# Patient Record
Sex: FEMALE | Race: WHITE | NOT HISPANIC OR LATINO
[De-identification: names, ages, dates, MRNs, and addresses within clinical notes are randomized per-mention and may not be internally consistent; named-entity substitution may affect disease eponyms.]

---

## 2017-01-11 ENCOUNTER — APPOINTMENT (OUTPATIENT)
Dept: ORTHOPEDIC SURGERY | Facility: CLINIC | Age: 77
End: 2017-01-11

## 2017-02-08 ENCOUNTER — APPOINTMENT (OUTPATIENT)
Dept: ORTHOPEDIC SURGERY | Facility: CLINIC | Age: 77
End: 2017-02-08

## 2017-02-08 DIAGNOSIS — Z96.641 PRESENCE OF RIGHT ARTIFICIAL HIP JOINT: ICD-10-CM

## 2018-04-25 ENCOUNTER — APPOINTMENT (OUTPATIENT)
Dept: GERIATRICS | Facility: CLINIC | Age: 78
End: 2018-04-25
Payer: MEDICARE

## 2018-04-25 VITALS
WEIGHT: 147 LBS | SYSTOLIC BLOOD PRESSURE: 130 MMHG | OXYGEN SATURATION: 97 % | DIASTOLIC BLOOD PRESSURE: 60 MMHG | RESPIRATION RATE: 18 BRPM | HEIGHT: 62.4 IN | HEART RATE: 72 BPM | BODY MASS INDEX: 26.71 KG/M2

## 2018-04-25 DIAGNOSIS — T40.2X5A DRUG INDUCED CONSTIPATION: ICD-10-CM

## 2018-04-25 DIAGNOSIS — K59.03 DRUG INDUCED CONSTIPATION: ICD-10-CM

## 2018-04-25 DIAGNOSIS — R60.0 LOCALIZED EDEMA: ICD-10-CM

## 2018-04-25 DIAGNOSIS — G89.3 NEOPLASM RELATED PAIN (ACUTE) (CHRONIC): ICD-10-CM

## 2018-04-25 PROCEDURE — 99205 OFFICE O/P NEW HI 60 MIN: CPT

## 2018-04-25 RX ORDER — GABAPENTIN 300 MG/1
300 CAPSULE ORAL
Refills: 0 | Status: ACTIVE | COMMUNITY
Start: 2018-04-25

## 2018-04-25 RX ORDER — OXYCODONE 10 MG/1
10 TABLET ORAL 4 TIMES DAILY
Refills: 0 | Status: ACTIVE | COMMUNITY
Start: 2018-04-25

## 2018-04-25 RX ORDER — ESCITALOPRAM OXALATE 5 MG/1
5 TABLET ORAL
Refills: 0 | Status: ACTIVE | COMMUNITY
Start: 2018-04-25

## 2018-04-25 RX ORDER — DICLOFENAC SODIUM 10 MG/G
1 GEL TOPICAL
Qty: 1 | Refills: 2 | Status: ACTIVE | COMMUNITY
Start: 2018-04-25 | End: 1900-01-01

## 2018-04-25 RX ORDER — FENTANYL 12 UG/H
12 PATCH, EXTENDED RELEASE TRANSDERMAL
Refills: 0 | Status: ACTIVE | COMMUNITY
Start: 2018-04-25

## 2018-04-25 RX ORDER — ACYCLOVIR 400 MG/1
400 TABLET ORAL DAILY
Refills: 0 | Status: ACTIVE | COMMUNITY
Start: 2018-04-25

## 2018-04-25 RX ORDER — DEXAMETHASONE 2 MG/1
2 TABLET ORAL DAILY
Refills: 0 | Status: ACTIVE | COMMUNITY
Start: 2018-04-25

## 2018-04-25 RX ORDER — FENTANYL 25 UG/H
25 PATCH, EXTENDED RELEASE TRANSDERMAL
Refills: 0 | Status: ACTIVE | COMMUNITY
Start: 2018-04-25

## 2018-04-26 PROBLEM — R60.0 BILATERAL LEG EDEMA: Status: ACTIVE | Noted: 2018-04-26

## 2018-04-26 PROBLEM — K59.03 CONSTIPATION DUE TO OPIOID THERAPY: Status: ACTIVE | Noted: 2018-04-26

## 2018-08-06 ENCOUNTER — OUTPATIENT (OUTPATIENT)
Dept: OUTPATIENT SERVICES | Facility: HOSPITAL | Age: 78
LOS: 1 days | Discharge: HOME | End: 2018-08-06
Payer: MEDICARE

## 2018-08-09 ENCOUNTER — APPOINTMENT (OUTPATIENT)
Dept: RADIATION ONCOLOGY | Facility: CLINIC | Age: 78
End: 2018-08-09
Payer: MEDICARE

## 2018-08-09 VITALS
BODY MASS INDEX: 28.27 KG/M2 | SYSTOLIC BLOOD PRESSURE: 150 MMHG | WEIGHT: 142.09 LBS | TEMPERATURE: 98.06 F | RESPIRATION RATE: 15 BRPM | DIASTOLIC BLOOD PRESSURE: 81 MMHG | OXYGEN SATURATION: 92 % | HEART RATE: 66 BPM | HEIGHT: 59.45 IN

## 2018-08-09 DIAGNOSIS — Z92.3 PERSONAL HISTORY OF IRRADIATION: ICD-10-CM

## 2018-08-09 PROCEDURE — 77262 THER RADIOLOGY TX PLNG INTRM: CPT

## 2018-08-09 PROCEDURE — 99214 OFFICE O/P EST MOD 30 MIN: CPT | Mod: 25

## 2018-08-14 PROCEDURE — 77290 THER RAD SIMULAJ FIELD CPLX: CPT | Mod: 26

## 2018-08-15 ENCOUNTER — APPOINTMENT (OUTPATIENT)
Dept: UROGYNECOLOGY | Facility: CLINIC | Age: 78
End: 2018-08-15
Payer: MEDICARE

## 2018-08-15 VITALS
HEART RATE: 66 BPM | SYSTOLIC BLOOD PRESSURE: 144 MMHG | WEIGHT: 142 LBS | OXYGEN SATURATION: 92 % | DIASTOLIC BLOOD PRESSURE: 80 MMHG | BODY MASS INDEX: 28.25 KG/M2 | HEIGHT: 59.45 IN

## 2018-08-15 DIAGNOSIS — R39.89 OTHER SYMPTOMS AND SIGNS INVOLVING THE GENITOURINARY SYSTEM: ICD-10-CM

## 2018-08-15 DIAGNOSIS — R30.0 DYSURIA: ICD-10-CM

## 2018-08-15 DIAGNOSIS — R39.15 URGENCY OF URINATION: ICD-10-CM

## 2018-08-15 DIAGNOSIS — R35.0 FREQUENCY OF MICTURITION: ICD-10-CM

## 2018-08-15 LAB
BILIRUB UR QL STRIP: NORMAL
CLARITY UR: CLEAR
GLUCOSE UR-MCNC: NORMAL
HCG UR QL: 0.2 EU/DL
HGB UR QL STRIP.AUTO: NORMAL
KETONES UR-MCNC: NORMAL
LEUKOCYTE ESTERASE UR QL STRIP: NORMAL
NITRITE UR QL STRIP: NORMAL
PH UR STRIP: 5
PROT UR STRIP-MCNC: NORMAL
SP GR UR STRIP: 1.01

## 2018-08-15 PROCEDURE — 51701 INSERT BLADDER CATHETER: CPT

## 2018-08-15 PROCEDURE — 99204 OFFICE O/P NEW MOD 45 MIN: CPT | Mod: 25

## 2018-08-16 ENCOUNTER — MESSAGE (OUTPATIENT)
Age: 78
End: 2018-08-16

## 2018-08-16 ENCOUNTER — RESULT REVIEW (OUTPATIENT)
Age: 78
End: 2018-08-16

## 2018-08-16 PROCEDURE — 88321 CONSLTJ&REPRT SLD PREP ELSWR: CPT

## 2018-08-16 RX ORDER — METHENAMINE, SODIUM PHOSPHATE, MONOBASIC, MONOHYDRATE, PHENYL SALICYLATE, METHYLENE BLUE, AND HYOSCYAMINE SULFATE 118; 40.8; 36; 10; .12 MG/1; MG/1; MG/1; MG/1; MG/1
118 CAPSULE ORAL 3 TIMES DAILY
Qty: 90 | Refills: 3 | Status: DISCONTINUED | COMMUNITY
Start: 2018-08-15 | End: 2018-08-16

## 2018-08-17 ENCOUNTER — APPOINTMENT (OUTPATIENT)
Dept: RADIATION ONCOLOGY | Facility: CLINIC | Age: 78
End: 2018-08-17

## 2018-08-20 ENCOUNTER — RESULT REVIEW (OUTPATIENT)
Age: 78
End: 2018-08-20

## 2018-08-20 PROCEDURE — 88321 CONSLTJ&REPRT SLD PREP ELSWR: CPT

## 2018-08-20 PROCEDURE — 88341 IMHCHEM/IMCYTCHM EA ADD ANTB: CPT | Mod: 26,59

## 2018-08-20 PROCEDURE — 88342 IMHCHEM/IMCYTCHM 1ST ANTB: CPT | Mod: 26,59

## 2018-08-20 PROCEDURE — 88360 TUMOR IMMUNOHISTOCHEM/MANUAL: CPT | Mod: 26,59

## 2018-08-21 PROCEDURE — 77300 RADIATION THERAPY DOSE PLAN: CPT | Mod: 26

## 2018-08-21 PROCEDURE — 77334 RADIATION TREATMENT AID(S): CPT | Mod: 26

## 2018-08-21 PROCEDURE — 77295 3-D RADIOTHERAPY PLAN: CPT | Mod: 26

## 2018-08-23 ENCOUNTER — APPOINTMENT (OUTPATIENT)
Dept: RADIATION ONCOLOGY | Facility: CLINIC | Age: 78
End: 2018-08-23

## 2018-08-27 PROCEDURE — 77280 THER RAD SIMULAJ FIELD SMPL: CPT | Mod: 26

## 2018-09-04 PROCEDURE — 77427 RADIATION TX MANAGEMENT X5: CPT

## 2018-09-11 PROCEDURE — 77427 RADIATION TX MANAGEMENT X5: CPT

## 2018-09-18 PROCEDURE — 77334 RADIATION TREATMENT AID(S): CPT | Mod: 26

## 2018-09-18 PROCEDURE — 77300 RADIATION THERAPY DOSE PLAN: CPT | Mod: 26

## 2018-09-18 PROCEDURE — 77295 3-D RADIOTHERAPY PLAN: CPT | Mod: 26

## 2018-09-18 PROCEDURE — 77263 THER RADIOLOGY TX PLNG CPLX: CPT

## 2018-09-19 PROCEDURE — 77280 THER RAD SIMULAJ FIELD SMPL: CPT | Mod: 26

## 2018-09-26 PROCEDURE — 77427 RADIATION TX MANAGEMENT X5: CPT

## 2018-09-26 NOTE — VITALS
[Maximal Pain Intensity: 4/10] : 4/10 [Least Pain Intensity: 1/10] : 1/10 [Pain Description/Quality: ___] : Pain description/quality: [unfilled] [Pain Duration: ___] : Pain duration: [unfilled] [Pain Location: ___] : Pain Location: [unfilled] [Pain Interferes with ADLs] : Pain interferes with activities of daily living. [Opioid] : opioid [80: Normal activity with effort; some signs or symptoms of disease.] : 80: Normal activity with effort; some signs or symptoms of disease.  [ECOG Performance Status: 2 - Ambulatory and capable of all self care but unable to carry out any work activities] : Performance Status: 2 - Ambulatory and capable of all self care but unable to carry out any work activities. Up and about more than 50% of waking hours

## 2018-10-03 PROCEDURE — 77427 RADIATION TX MANAGEMENT X5: CPT

## 2018-10-15 ENCOUNTER — APPOINTMENT (OUTPATIENT)
Dept: UROGYNECOLOGY | Facility: CLINIC | Age: 78
End: 2018-10-15

## 2018-11-01 NOTE — HISTORY OF PRESENT ILLNESS
[FreeTextEntry1] : 78 y/o female with h/o multiple myeloma diagnosed in 2010, right hip replacement, Radiation to spine in 4/2011 presents with left hip pain for 6 months. Pt diagnosed in 10/2010 with multiple myeloma, at which time she was treated Revlimid, Velcade. Pt states that she was in remission for 4 years and recurred in 2016, which was found after she presented with a hip fracture. Her disease has progressed through treatments, is now on Velcade. 6 months ago she fractured her L hip and underwent ORIF. \par \par Pt c/o pain managed by Dr Sanderson and Dr Alexandre, Pain is localized to many areas - b/l hips, L>R. Worse with standing, walking. The pain is severe, she rates it as 10/10 at its worst. Currently she rates it at 7-8/10. She uses a rolling walker with ambulation. \par \par Ortho surgeon: Dr Velez and Dr Ernandez. Hem/Onc Dr Meade. \par \par PET scan in 7/2018: left iliac mass, 59 mm suspected malignant mass in the subcutaneous fat adjacent to left iliac bone, left gluteal musculature mass. \par \par Left hip mass biopsy was done in 7/2018.\par \par Patient education given about side effects of Radiation to bone, soft tissue tumor. Side effects include but not limited to skin reaction, bone pain, bone marrow suppression. \par \par 8/8 fractions of RT to left hip completed. Left hip pain stable. No significant skin reaction from RT. On oxycodone 10mg and Fentanyl patch 75mcg for left hip pain.

## 2018-11-15 ENCOUNTER — APPOINTMENT (OUTPATIENT)
Dept: RADIATION ONCOLOGY | Facility: CLINIC | Age: 78
End: 2018-11-15

## 2018-11-18 NOTE — HISTORY OF PRESENT ILLNESS
[FreeTextEntry1] : 78 y/o female with h/o multiple myeloma diagnosed in 2010, right hip replacement, Radiation to spine in 4/2011 presents with left hip pain for 6 months. Pt diagnosed in 10/2010 with multiple myeloma, at which time she was treated Revlimid, Velcade. Pt states that she was in remission for 4 years and recurred in 2016, which was found after she presented with a hip fracture. Her disease has progressed through treatments, is now on Velcade. 6 months ago she fractured her L hip and underwent ORIF. \par \par Pt c/o pain managed by Dr Sanderson and Dr Alexandre, Pain is localized to many areas - b/l hips, L>R. Worse with standing, walking. The pain is severe, she rates it as 10/10 at its worst. Currently she rates it at 7-8/10. She uses a rolling walker with ambulation. \par \par Ortho surgeon: Dr Velez and Dr Ernandez. Hem/Onc Dr Meade. \par \par PET scan in 7/2018: left iliac mass, 59 mm suspected malignant mass in the subcutaneous fat adjacent to left iliac bone, left gluteal musculature mass. \par \par Left hip mass biopsy was done in 7/2018.\par \par Patient education given about side effects of Radiation to bone, soft tissue tumor. Side effects include but not limited to skin reaction, bone pain, bone marrow suppression. \par \par 5/8 fractions of RT to left hip completed. Left hip pain stable. No significant skin reaction from RT. On oxycodone 10mg and Fentanyl patch 75mcg for left hip pain.

## 2018-11-29 ENCOUNTER — APPOINTMENT (OUTPATIENT)
Dept: PULMONOLOGY | Facility: CLINIC | Age: 78
End: 2018-11-29

## 2018-12-14 ENCOUNTER — APPOINTMENT (OUTPATIENT)
Dept: RADIATION ONCOLOGY | Facility: CLINIC | Age: 78
End: 2018-12-14
Payer: MEDICARE

## 2018-12-14 VITALS — HEIGHT: 59 IN | BODY MASS INDEX: 28.22 KG/M2 | RESPIRATION RATE: 16 BRPM | WEIGHT: 140 LBS

## 2018-12-14 PROCEDURE — 99024 POSTOP FOLLOW-UP VISIT: CPT

## 2018-12-14 NOTE — VITALS
[Maximal Pain Intensity: 6/10] : 6/10 [Least Pain Intensity: 2/10] : 2/10 [Pain Description/Quality: ___] : Pain description/quality: [unfilled] [Pain Duration: ___] : Pain duration: [unfilled] [Pain Location: ___] : Pain Location: [unfilled] [Pain Interferes with ADLs] : Pain interferes with activities of daily living. [Opioid] : opioid [80: Normal activity with effort; some signs or symptoms of disease.] : 80: Normal activity with effort; some signs or symptoms of disease.  [ECOG Performance Status: 2 - Ambulatory and capable of all self care but unable to carry out any work activities] : Performance Status: 2 - Ambulatory and capable of all self care but unable to carry out any work activities. Up and about more than 50% of waking hours

## 2018-12-17 NOTE — HISTORY OF PRESENT ILLNESS
[FreeTextEntry1] : 76 y/o female with h/o multiple myeloma diagnosed in 2010, right hip replacement, Radiation to spine in 4/2011 presents with left hip pain for 6 months. Pt diagnosed in 10/2010 with multiple myeloma, at which time she was treated Revlimid, Velcade. Pt states that she was in remission for 4 years and recurred in 2016, which was found after she presented with a hip fracture. Her disease has progressed through treatments, is now on Velcade. In 2/2018 she fractured her L hip and underwent ORIF @ [facility]\par \par She completed radiation treatment to left femur and hip 10/2018. c/o pain over left hip and sensitive skin. MRI of left leg done 1 week ago with Dr Meade [phone]

## 2018-12-17 NOTE — PHYSICAL EXAM
[Normal] : oriented to person, place and time, the affect was normal, the mood was normal and not anxious [de-identified] : left hip tenderness

## 2018-12-17 NOTE — REVIEW OF SYSTEMS
[Negative] : Allergic/Immunologic [FreeTextEntry9] : left leg pain, h/o radiation to left leg 10/2018

## 2019-07-23 ENCOUNTER — APPOINTMENT (OUTPATIENT)
Dept: INTERVENTIONAL RADIOLOGY/VASCULAR | Facility: CLINIC | Age: 79
End: 2019-07-23
Payer: MEDICARE

## 2019-07-23 VITALS
SYSTOLIC BLOOD PRESSURE: 131 MMHG | BODY MASS INDEX: 26.68 KG/M2 | HEIGHT: 62 IN | OXYGEN SATURATION: 97 % | HEART RATE: 65 BPM | WEIGHT: 145 LBS | DIASTOLIC BLOOD PRESSURE: 79 MMHG | RESPIRATION RATE: 16 BRPM

## 2019-07-23 DIAGNOSIS — C90.00 MULTIPLE MYELOMA NOT HAVING ACHIEVED REMISSION: ICD-10-CM

## 2019-07-23 DIAGNOSIS — M25.869 OTHER SPECIFIED JOINT DISORDERS, UNSPECIFIED KNEE: ICD-10-CM

## 2019-07-23 PROCEDURE — 99204 OFFICE O/P NEW MOD 45 MIN: CPT

## 2019-07-23 RX ORDER — METHENAMINE, SODIUM PHOSPHATE, MONOBASIC, MONOHYDRATE, PHENYL SALICYLATE, METHYLENE BLUE, AND HYOSCYAMINE SULFATE 118; 40.8; 36; 10; .12 MG/1; MG/1; MG/1; MG/1; MG/1
118 CAPSULE ORAL 3 TIMES DAILY
Qty: 90 | Refills: 3 | Status: DISCONTINUED | COMMUNITY
Start: 2018-08-17 | End: 2019-07-23

## 2019-07-29 NOTE — PHYSICAL EXAM
[Normal Hearing] : hearing was normal [No Respiratory Distress] : no respiratory distress [Alert] : alert [Not Tender] : non-tender [Normal Rate] : heart rate was normal  [No Tremors] : no tremors [Oriented x3] : oriented to person, place, and time [de-identified] : slow gait walks with cane. left lateral knee mass palpated firm and fixed. tender to palpate. Mild erythema noted as well around the mass.

## 2019-07-29 NOTE — REVIEW OF SYSTEMS
[Fever] : no fever [Chills] : no chills [Loss Of Hearing] : no hearing loss [Palpitations] : no palpitations [Chest Pain] : no chest pain [Shortness Of Breath] : no shortness of breath [Diarrhea] : no diarrhea [Easy Bruising] : no tendency for easy bruising [Easy Bleeding] : no tendency for easy bleeding

## 2019-07-29 NOTE — ASSESSMENT
[FreeTextEntry1] : 78 year old woman with multiple myeloma on treatment with palpable mass in the lateral aspect of the left knee, which is new since the PET/CT from Jan 2019.  There are no cross section imaging since the PET/CT.  Therefore prescription for CT left knee with IV contrast given to patient.  Patient states that there is a hardware in the left femur and that attempt at MRI in the past made her feel extreme heat in the left leg.  Will review the CT left knee and determine if there is anything to biopsy.

## 2019-07-29 NOTE — HISTORY OF PRESENT ILLNESS
[FreeTextEntry1] : 78 years old female with h/o multiple myeloma diagnosed in 2010 right hip replacement, Radiation to spine in 4/2011. She was treated Revlimid, Velcade in 2010 for myeloma. Pt states that she was in remission for 4 years and recurred in 2016, which was found after she presented with a right hip fracture. Her disease has progressed through treatments, is now on Velcade. In 2/2018 she fractured her L hip and underwent ORIF @ [facility]. She completed radiation treatment to left femur and hip 10/2018. She has been complaining of pain over left hip and sensitive skin. \par \par She has been followed by Dr. Meade oncologist. She had PET/Ct done in January 2019 which demonstrated, "soft tissue mass in the subcutaneous fat adjacent to left iliac bone has nearly completely resolved. Residual low density linear structures in this region more likely reflects soft tissue edema or scarring than residual tumor". \par \par She noticed increase in size of the left lateral knee mass and is painful upon palpation. \par \par She is referred by Dr. Meade for left lateral knee mass biopsy. \par Patient states she has a lot of pain in the left knee and leg. Pain is 7/10 and is sharp in nature. She takes Tylenol and oxycodone with some relief. \par Appetite has been good no unintentional weight loss.\par \par Denies any recent SOB, CP, fever, chills, n/v/d. \par

## 2019-08-29 ENCOUNTER — APPOINTMENT (OUTPATIENT)
Dept: THORACIC SURGERY | Facility: CLINIC | Age: 79
End: 2019-08-29

## 2019-12-10 ENCOUNTER — INPATIENT (INPATIENT)
Facility: HOSPITAL | Age: 79
LOS: 5 days | Discharge: HOME | End: 2019-12-16
Attending: HOSPITALIST | Admitting: HOSPITALIST
Payer: MEDICARE

## 2019-12-10 VITALS
TEMPERATURE: 98 F | OXYGEN SATURATION: 99 % | DIASTOLIC BLOOD PRESSURE: 64 MMHG | HEART RATE: 72 BPM | RESPIRATION RATE: 20 BRPM | SYSTOLIC BLOOD PRESSURE: 118 MMHG | HEIGHT: 59 IN | WEIGHT: 147.49 LBS

## 2019-12-10 DIAGNOSIS — I82.401 ACUTE EMBOLISM AND THROMBOSIS OF UNSPECIFIED DEEP VEINS OF RIGHT LOWER EXTREMITY: ICD-10-CM

## 2019-12-10 DIAGNOSIS — Z02.9 ENCOUNTER FOR ADMINISTRATIVE EXAMINATIONS, UNSPECIFIED: ICD-10-CM

## 2019-12-10 DIAGNOSIS — C90.02 MULTIPLE MYELOMA IN RELAPSE: ICD-10-CM

## 2019-12-10 DIAGNOSIS — G89.3 NEOPLASM RELATED PAIN (ACUTE) (CHRONIC): ICD-10-CM

## 2019-12-10 DIAGNOSIS — K59.03 DRUG INDUCED CONSTIPATION: ICD-10-CM

## 2019-12-10 DIAGNOSIS — R11.0 NAUSEA: ICD-10-CM

## 2019-12-10 DIAGNOSIS — C90.00 MULTIPLE MYELOMA NOT HAVING ACHIEVED REMISSION: ICD-10-CM

## 2019-12-10 LAB
ALBUMIN SERPL ELPH-MCNC: 3.7 G/DL — SIGNIFICANT CHANGE UP (ref 3.3–5)
ALP SERPL-CCNC: 68 U/L — SIGNIFICANT CHANGE UP (ref 40–120)
ALT FLD-CCNC: 15 U/L — SIGNIFICANT CHANGE UP (ref 4–33)
ANION GAP SERPL CALC-SCNC: 11 MMO/L — SIGNIFICANT CHANGE UP (ref 7–14)
ANISOCYTOSIS BLD QL: SLIGHT — SIGNIFICANT CHANGE UP
APPEARANCE UR: CLEAR — SIGNIFICANT CHANGE UP
APTT BLD: 32.5 SEC — SIGNIFICANT CHANGE UP (ref 27.5–36.3)
AST SERPL-CCNC: 17 U/L — SIGNIFICANT CHANGE UP (ref 4–32)
BASOPHILS # BLD AUTO: 0.05 K/UL — SIGNIFICANT CHANGE UP (ref 0–0.2)
BASOPHILS NFR BLD AUTO: 2 % — SIGNIFICANT CHANGE UP (ref 0–2)
BASOPHILS NFR SPEC: 1.8 % — SIGNIFICANT CHANGE UP (ref 0–2)
BILIRUB SERPL-MCNC: 0.2 MG/DL — SIGNIFICANT CHANGE UP (ref 0.2–1.2)
BILIRUB UR-MCNC: NEGATIVE — SIGNIFICANT CHANGE UP
BLASTS # FLD: 0 % — SIGNIFICANT CHANGE UP (ref 0–0)
BLOOD UR QL VISUAL: NEGATIVE — SIGNIFICANT CHANGE UP
BUN SERPL-MCNC: 11 MG/DL — SIGNIFICANT CHANGE UP (ref 7–23)
CALCIUM SERPL-MCNC: 9.6 MG/DL — SIGNIFICANT CHANGE UP (ref 8.4–10.5)
CHLORIDE SERPL-SCNC: 98 MMOL/L — SIGNIFICANT CHANGE UP (ref 98–107)
CO2 SERPL-SCNC: 29 MMOL/L — SIGNIFICANT CHANGE UP (ref 22–31)
COLOR SPEC: SIGNIFICANT CHANGE UP
CREAT SERPL-MCNC: 0.63 MG/DL — SIGNIFICANT CHANGE UP (ref 0.5–1.3)
EOSINOPHIL # BLD AUTO: 0.17 K/UL — SIGNIFICANT CHANGE UP (ref 0–0.5)
EOSINOPHIL NFR BLD AUTO: 6.7 % — HIGH (ref 0–6)
EOSINOPHIL NFR FLD: 8 % — HIGH (ref 0–6)
GIANT PLATELETS BLD QL SMEAR: PRESENT — SIGNIFICANT CHANGE UP
GLUCOSE SERPL-MCNC: 104 MG/DL — HIGH (ref 70–99)
GLUCOSE UR-MCNC: NEGATIVE — SIGNIFICANT CHANGE UP
HCT VFR BLD CALC: 32.4 % — LOW (ref 34.5–45)
HGB BLD-MCNC: 10 G/DL — LOW (ref 11.5–15.5)
HYPOCHROMIA BLD QL: SLIGHT — SIGNIFICANT CHANGE UP
IMM GRANULOCYTES NFR BLD AUTO: 0.4 % — SIGNIFICANT CHANGE UP (ref 0–1.5)
INR BLD: 1.16 — SIGNIFICANT CHANGE UP (ref 0.88–1.17)
KETONES UR-MCNC: NEGATIVE — SIGNIFICANT CHANGE UP
LEUKOCYTE ESTERASE UR-ACNC: NEGATIVE — SIGNIFICANT CHANGE UP
LYMPHOCYTES # BLD AUTO: 0.77 K/UL — LOW (ref 1–3.3)
LYMPHOCYTES # BLD AUTO: 30.4 % — SIGNIFICANT CHANGE UP (ref 13–44)
LYMPHOCYTES NFR SPEC AUTO: 15.2 % — SIGNIFICANT CHANGE UP (ref 13–44)
MACROCYTES BLD QL: SLIGHT — SIGNIFICANT CHANGE UP
MAGNESIUM SERPL-MCNC: 2 MG/DL — SIGNIFICANT CHANGE UP (ref 1.6–2.6)
MCHC RBC-ENTMCNC: 28.7 PG — SIGNIFICANT CHANGE UP (ref 27–34)
MCHC RBC-ENTMCNC: 30.9 % — LOW (ref 32–36)
MCV RBC AUTO: 93.1 FL — SIGNIFICANT CHANGE UP (ref 80–100)
METAMYELOCYTES # FLD: 0 % — SIGNIFICANT CHANGE UP (ref 0–1)
MONOCYTES # BLD AUTO: 0.53 K/UL — SIGNIFICANT CHANGE UP (ref 0–0.9)
MONOCYTES NFR BLD AUTO: 20.9 % — HIGH (ref 2–14)
MONOCYTES NFR BLD: 11.6 % — HIGH (ref 2–9)
MYELOCYTES NFR BLD: 0 % — SIGNIFICANT CHANGE UP (ref 0–0)
NEUTROPHIL AB SER-ACNC: 48.2 % — SIGNIFICANT CHANGE UP (ref 43–77)
NEUTROPHILS # BLD AUTO: 1 K/UL — LOW (ref 1.8–7.4)
NEUTROPHILS NFR BLD AUTO: 39.6 % — LOW (ref 43–77)
NEUTS BAND # BLD: 0 % — SIGNIFICANT CHANGE UP (ref 0–6)
NITRITE UR-MCNC: NEGATIVE — SIGNIFICANT CHANGE UP
NRBC # FLD: 0 K/UL — SIGNIFICANT CHANGE UP (ref 0–0)
OTHER - HEMATOLOGY %: 9.8 — SIGNIFICANT CHANGE UP
OVALOCYTES BLD QL SMEAR: SLIGHT — SIGNIFICANT CHANGE UP
PH UR: 7.5 — SIGNIFICANT CHANGE UP (ref 5–8)
PHOSPHATE SERPL-MCNC: 3.7 MG/DL — SIGNIFICANT CHANGE UP (ref 2.5–4.5)
PLATELET # BLD AUTO: 141 K/UL — LOW (ref 150–400)
PLATELET COUNT - ESTIMATE: SIGNIFICANT CHANGE UP
PMV BLD: 8.9 FL — SIGNIFICANT CHANGE UP (ref 7–13)
POLYCHROMASIA BLD QL SMEAR: SLIGHT — SIGNIFICANT CHANGE UP
POTASSIUM SERPL-MCNC: 4.1 MMOL/L — SIGNIFICANT CHANGE UP (ref 3.5–5.3)
POTASSIUM SERPL-SCNC: 4.1 MMOL/L — SIGNIFICANT CHANGE UP (ref 3.5–5.3)
PROMYELOCYTES # FLD: 0 % — SIGNIFICANT CHANGE UP (ref 0–0)
PROT SERPL-MCNC: 6.4 G/DL — SIGNIFICANT CHANGE UP (ref 6–8.3)
PROT UR-MCNC: NEGATIVE — SIGNIFICANT CHANGE UP
PROTHROM AB SERPL-ACNC: 13.3 SEC — HIGH (ref 9.8–13.1)
RBC # BLD: 3.48 M/UL — LOW (ref 3.8–5.2)
RBC # FLD: 15.5 % — HIGH (ref 10.3–14.5)
SODIUM SERPL-SCNC: 138 MMOL/L — SIGNIFICANT CHANGE UP (ref 135–145)
SP GR SPEC: 1.01 — SIGNIFICANT CHANGE UP (ref 1–1.04)
UROBILINOGEN FLD QL: NORMAL — SIGNIFICANT CHANGE UP
VARIANT LYMPHS # BLD: 5.4 % — SIGNIFICANT CHANGE UP
WBC # BLD: 2.53 K/UL — LOW (ref 3.8–10.5)
WBC # FLD AUTO: 2.53 K/UL — LOW (ref 3.8–10.5)

## 2019-12-10 PROCEDURE — 93970 EXTREMITY STUDY: CPT | Mod: 26

## 2019-12-10 PROCEDURE — 71045 X-RAY EXAM CHEST 1 VIEW: CPT | Mod: 26

## 2019-12-10 PROCEDURE — 99223 1ST HOSP IP/OBS HIGH 75: CPT

## 2019-12-10 PROCEDURE — 93010 ELECTROCARDIOGRAM REPORT: CPT

## 2019-12-10 RX ORDER — FENTANYL CITRATE 50 UG/ML
1 INJECTION INTRAVENOUS
Refills: 0 | Status: DISCONTINUED | OUTPATIENT
Start: 2019-12-10 | End: 2019-12-12

## 2019-12-10 RX ORDER — APIXABAN 2.5 MG/1
5 TABLET, FILM COATED ORAL
Refills: 0 | Status: DISCONTINUED | OUTPATIENT
Start: 2019-12-10 | End: 2019-12-16

## 2019-12-10 RX ORDER — OXYCODONE HYDROCHLORIDE 5 MG/1
10 TABLET ORAL EVERY 4 HOURS
Refills: 0 | Status: DISCONTINUED | OUTPATIENT
Start: 2019-12-10 | End: 2019-12-10

## 2019-12-10 RX ORDER — FLUCONAZOLE 150 MG/1
200 TABLET ORAL DAILY
Refills: 0 | Status: DISCONTINUED | OUTPATIENT
Start: 2019-12-11 | End: 2019-12-13

## 2019-12-10 RX ORDER — OXYCODONE HYDROCHLORIDE 5 MG/1
20 TABLET ORAL EVERY 4 HOURS
Refills: 0 | Status: DISCONTINUED | OUTPATIENT
Start: 2019-12-10 | End: 2019-12-12

## 2019-12-10 RX ORDER — ESCITALOPRAM OXALATE 10 MG/1
20 TABLET, FILM COATED ORAL DAILY
Refills: 0 | Status: DISCONTINUED | OUTPATIENT
Start: 2019-12-10 | End: 2019-12-16

## 2019-12-10 RX ORDER — CHLORHEXIDINE GLUCONATE 213 G/1000ML
15 SOLUTION TOPICAL
Refills: 0 | Status: DISCONTINUED | OUTPATIENT
Start: 2019-12-10 | End: 2019-12-16

## 2019-12-10 RX ORDER — SODIUM CHLORIDE 9 MG/ML
1000 INJECTION INTRAMUSCULAR; INTRAVENOUS; SUBCUTANEOUS
Refills: 0 | Status: DISCONTINUED | OUTPATIENT
Start: 2019-12-10 | End: 2019-12-16

## 2019-12-10 RX ORDER — ACYCLOVIR SODIUM 500 MG
400 VIAL (EA) INTRAVENOUS
Refills: 0 | Status: DISCONTINUED | OUTPATIENT
Start: 2019-12-10 | End: 2019-12-13

## 2019-12-10 RX ADMIN — OXYCODONE HYDROCHLORIDE 20 MILLIGRAM(S): 5 TABLET ORAL at 21:58

## 2019-12-10 RX ADMIN — SODIUM CHLORIDE 75 MILLILITER(S): 9 INJECTION INTRAMUSCULAR; INTRAVENOUS; SUBCUTANEOUS at 19:16

## 2019-12-10 RX ADMIN — APIXABAN 5 MILLIGRAM(S): 2.5 TABLET, FILM COATED ORAL at 19:15

## 2019-12-10 RX ADMIN — OXYCODONE HYDROCHLORIDE 20 MILLIGRAM(S): 5 TABLET ORAL at 21:27

## 2019-12-10 RX ADMIN — CHLORHEXIDINE GLUCONATE 15 MILLILITER(S): 213 SOLUTION TOPICAL at 19:16

## 2019-12-10 NOTE — CONSULT NOTE ADULT - SUBJECTIVE AND OBJECTIVE BOX
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 in 2016 patient had progression of disease along with T12/L1 compression fracture and hip fracture s/p ORIF and RT. Patient has since underwent further therapy with RVd, and daratumumab based therapy which was stopped due to intoleraance. In 2017 pt had pathological fracture of L femur and underwent ORIF and RT and started on IRd, CyborD and rechallenged with daratumumab which patient could not tolerate. Patient started on carfizolmib and pomolidomide most recently started on elutuzumab/pomolidomide/dex with progression of disease and now admitted for DCEP (dexamethasone along with CIVI of cisplatin, etoposide and cyclophosphomide). After a prolonged discussion, pt opted to start the first cycle in the hospital and direct admission was arranged. Currently pt denies any further fever, SOB or CP. Patient denies any dysuria, or constipation but notes loose stools.     PAST MEDICAL & SURGICAL HISTORY:  As above  depression  LLE DVT (diagnosed at [facility] in 11/2019)    NKDA  Fm Hx NC; denies tobacco use; deneis EtOh use; lives at home    Home Medications:  acyclovir 400 mg oral tablet: 1 tab(s) orally 2 times a day (10 Dec 2019 12:52)  Eliquis 5 mg oral tablet: 1 tab(s) orally 2 times a day (10 Dec 2019 12:52)  fentaNYL 100 mcg/hr transdermal film, extended release: 1 film(s) transdermal every 72 hours (10 Dec 2019 12:52)  fentaNYL 25 mcg/hr transdermal film, extended release: 1 film(s) transdermal every 72 hours (10 Dec 2019 12:52)  Lexapro 20 mg oral tablet: 1 tab(s) orally once a day (10 Dec 2019 12:52)  oxyCODONE 20 mg oral tablet: 1 tab(s) orally every 6 hours (10 Dec 2019 12:52)      MEDICATIONS  (STANDING):  acyclovir   Oral Tab/Cap 400 milliGRAM(s) Oral two times a day  apixaban 5 milliGRAM(s) Oral two times a day  chlorhexidine 0.12% Liquid 15 milliLiter(s) Oral Mucosa two times a day  escitalopram 20 milliGRAM(s) Oral daily  fentaNYL   Patch  25 MICROgram(s)/Hr 1 Patch Transdermal every 72 hours  fentaNYL   Patch 100 MICROgram(s)/Hr. 1 Patch Transdermal every 48 hours    MEDICATIONS  (PRN):  bisacodyl 5 milliGRAM(s) Oral every 12 hours PRN Constipation  oxyCODONE    IR 20 milliGRAM(s) Oral every 4 hours PRN Severe Pain (7 - 10)    REVIEW OF SYSTEMS  General: Noncontributory; well appearing; Skin/Breast: multiple subcutaneous plasmacytoma; Ophthalmologic: Non-Contributory; ENMT: NC; Respiratory and Thorax: NC; Cardiovascular: NC;  Gastrointestinal: NC; Genitourinary: NC;Musculoskeletal:	myeloma bone disease; Neurological: NC; Psychiatric: depression; Hematology/Lymphatics: MM, LLE DVT; Endocrine: NC; Allergic/Immunologic: NC    Vital Signs Last 24 Hrs  T(C): 36.9 (10 Dec 2019 11:12), Max: 36.9 (10 Dec 2019 11:12)  T(F): 98.5 (10 Dec 2019 11:12), Max: 98.5 (10 Dec 2019 11:12)  HR: 72 (10 Dec 2019 11:12) (72 - 72)  BP: 118/64 (10 Dec 2019 11:12) (118/64 - 118/64)  RR: 20 (10 Dec 2019 11:12) (20 - 20)  SpO2: 99% (10 Dec 2019 11:12) (99% - 99%)    NAC/ NC/AT; PERRLA; EOMI  supple; MMM  A and O x 3  CTA b/l; no W/R/C; [facility] site C/D/I  Nl S1 S2 RR; no M  Ab soft; on HSM; no guarding  no CC; + edema; + subcutaneous mass c/w plasmacytoma  neuro exam non-focal  warm and dry    LABS                        10.0   2.53  )-----------( 141      ( 10 Dec 2019 16:47 )             32.4     CBC Full  -  ( 10 Dec 2019 16:47 )  WBC Count : 2.53 K/uL  RBC Count : 3.48 M/uL  Hemoglobin : 10.0 g/dL  Hematocrit : 32.4 %  Platelet Count - Automated : 141 K/uL  Mean Cell Volume : 93.1 fL  Mean Cell Hemoglobin : 28.7 pg  Mean Cell Hemoglobin Concentration : 30.9 %  Auto Neutrophil # : 1.00 K/uL  Auto Lymphocyte # : 0.77 K/uL  Auto Monocyte # : 0.53 K/uL  Auto Eosinophil # : 0.17 K/uL  Auto Basophil # : 0.05 K/uL  Auto Neutrophil % : 39.6 %  Auto Lymphocyte % : 30.4 %  Auto Monocyte % : 20.9 %  Auto Eosinophil % : 6.7 %  Auto Basophil % : 2.0 % MEDICAL ONCOLOGY CONSULTATION    CC: Multiple Myeloma    HPI: 79 year old woman with IgG kappa plasma cell neoplasm diagnosed in 2010 after she 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 in 2016 patient had progression of disease along with T12/L1 compression fracture and hip fracture s/p ORIF and RT. Patient has since underwent further therapy with RVd, and daratumumab based therapy which was stopped due to intoleraance. In 2017 pt had pathological fracture of L femur and underwent ORIF and RT and started on IRd, CyborD and rechallenged with daratumumab which patient could not tolerate. Patient started on carfizolmib and pomolidomide most recently started on elutuzumab/pomolidomide/dex with progression of disease and now admitted for DCEP (dexamethasone along with CIVI of cisplatin, etoposide and cyclophosphomide). After a prolonged discussion, pt opted to start the first cycle in the hospital and direct admission was arranged. Currently pt denies any further fever, SOB or CP. Patient denies any dysuria, or constipation but notes loose stools.     PAST MEDICAL & SURGICAL HISTORY:  As above  depression  LLE DVT (diagnosed at [facility] in 11/2019)    NKDA  Fm Hx NC; denies tobacco use; deneis EtOh use; lives at home    Home Medications:  acyclovir 400 mg oral tablet: 1 tab(s) orally 2 times a day (10 Dec 2019 12:52)  Eliquis 5 mg oral tablet: 1 tab(s) orally 2 times a day (10 Dec 2019 12:52)  fentaNYL 100 mcg/hr transdermal film, extended release: 1 film(s) transdermal every 72 hours (10 Dec 2019 12:52)  fentaNYL 25 mcg/hr transdermal film, extended release: 1 film(s) transdermal every 72 hours (10 Dec 2019 12:52)  Lexapro 20 mg oral tablet: 1 tab(s) orally once a day (10 Dec 2019 12:52)  oxyCODONE 20 mg oral tablet: 1 tab(s) orally every 6 hours (10 Dec 2019 12:52)      MEDICATIONS  (STANDING):  acyclovir   Oral Tab/Cap 400 milliGRAM(s) Oral two times a day  apixaban 5 milliGRAM(s) Oral two times a day  chlorhexidine 0.12% Liquid 15 milliLiter(s) Oral Mucosa two times a day  escitalopram 20 milliGRAM(s) Oral daily  fentaNYL   Patch  25 MICROgram(s)/Hr 1 Patch Transdermal every 72 hours  fentaNYL   Patch 100 MICROgram(s)/Hr. 1 Patch Transdermal every 48 hours    MEDICATIONS  (PRN):  bisacodyl 5 milliGRAM(s) Oral every 12 hours PRN Constipation  oxyCODONE    IR 20 milliGRAM(s) Oral every 4 hours PRN Severe Pain (7 - 10)    REVIEW OF SYSTEMS  General: Noncontributory; well appearing; Skin/Breast: multiple subcutaneous plasmacytoma; Ophthalmologic: Non-Contributory; ENMT: NC; Respiratory and Thorax: NC; Cardiovascular: NC;  Gastrointestinal: NC; Genitourinary: NC;Musculoskeletal:	myeloma bone disease; Neurological: NC; Psychiatric: depression; Hematology/Lymphatics: MM, LLE DVT; Endocrine: NC; Allergic/Immunologic: NC    Vital Signs Last 24 Hrs  T(C): 36.9 (10 Dec 2019 11:12), Max: 36.9 (10 Dec 2019 11:12)  T(F): 98.5 (10 Dec 2019 11:12), Max: 98.5 (10 Dec 2019 11:12)  HR: 72 (10 Dec 2019 11:12) (72 - 72)  BP: 118/64 (10 Dec 2019 11:12) (118/64 - 118/64)  RR: 20 (10 Dec 2019 11:12) (20 - 20)  SpO2: 99% (10 Dec 2019 11:12) (99% - 99%)    NAC/ NC/AT; PERRLA; EOMI  supple; MMM  A and O x 3  CTA b/l; no W/R/C; [facility] site C/D/I  Nl S1 S2 RR; no M  Ab soft; on HSM; no guarding  no CC; + edema; + subcutaneous mass c/w plasmacytoma  neuro exam non-focal  warm and dry    LABS                        10.0   2.53  )-----------( 141      ( 10 Dec 2019 16:47 )             32.4     CBC Full  -  ( 10 Dec 2019 16:47 )  WBC Count : 2.53 K/uL  RBC Count : 3.48 M/uL  Hemoglobin : 10.0 g/dL  Hematocrit : 32.4 %  Platelet Count - Automated : 141 K/uL  Mean Cell Volume : 93.1 fL  Mean Cell Hemoglobin : 28.7 pg  Mean Cell Hemoglobin Concentration : 30.9 %  Auto Neutrophil # : 1.00 K/uL  Auto Lymphocyte # : 0.77 K/uL  Auto Monocyte # : 0.53 K/uL  Auto Eosinophil # : 0.17 K/uL  Auto Basophil # : 0.05 K/uL  Auto Neutrophil % : 39.6 %  Auto Lymphocyte % : 30.4 %  Auto Monocyte % : 20.9 %  Auto Eosinophil % : 6.7 %  Auto Basophil % : 2.0 %    12-10    138  |  98  |  11  ----------------------------<  104<H>  4.1   |  29  |  0.63    Ca    9.6      10 Dec 2019 16:47  Phos  3.7     12-10  Mg     2.0     12-10    TPro  6.4  /  Alb  3.7  /  TBili  0.2  /  DBili  x   /  AST  17  /  ALT  15  /  AlkPhos  68  12-10 MEDICAL ONCOLOGY CONSULTATION    CC: Multiple Myeloma    HPI: 79 year old woman with IgG kappa plasma cell neoplasm diagnosed in 2010 after she 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 in 2016 patient had progression of disease along with T12/L1 compression fracture and hip fracture s/p ORIF and RT. Patient has since underwent further therapy with RVd, and daratumumab based therapy which was stopped due to intoleraance. In 2017 pt had pathological fracture of L femur and underwent ORIF and RT and started on IRd, CyborD and rechallenged with daratumumab which patient could not tolerate. Patient started on carfizolmib and pomolidomide most recently started on elutuzumab/pomolidomide/dex with progression of disease and now admitted for DCEP (dexamethasone along with CIVI of cisplatin, etoposide and cyclophosphomide). After a prolonged discussion, pt opted to start the first cycle in the hospital and direct admission was arranged. Currently pt denies any further fever, SOB or CP. Patient denies any dysuria, or constipation but notes loose stools.     PAST MEDICAL & SURGICAL HISTORY:  As above  depression  LLE DVT (diagnosed at [facility] in 11/2019)    NKDA  Fm Hx NC; denies tobacco use; deneis EtOh use; lives at home    Home Medications:  acyclovir 400 mg oral tablet: 1 tab(s) orally 2 times a day (10 Dec 2019 12:52)  Eliquis 5 mg oral tablet: 1 tab(s) orally 2 times a day (10 Dec 2019 12:52)  fentaNYL 100 mcg/hr transdermal film, extended release: 1 film(s) transdermal every 72 hours (10 Dec 2019 12:52)  fentaNYL 25 mcg/hr transdermal film, extended release: 1 film(s) transdermal every 72 hours (10 Dec 2019 12:52)  Lexapro 20 mg oral tablet: 1 tab(s) orally once a day (10 Dec 2019 12:52)  oxyCODONE 20 mg oral tablet: 1 tab(s) orally every 6 hours (10 Dec 2019 12:52)      MEDICATIONS  (STANDING):  acyclovir   Oral Tab/Cap 400 milliGRAM(s) Oral two times a day  apixaban 5 milliGRAM(s) Oral two times a day  chlorhexidine 0.12% Liquid 15 milliLiter(s) Oral Mucosa two times a day  escitalopram 20 milliGRAM(s) Oral daily  fentaNYL   Patch  25 MICROgram(s)/Hr 1 Patch Transdermal every 72 hours  fentaNYL   Patch 100 MICROgram(s)/Hr. 1 Patch Transdermal every 48 hours    MEDICATIONS  (PRN):  bisacodyl 5 milliGRAM(s) Oral every 12 hours PRN Constipation  oxyCODONE    IR 20 milliGRAM(s) Oral every 4 hours PRN Severe Pain (7 - 10)    REVIEW OF SYSTEMS  General: Noncontributory; well appearing; Skin/Breast: multiple subcutaneous plasmacytoma; Ophthalmologic: Non-Contributory; ENMT: NC; Respiratory and Thorax: NC; Cardiovascular: NC;  Gastrointestinal: NC; Genitourinary: NC;Musculoskeletal:	myeloma bone disease; Neurological: NC; Psychiatric: depression; Hematology/Lymphatics: MM, LLE DVT; Endocrine: NC; Allergic/Immunologic: NC    Vital Signs Last 24 Hrs  T(C): 36.9 (10 Dec 2019 11:12), Max: 36.9 (10 Dec 2019 11:12)  T(F): 98.5 (10 Dec 2019 11:12), Max: 98.5 (10 Dec 2019 11:12)  HR: 72 (10 Dec 2019 11:12) (72 - 72)  BP: 118/64 (10 Dec 2019 11:12) (118/64 - 118/64)  RR: 20 (10 Dec 2019 11:12) (20 - 20)  SpO2: 99% (10 Dec 2019 11:12) (99% - 99%)    NAC/ NC/AT; PERRLA; EOMI  supple; MMM  A and O x 3  CTA b/l; no W/R/C; [facility] site C/D/I  Nl S1 S2 RR; no M  Ab soft; on HSM; no guarding  no CC; + edema; + subcutaneous mass c/w plasmacytoma  neuro exam non-focal  warm and dry    LABS                        10.0   2.53  )-----------( 141      ( 10 Dec 2019 16:47 )             32.4     CBC Full  -  ( 10 Dec 2019 16:47 )  WBC Count : 2.53 K/uL  RBC Count : 3.48 M/uL  Hemoglobin : 10.0 g/dL  Hematocrit : 32.4 %  Platelet Count - Automated : 141 K/uL  Mean Cell Volume : 93.1 fL  Mean Cell Hemoglobin : 28.7 pg  Mean Cell Hemoglobin Concentration : 30.9 %  Auto Neutrophil # : 1.00 K/uL  Auto Lymphocyte # : 0.77 K/uL  Auto Monocyte # : 0.53 K/uL  Auto Eosinophil # : 0.17 K/uL  Auto Basophil # : 0.05 K/uL  Auto Neutrophil % : 39.6 %  Auto Lymphocyte % : 30.4 %  Auto Monocyte % : 20.9 %  Auto Eosinophil % : 6.7 %  Auto Basophil % : 2.0 %    12-10    138  |  98  |  11  ----------------------------<  104<H>  4.1   |  29  |  0.63    Ca    9.6      10 Dec 2019 16:47  Phos  3.7     12-10  Mg     2.0     12-10    TPro  6.4  /  Alb  3.7  /  TBili  0.2  /  DBili  x   /  AST  17  /  ALT  15  /  AlkPhos  68  12-10      < from: US Duplex Venous Lower Ext Complete, Bilateral (12.10.19 @ 14:44) >  No evidence of deep venous thrombosis in either lower extremity.    Left groin mass measuring 6.1 x 2.7 x 3.3 cm abutting the adjacent common   femoral vein. Findings may reflect groin hematoma. Lymphadenopathy is   also a consideration.    < end of copied text >

## 2019-12-10 NOTE — H&P ADULT - HISTORY OF PRESENT ILLNESS
HPI:    79yFemale PMH    PAST MEDICAL & SURGICAL HISTORY:      Review of Systems:   CONSTITUTIONAL: No fever, weight loss, or fatigue  EYES: No eye pain, visual disturbances, or discharge  ENMT:  No difficulty hearing, tinnitus, vertigo; No sinus or throat pain  NECK: No pain or stiffness  BREASTS: No pain, masses, or nipple discharge  RESPIRATORY: No cough, wheezing, chills or hemoptysis; No shortness of breath  CARDIOVASCULAR: No chest pain, palpitations. +occasional chest tightness/pain, none now  GASTROINTESTINAL: No abdominal or epigastric pain. No nausea, vomiting, or hematemesis; No diarrhea or constipation. No melena or hematochezia.  GENITOURINARY: No dysuria, frequency, hematuria, or incontinence  NEUROLOGICAL: memory loss, loss of strength, numbness, or tremors. Occasional headaches  SKIN: No itching, burning, rashes, or lesions   LYMPH NODES: No enlarged glands  ENDOCRINE: No heat or cold intolerance; No hair loss  MUSCULOSKELETAL: Lower extremity pain, swelling  PSYCHIATRIC: No depression, anxiety, mood swings, or difficulty sleeping  HEME/LYMPH: No easy bruising, or bleeding gums  ALLERGY AND IMMUNOLOGIC: No hives or eczema    Allergies    No Known Allergies    Intolerances        Social History:   Denies smoking, drinking or drug use  LIves at home with     FAMILY HISTORY:  No family history of multiple myeloma    MEDICATIONS  (STANDING):  acyclovir   Oral Tab/Cap 400 milliGRAM(s) Oral two times a day  apixaban 5 milliGRAM(s) Oral two times a day  escitalopram 20 milliGRAM(s) Oral daily  fentaNYL   Patch  25 MICROgram(s)/Hr 1 Patch Transdermal every 72 hours  fentaNYL   Patch 100 MICROgram(s)/Hr. 1 Patch Transdermal every 48 hours    MEDICATIONS  (PRN):  oxyCODONE    IR 20 milliGRAM(s) Oral every 4 hours PRN Severe Pain (7 - 10)      T(C): 36.9 (12-10-19 @ 11:12), Max: 36.9 (12-10-19 @ 11:12)  HR: 72 (12-10-19 @ 11:12) (72 - 72)  BP: 118/64 (12-10-19 @ 11:12) (118/64 - 118/64)  RR: 20 (12-10-19 @ 11:12) (20 - 20)  SpO2: 99% (12-10-19 @ 11:12) (99% - 99%)  Height (cm): 149.9 (12-10-19 @ 11:12)  Weight (kg): 66.9 (12-10-19 @ 11:12)  BMI (kg/m2): 29.8 (12-10-19 @ 11:12)  BSA (m2): 1.62 (12-10-19 @ 11:12)  CAPILLARY BLOOD GLUCOSE        I&O's Summary    10 Dec 2019 07:01  -  10 Dec 2019 12:58  --------------------------------------------------------  IN: 0 mL / OUT: 250 mL / NET: -250 mL        PHYSICAL EXAM:  GENERAL: NAD, well-developed, pleasant  HEAD:  Atraumatic, Normocephalic  EYES: conjunctiva and sclera clear  NECK: Supple, No elevated JVD  CHEST/LUNG: Clear to auscultation bilaterally; No wheeze  HEART: Regular rate and rhythm; No murmurs, rubs, or gallops  ABDOMEN: Soft, Nontender, Nondistended; Bowel sounds present  EXTREMITIES:  2+ Peripheral Pulses, Lower extremity edema, bony prominence over left later ankle with what appears to be a small erythematous nodule on top  PSYCH: AAOx3  NEUROLOGY: CN II-XII grossly intact, moving all extremities      LABS:                Urinalysis Basic - ( 10 Dec 2019 12:21 )    Color: LIGHT YELLOW / Appearance: CLEAR / S.008 / pH: 7.5  Gluc: NEGATIVE / Ketone: NEGATIVE  / Bili: NEGATIVE / Urobili: NORMAL   Blood: NEGATIVE / Protein: NEGATIVE / Nitrite: NEGATIVE   Leuk Esterase: NEGATIVE / RBC: x / WBC x   Sq Epi: x / Non Sq Epi: x / Bacteria: x          RADIOLOGY & ADDITIONAL TESTS:    ECG Personally Reviewed -     Imaging Personally Reviewed:    Consultant(s) Notes Reviewed:      Care Discussed with Consultants/Other Providers: Case discussed with Dr. Meade 79 year old lady with multiple myeloma diagnosed in  with disease progression c/b multiple fractures and DVT despite multiple lines of therapy presents for 4 days of planned inpatient chemo. The patient states that she currently feels weaker and that she feels like her disease is progressing. She has had multiple fractures related to her multiple myeloma and a few weeks was diagnosed with a DVT. She does have frequent episodes of nausea and also constipation (likely a result of her home opioids). She denies any fevers, chills, shortness of breath.    PAST MEDICAL & SURGICAL HISTORY:  The patient denies any other medical problems, no reports of DM, HTN, HLD    Review of Systems:   CONSTITUTIONAL: No fever, weight loss, +fatigue  EYES: No eye pain, visual disturbances, or discharge  ENMT:  No difficulty hearing, tinnitus, vertigo; No sinus or throat pain  NECK: No pain or stiffness  BREASTS: No pain, masses, or nipple discharge  RESPIRATORY: No cough, wheezing, chills or hemoptysis; No shortness of breath  CARDIOVASCULAR: No chest pain, palpitations. +occasional chest tightness/pain, none now  GASTROINTESTINAL: No abdominal or epigastric pain. No nausea, vomiting, or hematemesis; No diarrhea. No melena or hematochezia. +Constipation  GENITOURINARY: No dysuria, frequency, hematuria, or incontinence  NEUROLOGICAL: memory loss, loss of strength, numbness, or tremors. Occasional headaches  SKIN: No itching, burning, rashes, or lesions   LYMPH NODES: No enlarged glands  ENDOCRINE: No heat or cold intolerance; No hair loss  MUSCULOSKELETAL: Lower extremity pain, swelling  PSYCHIATRIC: No depression, anxiety, mood swings, or difficulty sleeping  HEME/LYMPH: No easy bruising, or bleeding gums  ALLERGY AND IMMUNOLOGIC: No hives or eczema    Allergies    No Known Allergies    Intolerances        Social History:   Denies smoking, drinking or drug use  LIves at home with     FAMILY HISTORY:  No family history of multiple myeloma    MEDICATIONS  (STANDING):  acyclovir   Oral Tab/Cap 400 milliGRAM(s) Oral two times a day  apixaban 5 milliGRAM(s) Oral two times a day  escitalopram 20 milliGRAM(s) Oral daily  fentaNYL   Patch  25 MICROgram(s)/Hr 1 Patch Transdermal every 72 hours  fentaNYL   Patch 100 MICROgram(s)/Hr. 1 Patch Transdermal every 48 hours    MEDICATIONS  (PRN):  oxyCODONE    IR 20 milliGRAM(s) Oral every 4 hours PRN Severe Pain (7 - 10)      T(C): 36.9 (12-10-19 @ 11:12), Max: 36.9 (12-10-19 @ 11:12)  HR: 72 (12-10-19 @ 11:12) (72 - 72)  BP: 118/64 (12-10-19 @ 11:12) (118/64 - 118/64)  RR: 20 (12-10-19 @ 11:12) (20 - 20)  SpO2: 99% (12-10-19 @ 11:12) (99% - 99%)  Height (cm): 149.9 (12-10-19 @ 11:12)  Weight (kg): 66.9 (12-10-19 @ 11:12)  BMI (kg/m2): 29.8 (12-10-19 @ 11:12)  BSA (m2): 1.62 (12-10-19 @ 11:12)  CAPILLARY BLOOD GLUCOSE        I&O's Summary    10 Dec 2019 07:01  -  10 Dec 2019 12:58  --------------------------------------------------------  IN: 0 mL / OUT: 250 mL / NET: -250 mL        PHYSICAL EXAM:  GENERAL: NAD, well-developed, pleasant  HEAD:  Atraumatic, Normocephalic  EYES: conjunctiva and sclera clear  NECK: Supple, No elevated JVD  CHEST/LUNG: Clear to auscultation bilaterally; No wheeze  HEART: Regular rate and rhythm; No murmurs, rubs, or gallops  ABDOMEN: Soft, Nontender, Nondistended; Bowel sounds present  EXTREMITIES:  2+ Peripheral Pulses, Lower extremity edema, bony prominence over left later ankle with what appears to be a small erythematous nodule on top  PSYCH: AAOx3  NEUROLOGY: CN II-XII grossly intact, moving all extremities      LABS:                Urinalysis Basic - ( 10 Dec 2019 12:21 )    Color: LIGHT YELLOW / Appearance: CLEAR / S.008 / pH: 7.5  Gluc: NEGATIVE / Ketone: NEGATIVE  / Bili: NEGATIVE / Urobili: NORMAL   Blood: NEGATIVE / Protein: NEGATIVE / Nitrite: NEGATIVE   Leuk Esterase: NEGATIVE / RBC: x / WBC x   Sq Epi: x / Non Sq Epi: x / Bacteria: x          RADIOLOGY & ADDITIONAL TESTS:    ECG Personally Reviewed -     Imaging Personally Reviewed:    Consultant(s) Notes Reviewed:      Care Discussed with Consultants/Other Providers: Case discussed with Dr. Meade

## 2019-12-10 NOTE — H&P ADULT - ASSESSMENT
79 year old lady with multiple myeloma diagnosed in 2010 with disease progression c/b multiple fractures and DVT despite multiple lines of therapy presents for 4 days of planned inpatient chemo.

## 2019-12-10 NOTE — CONSULT NOTE ADULT - ASSESSMENT
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 now to start dexamethasone (20 mg po once daily), cyclophasphomide (250 mg/meter sq x 4 days CIVI), etoposide (25 mg/meter sq x 4 days [facility]I) and cisplatin (7.5 mg meter sq x 4 days CIVI)  -I discussed goals of care in detail with patient; risks and benefits of the chemotherapy regimen discussed in detail and patient understands and informed consent obtained and placed in chart  -continue with acyclovir 400 mg once daily  -add levofloxacin 500 mg once daily and diflucan 200 mg once daily for prophylaxis  -will arrange for neulasta to be done in the oncology office on Monday 12/16/19  -check daily chemistry, Mg, CBC and LFTs  -start normal saline prehydration over night  -obtain myeloma panel including QIGG and kappa/lambda ratio  -continue with pain meds    PROBLEM #2 Neutropenia - likely from MM disease  -neutropenic precaution  -no need for GMCSF at this time but will plan for neulasta on 12/16/19 in the oncology office    PROBLEM #3 Anemia - likely from MM disease  -repeat iron study, retic, B12/folate  -no need for PRBC unless Hg < 7 g/dL or if pt is symptiomatic    PROBLEM #4 DVT of LLE  -continue with eliquis at current dose    MOLST form from the outpatient clinica available; OOB prn; tentative DC to home after completion of DCEP; d/w hospitalist Dr. Schaefer and chemotherapy nurse 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 now to start dexamethasone (20 mg po once daily), cyclophasphomide (250 mg/meter sq x 4 days CIVI), etoposide (25 mg/meter sq x 4 days CIVI) and cisplatin (7.5 mg meter sq x 4 days CIVI)  -I discussed goals of care in detail with patient; risks and benefits of the chemotherapy regimen discussed in detail and patient understands and informed consent obtained and placed in chart  -continue with acyclovir 400 mg once daily  -add levofloxacin 500 mg once daily and diflucan 200 mg once daily for prophylaxis  -will arrange for neulasta to be done in the oncology office on Monday 12/16/19  -check daily chemistry, Mg, CBC and LFTs  -start normal saline prehydration over night  -obtain myeloma panel including QIGG and kappa/lambda ratio  -continue with pain meds    PROBLEM #2 Neutropenia - likely from MM disease  -neutropenic precaution  -no need for GMCSF at this time but will plan for neulasta on 12/16/19 in the oncology office    PROBLEM #3 Anemia - likely from MM disease  -repeat iron study, retic, B12/folate  -no need for PRBC unless Hg < 7 g/dL or if pt is symptiomatic    PROBLEM #4 DVT of LLE - repeat doppler is negative  -continue with eliquis at current dose    MOLST form from the outpatient clinica available; OOB prn; tentative DC to home after completion of DCEP; d/w hospitalist Dr. Schaefer and chemotherapy nurse

## 2019-12-10 NOTE — H&P ADULT - PROBLEM SELECTOR PLAN 1
Diagnosed in 2010  s/p multiple lines of therapy including chemo and radiation  c/b multiple fractures and cancer related pain  Case discussed with Dr. Meade    Plan for 4 days of Cisplatin, Etoposide, Cyclophosphomide, and Dex  Will monitor TLS labs daily

## 2019-12-10 NOTE — H&P ADULT - PROBLEM SELECTOR PLAN 6
Transitions of Care Status:  1.  Name of PCP: Dr. Meade  2.  PCP Contacted on Admission: [x] Y    [ ] N    3.  PCP contacted at Discharge: [ ] Y    [ ] N    [ ] N/A  4.  Post-Discharge Appointment Date and Location:  5.  Summary of Handoff given to PCP:

## 2019-12-11 LAB
ALBUMIN SERPL ELPH-MCNC: 3.1 G/DL — LOW (ref 3.3–5)
ALP SERPL-CCNC: 51 U/L — SIGNIFICANT CHANGE UP (ref 40–120)
ALT FLD-CCNC: 11 U/L — SIGNIFICANT CHANGE UP (ref 4–33)
ANION GAP SERPL CALC-SCNC: 11 MMO/L — SIGNIFICANT CHANGE UP (ref 7–14)
AST SERPL-CCNC: 13 U/L — SIGNIFICANT CHANGE UP (ref 4–32)
BASOPHILS # BLD AUTO: 0.04 K/UL — SIGNIFICANT CHANGE UP (ref 0–0.2)
BASOPHILS NFR BLD AUTO: 2 % — SIGNIFICANT CHANGE UP (ref 0–2)
BILIRUB SERPL-MCNC: 0.3 MG/DL — SIGNIFICANT CHANGE UP (ref 0.2–1.2)
BUN SERPL-MCNC: 9 MG/DL — SIGNIFICANT CHANGE UP (ref 7–23)
CALCIUM SERPL-MCNC: 9 MG/DL — SIGNIFICANT CHANGE UP (ref 8.4–10.5)
CHLORIDE SERPL-SCNC: 105 MMOL/L — SIGNIFICANT CHANGE UP (ref 98–107)
CO2 SERPL-SCNC: 26 MMOL/L — SIGNIFICANT CHANGE UP (ref 22–31)
CREAT SERPL-MCNC: 0.65 MG/DL — SIGNIFICANT CHANGE UP (ref 0.5–1.3)
EOSINOPHIL # BLD AUTO: 0.22 K/UL — SIGNIFICANT CHANGE UP (ref 0–0.5)
EOSINOPHIL NFR BLD AUTO: 10.9 % — HIGH (ref 0–6)
FERRITIN SERPL-MCNC: 265.1 NG/ML — HIGH (ref 15–150)
FOLATE SERPL-MCNC: > 20 NG/ML — HIGH (ref 4.7–20)
GLUCOSE SERPL-MCNC: 86 MG/DL — SIGNIFICANT CHANGE UP (ref 70–99)
HCT VFR BLD CALC: 33.5 % — LOW (ref 34.5–45)
HGB BLD-MCNC: 10.3 G/DL — LOW (ref 11.5–15.5)
IMM GRANULOCYTES NFR BLD AUTO: 0 % — SIGNIFICANT CHANGE UP (ref 0–1.5)
IRON SATN MFR SERPL: 178 UG/DL — SIGNIFICANT CHANGE UP (ref 140–530)
IRON SATN MFR SERPL: 47 UG/DL — SIGNIFICANT CHANGE UP (ref 30–160)
KAPPA FREE LIGHT CHAINS, SERUM: 32.36 MG/DL — HIGH (ref 0.33–1.94)
LAMBDA FREE LIGHT CHAINS, SERUM: 0.27 MG/DL — LOW (ref 0.57–2.63)
LDH SERPL L TO P-CCNC: 164 U/L — SIGNIFICANT CHANGE UP (ref 135–225)
LYMPHOCYTES # BLD AUTO: 0.68 K/UL — LOW (ref 1–3.3)
LYMPHOCYTES # BLD AUTO: 33.7 % — SIGNIFICANT CHANGE UP (ref 13–44)
MAGNESIUM SERPL-MCNC: 2 MG/DL — SIGNIFICANT CHANGE UP (ref 1.6–2.6)
MANUAL SMEAR VERIFICATION: SIGNIFICANT CHANGE UP
MCHC RBC-ENTMCNC: 28.3 PG — SIGNIFICANT CHANGE UP (ref 27–34)
MCHC RBC-ENTMCNC: 30.7 % — LOW (ref 32–36)
MCV RBC AUTO: 92 FL — SIGNIFICANT CHANGE UP (ref 80–100)
MONOCYTES # BLD AUTO: 0.44 K/UL — SIGNIFICANT CHANGE UP (ref 0–0.9)
MONOCYTES NFR BLD AUTO: 21.8 % — HIGH (ref 2–14)
NEUTROPHILS # BLD AUTO: 0.64 K/UL — LOW (ref 1.8–7.4)
NEUTROPHILS NFR BLD AUTO: 31.6 % — LOW (ref 43–77)
NRBC # FLD: 0 K/UL — SIGNIFICANT CHANGE UP (ref 0–0)
PHOSPHATE SERPL-MCNC: 3.5 MG/DL — SIGNIFICANT CHANGE UP (ref 2.5–4.5)
PLATELET # BLD AUTO: 132 K/UL — LOW (ref 150–400)
PMV BLD: 9 FL — SIGNIFICANT CHANGE UP (ref 7–13)
POTASSIUM SERPL-MCNC: 4.7 MMOL/L — SIGNIFICANT CHANGE UP (ref 3.5–5.3)
POTASSIUM SERPL-SCNC: 4.7 MMOL/L — SIGNIFICANT CHANGE UP (ref 3.5–5.3)
PROT SERPL-MCNC: 5.8 G/DL — LOW (ref 6–8.3)
RBC # BLD: 3.64 M/UL — LOW (ref 3.8–5.2)
RBC # FLD: 15.9 % — HIGH (ref 10.3–14.5)
RETICS #: 43 K/UL — SIGNIFICANT CHANGE UP (ref 25–125)
RETICS/RBC NFR: 1.2 % — SIGNIFICANT CHANGE UP (ref 0.5–2.5)
SODIUM SERPL-SCNC: 142 MMOL/L — SIGNIFICANT CHANGE UP (ref 135–145)
UIBC SERPL-MCNC: 130.7 UG/DL — SIGNIFICANT CHANGE UP (ref 110–370)
URATE SERPL-MCNC: 4 MG/DL — SIGNIFICANT CHANGE UP (ref 2.5–7)
VIT B12 SERPL-MCNC: 786 PG/ML — SIGNIFICANT CHANGE UP (ref 200–900)
WBC # BLD: 2.02 K/UL — LOW (ref 3.8–10.5)
WBC # FLD AUTO: 2.02 K/UL — LOW (ref 3.8–10.5)

## 2019-12-11 PROCEDURE — 99233 SBSQ HOSP IP/OBS HIGH 50: CPT

## 2019-12-11 RX ORDER — DEXAMETHASONE 0.5 MG/5ML
20 ELIXIR ORAL DAILY
Refills: 0 | Status: DISCONTINUED | OUTPATIENT
Start: 2019-12-11 | End: 2019-12-12

## 2019-12-11 RX ORDER — CYCLOPHOSPHAMIDE 100 MG
400 VIAL (EA) INTRAVENOUS DAILY
Refills: 0 | Status: DISCONTINUED | OUTPATIENT
Start: 2019-12-11 | End: 2019-12-14

## 2019-12-11 RX ORDER — ACETAMINOPHEN 500 MG
650 TABLET ORAL EVERY 6 HOURS
Refills: 0 | Status: DISCONTINUED | OUTPATIENT
Start: 2019-12-11 | End: 2019-12-16

## 2019-12-11 RX ORDER — PROCHLORPERAZINE MALEATE 5 MG
10 TABLET ORAL EVERY 6 HOURS
Refills: 0 | Status: DISCONTINUED | OUTPATIENT
Start: 2019-12-11 | End: 2019-12-16

## 2019-12-11 RX ORDER — ONDANSETRON 8 MG/1
8 TABLET, FILM COATED ORAL DAILY
Refills: 0 | Status: DISCONTINUED | OUTPATIENT
Start: 2019-12-11 | End: 2019-12-12

## 2019-12-11 RX ORDER — DIPHENHYDRAMINE HCL 50 MG
25 CAPSULE ORAL EVERY 6 HOURS
Refills: 0 | Status: DISCONTINUED | OUTPATIENT
Start: 2019-12-11 | End: 2019-12-16

## 2019-12-11 RX ORDER — CHLORHEXIDINE GLUCONATE 213 G/1000ML
1 SOLUTION TOPICAL
Refills: 0 | Status: DISCONTINUED | OUTPATIENT
Start: 2019-12-11 | End: 2019-12-16

## 2019-12-11 RX ADMIN — OXYCODONE HYDROCHLORIDE 20 MILLIGRAM(S): 5 TABLET ORAL at 10:27

## 2019-12-11 RX ADMIN — OXYCODONE HYDROCHLORIDE 20 MILLIGRAM(S): 5 TABLET ORAL at 10:57

## 2019-12-11 RX ADMIN — FENTANYL CITRATE 1 PATCH: 50 INJECTION INTRAVENOUS at 18:23

## 2019-12-11 RX ADMIN — FLUCONAZOLE 200 MILLIGRAM(S): 150 TABLET ORAL at 12:43

## 2019-12-11 RX ADMIN — APIXABAN 5 MILLIGRAM(S): 2.5 TABLET, FILM COATED ORAL at 09:21

## 2019-12-11 RX ADMIN — CHLORHEXIDINE GLUCONATE 15 MILLILITER(S): 213 SOLUTION TOPICAL at 18:24

## 2019-12-11 RX ADMIN — Medication 400 MILLIGRAM(S): at 09:21

## 2019-12-11 RX ADMIN — SODIUM CHLORIDE 75 MILLILITER(S): 9 INJECTION INTRAMUSCULAR; INTRAVENOUS; SUBCUTANEOUS at 08:18

## 2019-12-11 RX ADMIN — FENTANYL CITRATE 1 PATCH: 50 INJECTION INTRAVENOUS at 07:21

## 2019-12-11 RX ADMIN — CHLORHEXIDINE GLUCONATE 15 MILLILITER(S): 213 SOLUTION TOPICAL at 07:19

## 2019-12-11 RX ADMIN — Medication 10 MILLIGRAM(S): at 11:05

## 2019-12-11 RX ADMIN — OXYCODONE HYDROCHLORIDE 20 MILLIGRAM(S): 5 TABLET ORAL at 19:15

## 2019-12-11 RX ADMIN — Medication 10 MILLIGRAM(S): at 22:26

## 2019-12-11 RX ADMIN — ONDANSETRON 8 MILLIGRAM(S): 8 TABLET, FILM COATED ORAL at 11:04

## 2019-12-11 RX ADMIN — ESCITALOPRAM OXALATE 20 MILLIGRAM(S): 10 TABLET, FILM COATED ORAL at 12:42

## 2019-12-11 RX ADMIN — FENTANYL CITRATE 1 PATCH: 50 INJECTION INTRAVENOUS at 07:19

## 2019-12-11 RX ADMIN — Medication 41.67 MILLIGRAM(S): at 11:57

## 2019-12-11 RX ADMIN — Medication 20 MILLIGRAM(S): at 11:05

## 2019-12-11 NOTE — PROGRESS NOTE ADULT - ASSESSMENT
79 year old lady with multiple myeloma diagnosed in 2010 with disease progression c/b multiple fractures and DVT despite multiple lines of therapy presents for 4 days of planned inpatient chemo.  Chest 1 View- PORTABLE-Routine (12.10.19 @ 16:54) >  IMPRESSION:  Bibasilar linear atelectasis otherwise clear lungs. No pleural effusion   or pneumothorax.  The cardiomediastinal silhouette is normal in size and contour. Tip of   the right Mediport catheter within SVC.    < from: US Duplex Venous Lower Ext Complete, Bilateral (12.10.19 @ 14:44) >  IMPRESSION:   No evidence of deep venous thrombosis in either lower extremity.  Left groin mass measuring 6.1 x 2.7 x 3.3 cm abutting the adjacent common   femoral vein. Findings may reflect groin hematoma. Lymphadenopathy is   also a consideration.      no dvt  MM w/o remission  L groin mass 6cm 79 year old lady with multiple myeloma diagnosed in 2010 with disease progression c/b multiple fractures and DVT despite multiple lines of therapy presents for 4 days of planned inpatient chemo.  pancytopenia with neutropenia anc 640   L groin mass- ? Hematoma- consider pelvic ct to asses    Chest 1 View- PORTABLE-Routine (12.10.19 @ 16:54) >  IMPRESSION:  Bibasilar linear atelectasis otherwise clear lungs. No pleural effusion   or pneumothorax.  The cardiomediastinal silhouette is normal in size and contour. Tip of   the right Mediport catheter within SVC.    < from: US Duplex Venous Lower Ext Complete, Bilateral (12.10.19 @ 14:44) >  IMPRESSION:   No evidence of deep venous thrombosis in either lower extremity.  Left groin mass measuring 6.1 x 2.7 x 3.3 cm abutting the adjacent common   femoral vein. Findings may reflect groin hematoma. Lymphadenopathy is   also a consideration.      no dvt  MM w/o remission  L groin mass 6cm

## 2019-12-11 NOTE — PROGRESS NOTE ADULT - SUBJECTIVE AND OBJECTIVE BOX
MED ONC ATT Consult PN    S: Events noted    Vital Signs Last 24 Hrs  T(C): 36.8 (10 Dec 2019 21:02), Max: 36.9 (10 Dec 2019 11:12)  T(F): 98.2 (10 Dec 2019 21:02), Max: 98.5 (10 Dec 2019 11:12)  HR: 68 (10 Dec 2019 21:02) (68 - 72)  BP: 124/68 (10 Dec 2019 21:02) (118/64 - 124/68)  RR: 19 (10 Dec 2019 21:02) (19 - 20)  SpO2: 96% (10 Dec 2019 21:02) (96% - 99%) MED ONC ATT Consult PN    S: Events noted    Vital Signs Last 24 Hrs  T(C): 36.8 (10 Dec 2019 21:02), Max: 36.9 (10 Dec 2019 11:12)  T(F): 98.2 (10 Dec 2019 21:02), Max: 98.5 (10 Dec 2019 11:12)  HR: 68 (10 Dec 2019 21:02) (68 - 72)  BP: 124/68 (10 Dec 2019 21:02) (118/64 - 124/68)  RR: 19 (10 Dec 2019 21:02) (19 - 20)  SpO2: 96% (10 Dec 2019 21:02) (96% - 99%)    NAC/ NC/AT; PERRLA; EOMI  supple; MMM  A and O x 3  CTA b/l; no W/R/C; mediport site C/D/I  Nl S1 S2 RR; no M  Ab soft; on HSM; no guarding  no CC; + edema; + subcutaneous mass c/w plasmacytoma  neuro exam non-focal  warm and dry    LABS                          10.3   2.02  )-----------( 132      ( 11 Dec 2019 06:15 )             33.5       CBC Full  -  ( 11 Dec 2019 06:15 )  WBC Count : 2.02 K/uL  RBC Count : 3.64 M/uL  Hemoglobin : 10.3 g/dL  Hematocrit : 33.5 %  Platelet Count - Automated : 132 K/uL  Mean Cell Volume : 92.0 fL  Mean Cell Hemoglobin : 28.3 pg  Mean Cell Hemoglobin Concentration : 30.7 %  Auto Neutrophil # : 0.64 K/uL  Auto Lymphocyte # : 0.68 K/uL  Auto Monocyte # : 0.44 K/uL  Auto Eosinophil # : 0.22 K/uL  Auto Basophil # : 0.04 K/uL  Auto Neutrophil % : 31.6 %  Auto Lymphocyte % : 33.7 %  Auto Monocyte % : 21.8 %  Auto Eosinophil % : 10.9 %  Auto Basophil % : 2.0 %                 10.0   2.53  )-----------( 141      ( 10 Dec 2019 16:47 )             32.4     CBC Full  -  ( 10 Dec 2019 16:47 )  WBC Count : 2.53 K/uL  RBC Count : 3.48 M/uL  Hemoglobin : 10.0 g/dL  Hematocrit : 32.4 %  Platelet Count - Automated : 141 K/uL  Mean Cell Volume : 93.1 fL  Mean Cell Hemoglobin : 28.7 pg  Mean Cell Hemoglobin Concentration : 30.9 %  Auto Neutrophil # : 1.00 K/uL  Auto Lymphocyte # : 0.77 K/uL  Auto Monocyte # : 0.53 K/uL  Auto Eosinophil # : 0.17 K/uL  Auto Basophil # : 0.05 K/uL  Auto Neutrophil % : 39.6 %  Auto Lymphocyte % : 30.4 %  Auto Monocyte % : 20.9 %  Auto Eosinophil % : 6.7 %  Auto Basophil % : 2.0 %    12-10    138  |  98  |  11  ----------------------------<  104<H>  4.1   |  29  |  0.63    Ca    9.6      10 Dec 2019 16:47  Phos  3.7     12-10  Mg     2.0     12-10    TPro  6.4  /  Alb  3.7  /  TBili  0.2  /  DBili  x   /  AST  17  /  ALT  15  /  AlkPhos  68  12-10      < from: US Duplex Venous Lower Ext Complete, Bilateral (12.10.19 @ 14:44) >  No evidence of deep venous thrombosis in either lower extremity.    Left groin mass measuring 6.1 x 2.7 x 3.3 cm abutting the adjacent common   femoral vein. Findings may reflect groin hematoma. Lymphadenopathy is   also a consideration.    < end of copied text >NAC/ NC/AT; PERRLA; EOMI  supple; MMM  A and O x 3  [facility] b[facility]l; [facility]  Nl S1 S2 RR; no M  Ab soft; on HSM; no guarding  no CC; + edema; + subcutaneous mass c/w plasmacytoma  neuro exam non-focal  warm and dry    LABS                        10.0   2.53  )-----------( 141      ( 10 Dec 2019 16:47 )             32.4     CBC Full  -  ( 10 Dec 2019 16:47 )  WBC Count : 2.53 K/uL  RBC Count : 3.48 M/uL  Hemoglobin : 10.0 g/dL  Hematocrit : 32.4 %  Platelet Count - Automated : 141 K/uL  Mean Cell Volume : 93.1 fL  Mean Cell Hemoglobin : 28.7 pg  Mean Cell Hemoglobin Concentration : 30.9 %  Auto Neutrophil # : 1.00 K/uL  Auto Lymphocyte # : 0.77 K/uL  Auto Monocyte # : 0.53 K/uL  Auto Eosinophil # : 0.17 K/uL  Auto Basophil # : 0.05 K/uL  Auto Neutrophil % : 39.6 %  Auto Lymphocyte % : 30.4 %  Auto Monocyte % : 20.9 %  Auto Eosinophil % : 6.7 %  Auto Basophil % : 2.0 %    12-11    pending    12-10    138  |  98  |  11  ----------------------------<  104<H>  4.1   |  29  |  0.63    Ca    9.6      10 Dec 2019 16:47  Phos  3.7     12-10  Mg     2.0     12-10    TPro  6.4  /  Alb  3.7  /  TBili  0.2  /  DBili  x   /  AST  17  /  ALT  15  /  AlkPhos  68  12-10      < from: US Duplex Venous Lower Ext Complete, Bilateral (12.10.19 @ 14:44) >  No evidence of deep venous thrombosis in either lower extremity.    Left groin mass measuring 6.1 x 2.7 x 3.3 cm abutting the adjacent common   femoral vein. Findings may reflect groin hematoma. Lymphadenopathy is   also a consideration.    < end of copied text > MED ONC ATT Consult PN    S: Events noted; pt has no complaint. Pt denies SOB or CP. Pain adequately controlled.    Vital Signs Last 24 Hrs  T(C): 36.8 (10 Dec 2019 21:02), Max: 36.9 (10 Dec 2019 11:12)  T(F): 98.2 (10 Dec 2019 21:02), Max: 98.5 (10 Dec 2019 11:12)  HR: 68 (10 Dec 2019 21:02) (68 - 72)  BP: 124/68 (10 Dec 2019 21:02) (118/64 - 124/68)  RR: 19 (10 Dec 2019 21:02) (19 - 20)  SpO2: 96% (10 Dec 2019 21:02) (96% - 99%)    NAC/ NC/AT; PERRLA; EOMI  supple; MMM  A and O x 3  CT[facility] b[facility]l; [facility]  Nl S1 S2 RR; no M  Ab soft; on HSM; no guarding  no CC; + edema; + subcutaneous masses c/w plasmacytoma  neuro exam non-focal  warm and dry    LABS                          10.3   2.02  )-----------( 132      ( 11 Dec 2019 06:15 )             33.5       CBC Full  -  ( 11 Dec 2019 06:15 )  WBC Count : 2.02 K/uL  RBC Count : 3.64 M/uL  Hemoglobin : 10.3 g/dL  Hematocrit : 33.5 %  Platelet Count - Automated : 132 K/uL  Mean Cell Volume : 92.0 fL  Mean Cell Hemoglobin : 28.3 pg  Mean Cell Hemoglobin Concentration : 30.7 %  Auto Neutrophil # : 0.64 K/uL  Auto Lymphocyte # : 0.68 K/uL  Auto Monocyte # : 0.44 K/uL  Auto Eosinophil # : 0.22 K/uL  Auto Basophil # : 0.04 K/uL  Auto Neutrophil % : 31.6 %  Auto Lymphocyte % : 33.7 %  Auto Monocyte % : 21.8 %  Auto Eosinophil % : 10.9 %  Auto Basophil % : 2.0 %                 10.0   2.53  )-----------( 141      ( 10 Dec 2019 16:47 )             32.4     CBC Full  -  ( 10 Dec 2019 16:47 )  WBC Count : 2.53 K/uL  RBC Count : 3.48 M/uL  Hemoglobin : 10.0 g/dL  Hematocrit : 32.4 %  Platelet Count - Automated : 141 K/uL  Mean Cell Volume : 93.1 fL  Mean Cell Hemoglobin : 28.7 pg  Mean Cell Hemoglobin Concentration : 30.9 %  Auto Neutrophil # : 1.00 K/uL  Auto Lymphocyte # : 0.77 K/uL  Auto Monocyte # : 0.53 K/uL  Auto Eosinophil # : 0.17 K/uL  Auto Basophil # : 0.05 K/uL  Auto Neutrophil % : 39.6 %  Auto Lymphocyte % : 30.4 %  Auto Monocyte % : 20.9 %  Auto Eosinophil % : 6.7 %  Auto Basophil % : 2.0 %    12-10    138  |  98  |  11  ----------------------------<  104<H>  4.1   |  29  |  0.63    Ca    9.6      10 Dec 2019 16:47  Phos  3.7     12-10  Mg     2.0     12-10    TPro  6.4  /  Alb  3.7  /  TBili  0.2  /  DBili  x   /  AST  17  /  ALT  15  /  AlkPhos  68  12-10      < from: US Duplex Venous Lower Ext Complete, Bilateral (12.10.19 @ 14:44) >  No evidence of deep venous thrombosis in either lower extremity.    Left groin mass measuring 6.1 x 2.7 x 3.3 cm abutting the adjacent common   femoral vein. Findings may reflect groin hematoma. Lymphadenopathy is   also a consideration.    < end of copied text >NAC/ NC/AT; PERRLA; EOMI  supple; MMM  A and O x 3  CTA b/l; [facility]  Nl S1 S2 RR; no M  Ab soft; on HSM; no guarding  no CC; + edema; + subcutaneous mass c/w plasmacytoma  neuro exam non-focal  warm and dry    LABS                        10.0   2.53  )-----------( 141      ( 10 Dec 2019 16:47 )             32.4     CBC Full  -  ( 10 Dec 2019 16:47 )  WBC Count : 2.53 K/uL  RBC Count : 3.48 M/uL  Hemoglobin : 10.0 g/dL  Hematocrit : 32.4 %  Platelet Count - Automated : 141 K/uL  Mean Cell Volume : 93.1 fL  Mean Cell Hemoglobin : 28.7 pg  Mean Cell Hemoglobin Concentration : 30.9 %  Auto Neutrophil # : 1.00 K/uL  Auto Lymphocyte # : 0.77 K/uL  Auto Monocyte # : 0.53 K/uL  Auto Eosinophil # : 0.17 K/uL  Auto Basophil # : 0.05 K/uL  Auto Neutrophil % : 39.6 %  Auto Lymphocyte % : 30.4 %  Auto Monocyte % : 20.9 %  Auto Eosinophil % : 6.7 %  Auto Basophil % : 2.0 %    12-11    142  |  105  |  9   ----------------------------<  86  4.7   |  26  |  0.65    Ca    9.0      11 Dec 2019 06:15  Phos  3.5     12-11  Mg     2.0     12-11    TPro  5.8<L>  /  Alb  3.1<L>  /  TBili  0.3  /  DBili  x   /  AST  13  /  ALT  11  /  AlkPhos  51  12-11      12-10    138  |  98  |  11  ----------------------------<  104<H>  4.1   |  29  |  0.63    Ca    9.6      10 Dec 2019 16:47  Phos  3.7     12-10  Mg     2.0     12-10    TPro  6.4  /  Alb  3.7  /  TBili  0.2  /  DBili  x   /  AST  17  /  ALT  15  /  AlkPhos  68  12-10      < from: US Duplex Venous Lower Ext Complete, Bilateral (12.10.19 @ 14:44) >  No evidence of deep venous thrombosis in either lower extremity.    Left groin mass measuring 6.1 x 2.7 x 3.3 cm abutting the adjacent common   femoral vein. Findings may reflect groin hematoma. Lymphadenopathy is   also a consideration.    < end of copied text > MED ONC ATT Consult PN    S: Events noted; pt has no complaint. Pt denies SOB or CP. Pain adequately controlled.    Vital Signs Last 24 Hrs  T(C): 36.8 (10 Dec 2019 21:02), Max: 36.9 (10 Dec 2019 11:12)  T(F): 98.2 (10 Dec 2019 21:02), Max: 98.5 (10 Dec 2019 11:12)  HR: 68 (10 Dec 2019 21:02) (68 - 72)  BP: 124/68 (10 Dec 2019 21:02) (118/64 - 124/68)  RR: 19 (10 Dec 2019 21:02) (19 - 20)  SpO2: 96% (10 Dec 2019 21:02) (96% - 99%)    NAC/ NC/AT; PERRLA; EOMI  supple; MMM  A and O x 3  CT[facility] b[facility]l; [facility]  Nl S1 S2 RR; no M  Ab soft; on HSM; no guarding  no CC; + edema; + subcutaneous masses c/w plasmacytoma  neuro exam non-focal  warm and dry    LABS                          10.3   2.02  )-----------( 132      ( 11 Dec 2019 06:15 )             33.5       CBC Full  -  ( 11 Dec 2019 06:15 )  WBC Count : 2.02 K/uL  RBC Count : 3.64 M/uL  Hemoglobin : 10.3 g/dL  Hematocrit : 33.5 %  Platelet Count - Automated : 132 K/uL  Mean Cell Volume : 92.0 fL  Mean Cell Hemoglobin : 28.3 pg  Mean Cell Hemoglobin Concentration : 30.7 %  Auto Neutrophil # : 0.64 K/uL  Auto Lymphocyte # : 0.68 K/uL  Auto Monocyte # : 0.44 K/uL  Auto Eosinophil # : 0.22 K/uL  Auto Basophil # : 0.04 K/uL  Auto Neutrophil % : 31.6 %  Auto Lymphocyte % : 33.7 %  Auto Monocyte % : 21.8 %  Auto Eosinophil % : 10.9 %  Auto Basophil % : 2.0 %                 10.0   2.53  )-----------( 141      ( 10 Dec 2019 16:47 )             32.4     CBC Full  -  ( 10 Dec 2019 16:47 )  WBC Count : 2.53 K/uL  RBC Count : 3.48 M/uL  Hemoglobin : 10.0 g/dL  Hematocrit : 32.4 %  Platelet Count - Automated : 141 K/uL  Mean Cell Volume : 93.1 fL  Mean Cell Hemoglobin : 28.7 pg  Mean Cell Hemoglobin Concentration : 30.9 %  Auto Neutrophil # : 1.00 K/uL  Auto Lymphocyte # : 0.77 K/uL  Auto Monocyte # : 0.53 K/uL  Auto Eosinophil # : 0.17 K/uL  Auto Basophil # : 0.05 K/uL  Auto Neutrophil % : 39.6 %  Auto Lymphocyte % : 30.4 %  Auto Monocyte % : 20.9 %  Auto Eosinophil % : 6.7 %  Auto Basophil % : 2.0 %    12-10    138  |  98  |  11  ----------------------------<  104<H>  4.1   |  29  |  0.63    Ca    9.6      10 Dec 2019 16:47  Phos  3.7     12-10  Mg     2.0     12-10    TPro  6.4  /  Alb  3.7  /  TBili  0.2  /  DBili  x   /  AST  17  /  ALT  15  /  AlkPhos  68  12-10      < from: US Duplex Venous Lower Ext Complete, Bilateral (12.10.19 @ 14:44) >  No evidence of deep venous thrombosis in either lower extremity.    Left groin mass measuring 6.1 x 2.7 x 3.3 cm abutting the adjacent common   femoral vein. Findings may reflect groin hematoma. Lymphadenopathy is   also a consideration.    < end of copied text >NAC/ NC/AT; PERRLA; EOMI  supple; MMM  A and O x 3  CTA b/l; [facility]  Nl S1 S2 RR; no M  Ab soft; on HSM; no guarding  no CC; + edema; + subcutaneous mass c/w plasmacytoma  neuro exam non-focal  warm and dry    LABS                        10.0   2.53  )-----------( 141      ( 10 Dec 2019 16:47 )             32.4     CBC Full  -  ( 10 Dec 2019 16:47 )  WBC Count : 2.53 K/uL  RBC Count : 3.48 M/uL  Hemoglobin : 10.0 g/dL  Hematocrit : 32.4 %  Platelet Count - Automated : 141 K/uL  Mean Cell Volume : 93.1 fL  Mean Cell Hemoglobin : 28.7 pg  Mean Cell Hemoglobin Concentration : 30.9 %  Auto Neutrophil # : 1.00 K/uL  Auto Lymphocyte # : 0.77 K/uL  Auto Monocyte # : 0.53 K/uL  Auto Eosinophil # : 0.17 K/uL  Auto Basophil # : 0.05 K/uL  Auto Neutrophil % : 39.6 %  Auto Lymphocyte % : 30.4 %  Auto Monocyte % : 20.9 %  Auto Eosinophil % : 6.7 %  Auto Basophil % : 2.0 %    12-11    142  |  105  |  9   ----------------------------<  86  4.7   |  26  |  0.65    Ca    9.0      11 Dec 2019 06:15  Phos  3.5     12-11  Mg     2.0     12-11    TPro  5.8<L>  /  Alb  3.1<L>  /  TBili  0.3  /  DBili  x   /  AST  13  /  ALT  11  /  AlkPhos  51  12-11      12-10    138  |  98  |  11  ----------------------------<  104<H>  4.1   |  29  |  0.63    Ca    9.6      10 Dec 2019 16:47  Phos  3.7     12-10  Mg     2.0     12-10    TPro  6.4  /  Alb  3.7  /  TBili  0.2  /  DBili  x   /  AST  17  /  ALT  15  /  AlkPhos  68  12-10      < from: US Duplex Venous Lower Ext Complete, Bilateral (12.10.19 @ 14:44) >  No evidence of deep venous thrombosis in either lower extremity.    Left groin mass measuring 6.1 x 2.7 x 3.3 cm abutting the adjacent common   femoral vein. Findings may reflect groin hematoma. Lymphadenopathy is   also a consideration.    < end of copied text >    < from: Xray Chest 1 View- PORTABLE-Routine (12.10.19 @ 16:54) >  FINDINGS/  IMPRESSION:    Bibasilar linear atelectasis otherwise clear lungs. No pleural effusion   or pneumothorax.    The cardiomediastinal silhouette is normal in size and contour. Tip of   the right Mediport catheter within SVC.    < end of copied text >

## 2019-12-11 NOTE — PROGRESS NOTE ADULT - ASSESSMENT
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 now to start dexamethasone (20 mg po once daily), cyclophasphomide (250 mg/meter sq x 4 days CIVI), etoposide (25 mg/meter sq x 4 days CIVI) and cisplatin (7.5 mg meter sq x 4 days CIVI) today 12/11/19 till 12/14/19  -I discussed goals of care in detail with patient; risks and benefits of the chemotherapy regimen discussed in detail and patient understands and informed consent obtained and placed in chart  -continue with acyclovir 400 mg once daily  -add levofloxacin 500 mg once daily and diflucan 200 mg once daily for prophylaxis  -will arrange for neulasta to be done in the oncology office on Monday 12/16/19  -check daily chemistry, Mg, CBC and LFTs  -start normal saline prehydration over night  -obtain myeloma panel including QIGG and kappa/lambda ratio  -continue with pain meds    PROBLEM #2 Neutropenia - likely from MM disease; leukopenia mildly worse today  -neutropenic precaution  -no need for GMCSF at this time but will plan for neulasta on 12/16/19 in the oncology office  -OI prophylaxis started with levofloxacin and diflucan    PROBLEM #3 Anemia - likely from MM disease  -awaiting iron study, retic, B12/folate  -no need for PRBC unless Hg < 7 g/dL or if pt is symptomatic    PROBLEM #4 DVT of LLE - repeat doppler is negative  -continue with eliquis at current dose    MOLST form from the outpatient clinica available; OOB prn; tentative DC to home after completion of DCEP; chemo orders reviewed with chemotherapy nurse

## 2019-12-11 NOTE — CHART NOTE - NSCHARTNOTEFT_GEN_A_CORE
Advanced care note  patient seen with NP  79 year old woman with IgG kappa plasma cell neoplasm s/p multiple lines of chemotherapy with aggressive progression admitted to start DCEP   patient is Ox3  son Jose is HCP  Patient notes she is DNR- .

## 2019-12-11 NOTE — PROGRESS NOTE ADULT - PROBLEM SELECTOR PLAN 1
Diagnosed in 2010  s/p multiple lines of therapy including chemo and radiation  c/b multiple fractures and cancer related pain  Chemo planned by   Dr. Meade  Plan for 4 days of Cisplatin, Etoposide, Cyclophosphomide, and Dex  Will monitor TLS labs daily Diagnosed in 2010  s/p multiple lines of therapy including chemo and radiation  c/b multiple fractures and cancer related pain  Chemo planned by   Dr. Meade  Plan for 4 days of Cisplatin, Etoposide, Cyclophosphomide, and Dex  Will monitor TLS labs daily.  Will added Ua level- ? role for colchicine?

## 2019-12-11 NOTE — PROGRESS NOTE ADULT - ATTENDING COMMENTS
MM for chemo  L groin mass 6 cm- hematoma ????- Observe MM for chemo  L groin mass 6 cm- hematoma ????- Observe  monitor TLS labs daily    GOC discussion.- see note

## 2019-12-11 NOTE — PROGRESS NOTE ADULT - SUBJECTIVE AND OBJECTIVE BOX
Patient is a 79y old  Female who presents with a chief complaint of Inpatient chemo (11 Dec 2019 10:06)      SUBJECTIVE / OVERNIGHT EVENTS:  comfortable- to get chemo with Dr Ham renteria    MEDICATIONS  (STANDING):  acyclovir   Oral Tab/Cap 400 milliGRAM(s) Oral two times a day  apixaban 5 milliGRAM(s) Oral two times a day  chlorhexidine 0.12% Liquid 15 milliLiter(s) Oral Mucosa two times a day  cyclophosphamide IVPB w/additives (eMAR) 400 milliGRAM(s) IV Intermittent daily  dexAMETHasone     Tablet 20 milliGRAM(s) Oral daily  escitalopram 20 milliGRAM(s) Oral daily  fentaNYL   Patch  25 MICROgram(s)/Hr 1 Patch Transdermal every 72 hours  fentaNYL   Patch 100 MICROgram(s)/Hr. 1 Patch Transdermal every 48 hours  fluconAZOLE   Tablet 200 milliGRAM(s) Oral daily  levoFLOXacin  Tablet 500 milliGRAM(s) Oral every 24 hours  ondansetron Injectable 8 milliGRAM(s) IV Push daily  sodium chloride 0.9%. 1000 milliLiter(s) (75 mL/Hr) IV Continuous <Continuous>    MEDICATIONS  (PRN):  acetaminophen   Tablet .. 650 milliGRAM(s) Oral every 6 hours PRN Temp greater or equal to 38C (100.4F)  bisacodyl 5 milliGRAM(s) Oral every 12 hours PRN Constipation  diphenhydrAMINE 25 milliGRAM(s) Oral every 6 hours PRN PRURITIS  oxyCODONE    IR 20 milliGRAM(s) Oral every 4 hours PRN Severe Pain (7 - 10)  prochlorperazine   Tablet 10 milliGRAM(s) Oral every 6 hours PRN NAUSEA      I&O's Summary    10 Dec 2019 07:01  -  11 Dec 2019 07:00  --------------------------------------------------------  IN: 0 mL / OUT: 250 mL / NET: -250 mL    Vital Signs Last 24 Hrs  T(C): 36.6 (11 Dec 2019 06:30), Max: 36.8 (10 Dec 2019 21:02)  T(F): 97.9 (11 Dec 2019 06:30), Max: 98.2 (10 Dec 2019 21:02)  HR: 65 (11 Dec 2019 06:30) (65 - 68)  BP: 116/62 (11 Dec 2019 06:30) (116/62 - 124/68)  BP(mean): --  RR: 18 (11 Dec 2019 06:30) (18 - 19)  SpO2: 97% (11 Dec 2019 06:30) (96% - 97%)    PHYSICAL EXAM:  GENERAL: NAD, well-developed  HEAD:  Atraumatic, Normocephalic  EYES: EOMI, PERRLA, conjunctiva and sclera clear  NECK: Supple, No JVD  CHEST/LUNG: Clear to auscultation bilaterally; No wheeze  r chest port  HEART: Regular rate and rhythm; No murmurs, rubs, or gallops  ABDOMEN: Soft, Nontender, Nondistended; Bowel sounds present  EXTREMITIES:  2+ Peripheral Pulses, No clubbing, cyanosis,   L knee mass sec to "MM"  B/l  leg edema L>R 2 to 3 plus  PSYCH: AAOx3  NEUROLOGY: non-focal  SKIN: No rashes or lesions    LABS:                        10.3   2.02  )-----------( 132      ( 11 Dec 2019 06:15 )             33.5     12-11    142  |  105  |  9   ----------------------------<  86  4.7   |  26  |  0.65    Ca    9.0      11 Dec 2019 06:15  Phos  3.5     12-11  Mg     2.0     12-11    TPro  5.8<L>  /  Alb  3.1<L>  /  TBili  0.3  /  DBili  x   /  AST  13  /  ALT  11  /  AlkPhos  51  12-11    PT/INR - ( 10 Dec 2019 16:47 )   PT: 13.3 SEC;   INR: 1.16          PTT - ( 10 Dec 2019 16:47 )  PTT:32.5 SEC      Urinalysis Basic - ( 10 Dec 2019 12:21 )    Color: LIGHT YELLOW / Appearance: CLEAR / S.008 / pH: 7.5  Gluc: NEGATIVE / Ketone: NEGATIVE  / Bili: NEGATIVE / Urobili: NORMAL   Blood: NEGATIVE / Protein: NEGATIVE / Nitrite: NEGATIVE   Leuk Esterase: NEGATIVE / RBC: x / WBC x   Sq Epi: x / Non Sq Epi: x / Bacteria: x        RADIOLOGY & ADDITIONAL TESTS:  < from: Xray Chest 1 View- PORTABLE-Routine (12.10.19 @ 16:54) >  IMPRESSION:  Bibasilar linear atelectasis otherwise clear lungs. No pleural effusion   or pneumothorax.  The cardiomediastinal silhouette is normal in size and contour. Tip of   the right Mediport catheter within SVC.    < from: US Duplex Venous Lower Ext Complete, Bilateral (12.10.19 @ 14:44) >  IMPRESSION:   No evidence of deep venous thrombosis in either lower extremity.  Left groin mass measuring 6.1 x 2.7 x 3.3 cm abutting the adjacent common   femoral vein. Findings may reflect groin hematoma. Lymphadenopathy is   also a consideration.            Care Discussed with Consultants/Other Providers: Patient is a 79y old  Female who presents with a chief complaint of Inpatient chemo (11 Dec 2019 10:06)      SUBJECTIVE / OVERNIGHT EVENTS:  comfortable- to get chemo with Dr Meade today    MEDICATIONS  (STANDING):  acyclovir   Oral Tab/Cap 400 milliGRAM(s) Oral two times a day  apixaban 5 milliGRAM(s) Oral two times a day  chlorhexidine 0.12% Liquid 15 milliLiter(s) Oral Mucosa two times a day  cyclophosphamide IVPB w/additives (eMAR) 400 milliGRAM(s) IV Intermittent daily  dexAMETHasone     Tablet 20 milliGRAM(s) Oral daily  escitalopram 20 milliGRAM(s) Oral daily  fentaNYL   Patch  25 MICROgram(s)/Hr 1 Patch Transdermal every 72 hours  fentaNYL   Patch 100 MICROgram(s)/Hr. 1 Patch Transdermal every 48 hours  fluconAZOLE   Tablet 200 milliGRAM(s) Oral daily  levoFLOXacin  Tablet 500 milliGRAM(s) Oral every 24 hours  ondansetron Injectable 8 milliGRAM(s) IV Push daily  sodium chloride 0.9%. 1000 milliLiter(s) (75 mL/Hr) IV Continuous <Continuous>    MEDICATIONS  (PRN):  acetaminophen   Tablet .. 650 milliGRAM(s) Oral every 6 hours PRN Temp greater or equal to 38C (100.4F)  bisacodyl 5 milliGRAM(s) Oral every 12 hours PRN Constipation  diphenhydrAMINE 25 milliGRAM(s) Oral every 6 hours PRN PRURITIS  oxyCODONE    IR 20 milliGRAM(s) Oral every 4 hours PRN Severe Pain (7 - 10)  prochlorperazine   Tablet 10 milliGRAM(s) Oral every 6 hours PRN NAUSEA      I&O's Summary    10 Dec 2019 07:01  -  11 Dec 2019 07:00  --------------------------------------------------------  IN: 0 mL / OUT: 250 mL / NET: -250 mL    Vital Signs Last 24 Hrs  T(C): 36.6 (11 Dec 2019 06:30), Max: 36.8 (10 Dec 2019 21:02)  T(F): 97.9 (11 Dec 2019 06:30), Max: 98.2 (10 Dec 2019 21:02)  HR: 65 (11 Dec 2019 06:30) (65 - 68)  BP: 116/62 (11 Dec 2019 06:30) (116/62 - 124/68)  BP(mean): --  RR: 18 (11 Dec 2019 06:30) (18 - 19)  SpO2: 97% (11 Dec 2019 06:30) (96% - 97%)    PHYSICAL EXAM:  GENERAL: NAD, well-developed  HEAD:  Atraumatic, Normocephalic  EYES: EOMI, PERRLA, conjunctiva and sclera clear  NECK: Supple, No JVD  CHEST/LUNG: Clear to auscultation bilaterally; No wheeze  r chest port  HEART: Regular rate and rhythm; No murmurs, rubs, or gallops  ABDOMEN: Soft, Nontender, Nondistended; Bowel sounds present  EXTREMITIES:  2+ Peripheral Pulses, No clubbing, cyanosis,   L knee mass sec to "MM"  B/l  leg edema L>R 2 to 3 plus  PSYCH: AAOx3  NEUROLOGY: non-focal  SKIN: No rashes or lesions    LABS:                        10.3   2.02  )-----------( 132      ( 11 Dec 2019 06:15 )             33.5     12-11    142  |  105  |  9   ----------------------------<  86  4.7   |  26  |  0.65    Ca    9.0      11 Dec 2019 06:15  Phos  3.5     12-11  Mg     2.0     12-11    TPro  5.8<L>  /  Alb  3.1<L>  /  TBili  0.3  /  DBili  x   /  AST  13  /  ALT  11  /  AlkPhos  51  12-11    PT/INR - ( 10 Dec 2019 16:47 )   PT: 13.3 SEC;   INR: 1.16          PTT - ( 10 Dec 2019 16:47 )  PTT:32.5 SEC      Urinalysis Basic - ( 10 Dec 2019 12:21 )    Color: LIGHT YELLOW / Appearance: CLEAR / S.008 / pH: 7.5  Gluc: NEGATIVE / Ketone: NEGATIVE  / Bili: NEGATIVE / Urobili: NORMAL   Blood: NEGATIVE / Protein: NEGATIVE / Nitrite: NEGATIVE   Leuk Esterase: NEGATIVE / RBC: x / WBC x   Sq Epi: x / Non Sq Epi: x / Bacteria: x        RADIOLOGY & ADDITIONAL TESTS:  < from: Xray Chest 1 View- PORTABLE-Routine (12.10.19 @ 16:54) >  IMPRESSION:  Bibasilar linear atelectasis otherwise clear lungs. No pleural effusion   or pneumothorax.  The cardiomediastinal silhouette is normal in size and contour. Tip of   the right Mediport catheter within SVC.    < from: US Duplex Venous Lower Ext Complete, Bilateral (12.10.19 @ 14:44) >  IMPRESSION:   No evidence of deep venous thrombosis in either lower extremity.  Left groin mass measuring 6.1 x 2.7 x 3.3 cm abutting the adjacent common   femoral vein. Findings may reflect groin hematoma. Lymphadenopathy is   also a consideration.            Care Discussed with Consultants/Other Providers:

## 2019-12-12 LAB
ALBUMIN SERPL ELPH-MCNC: 3.2 G/DL — LOW (ref 3.3–5)
ALP SERPL-CCNC: 48 U/L — SIGNIFICANT CHANGE UP (ref 40–120)
ALT FLD-CCNC: 12 U/L — SIGNIFICANT CHANGE UP (ref 4–33)
ANION GAP SERPL CALC-SCNC: 11 MMO/L — SIGNIFICANT CHANGE UP (ref 7–14)
AST SERPL-CCNC: 13 U/L — SIGNIFICANT CHANGE UP (ref 4–32)
BASOPHILS # BLD AUTO: 0.01 K/UL — SIGNIFICANT CHANGE UP (ref 0–0.2)
BASOPHILS NFR BLD AUTO: 0.4 % — SIGNIFICANT CHANGE UP (ref 0–2)
BILIRUB SERPL-MCNC: 0.2 MG/DL — SIGNIFICANT CHANGE UP (ref 0.2–1.2)
BUN SERPL-MCNC: 11 MG/DL — SIGNIFICANT CHANGE UP (ref 7–23)
CALCIUM SERPL-MCNC: 9 MG/DL — SIGNIFICANT CHANGE UP (ref 8.4–10.5)
CHLORIDE SERPL-SCNC: 105 MMOL/L — SIGNIFICANT CHANGE UP (ref 98–107)
CO2 SERPL-SCNC: 22 MMOL/L — SIGNIFICANT CHANGE UP (ref 22–31)
CREAT SERPL-MCNC: 0.56 MG/DL — SIGNIFICANT CHANGE UP (ref 0.5–1.3)
EOSINOPHIL # BLD AUTO: 0 K/UL — SIGNIFICANT CHANGE UP (ref 0–0.5)
EOSINOPHIL NFR BLD AUTO: 0 % — SIGNIFICANT CHANGE UP (ref 0–6)
GLUCOSE SERPL-MCNC: 102 MG/DL — HIGH (ref 70–99)
HCT VFR BLD CALC: 32.5 % — LOW (ref 34.5–45)
HGB BLD-MCNC: 9.9 G/DL — LOW (ref 11.5–15.5)
IGA FLD-MCNC: 569 MG/DL — HIGH (ref 70–400)
IGG FLD-MCNC: 376 MG/DL — LOW (ref 700–1600)
IGM SERPL-MCNC: 14 MG/DL — LOW (ref 40–230)
IMM GRANULOCYTES NFR BLD AUTO: 0.9 % — SIGNIFICANT CHANGE UP (ref 0–1.5)
KAPPA/LAMBDA FREE LIGHT CHAIN RATIO, SERUM: SIGNIFICANT CHANGE UP
LDH SERPL L TO P-CCNC: 170 U/L — SIGNIFICANT CHANGE UP (ref 135–225)
LYMPHOCYTES # BLD AUTO: 0.64 K/UL — LOW (ref 1–3.3)
LYMPHOCYTES # BLD AUTO: 28.1 % — SIGNIFICANT CHANGE UP (ref 13–44)
MAGNESIUM SERPL-MCNC: 2 MG/DL — SIGNIFICANT CHANGE UP (ref 1.6–2.6)
MANUAL SMEAR VERIFICATION: SIGNIFICANT CHANGE UP
MCHC RBC-ENTMCNC: 28.8 PG — SIGNIFICANT CHANGE UP (ref 27–34)
MCHC RBC-ENTMCNC: 30.5 % — LOW (ref 32–36)
MCV RBC AUTO: 94.5 FL — SIGNIFICANT CHANGE UP (ref 80–100)
MONOCYTES # BLD AUTO: 0.51 K/UL — SIGNIFICANT CHANGE UP (ref 0–0.9)
MONOCYTES NFR BLD AUTO: 22.4 % — HIGH (ref 2–14)
NEUTROPHILS # BLD AUTO: 1.1 K/UL — LOW (ref 1.8–7.4)
NEUTROPHILS NFR BLD AUTO: 48.2 % — SIGNIFICANT CHANGE UP (ref 43–77)
NRBC # FLD: 0 K/UL — SIGNIFICANT CHANGE UP (ref 0–0)
PHOSPHATE SERPL-MCNC: 2.8 MG/DL — SIGNIFICANT CHANGE UP (ref 2.5–4.5)
PLATELET # BLD AUTO: 151 K/UL — SIGNIFICANT CHANGE UP (ref 150–400)
PMV BLD: 9.6 FL — SIGNIFICANT CHANGE UP (ref 7–13)
POTASSIUM SERPL-MCNC: 4.3 MMOL/L — SIGNIFICANT CHANGE UP (ref 3.5–5.3)
POTASSIUM SERPL-SCNC: 4.3 MMOL/L — SIGNIFICANT CHANGE UP (ref 3.5–5.3)
PROT SERPL-MCNC: 5.9 G/DL — LOW (ref 6–8.3)
RBC # BLD: 3.44 M/UL — LOW (ref 3.8–5.2)
RBC # FLD: 15.7 % — HIGH (ref 10.3–14.5)
RETICS #: 51 K/UL — SIGNIFICANT CHANGE UP (ref 25–125)
RETICS/RBC NFR: 1.5 % — SIGNIFICANT CHANGE UP (ref 0.5–2.5)
SODIUM SERPL-SCNC: 138 MMOL/L — SIGNIFICANT CHANGE UP (ref 135–145)
URATE SERPL-MCNC: 3.8 MG/DL — SIGNIFICANT CHANGE UP (ref 2.5–7)
WBC # BLD: 2.28 K/UL — LOW (ref 3.8–10.5)
WBC # FLD AUTO: 2.28 K/UL — LOW (ref 3.8–10.5)

## 2019-12-12 PROCEDURE — 99233 SBSQ HOSP IP/OBS HIGH 50: CPT

## 2019-12-12 PROCEDURE — 93010 ELECTROCARDIOGRAM REPORT: CPT

## 2019-12-12 RX ORDER — ONDANSETRON 8 MG/1
8 TABLET, FILM COATED ORAL ONCE
Refills: 0 | Status: COMPLETED | OUTPATIENT
Start: 2019-12-12 | End: 2019-12-12

## 2019-12-12 RX ORDER — FENTANYL CITRATE 50 UG/ML
1 INJECTION INTRAVENOUS
Refills: 0 | Status: DISCONTINUED | OUTPATIENT
Start: 2019-12-12 | End: 2019-12-16

## 2019-12-12 RX ORDER — OXYCODONE HYDROCHLORIDE 5 MG/1
20 TABLET ORAL EVERY 4 HOURS
Refills: 0 | Status: DISCONTINUED | OUTPATIENT
Start: 2019-12-12 | End: 2019-12-16

## 2019-12-12 RX ORDER — DEXAMETHASONE 0.5 MG/5ML
20 ELIXIR ORAL DAILY
Refills: 0 | Status: COMPLETED | OUTPATIENT
Start: 2019-12-13 | End: 2019-12-14

## 2019-12-12 RX ORDER — ONDANSETRON 8 MG/1
16 TABLET, FILM COATED ORAL DAILY
Refills: 0 | Status: DISCONTINUED | OUTPATIENT
Start: 2019-12-13 | End: 2019-12-13

## 2019-12-12 RX ORDER — POLYETHYLENE GLYCOL 3350 17 G/17G
17 POWDER, FOR SOLUTION ORAL DAILY
Refills: 0 | Status: DISCONTINUED | OUTPATIENT
Start: 2019-12-12 | End: 2019-12-16

## 2019-12-12 RX ORDER — METOCLOPRAMIDE HCL 10 MG
10 TABLET ORAL ONCE
Refills: 0 | Status: COMPLETED | OUTPATIENT
Start: 2019-12-12 | End: 2019-12-12

## 2019-12-12 RX ORDER — ONDANSETRON 8 MG/1
4 TABLET, FILM COATED ORAL EVERY 12 HOURS
Refills: 0 | Status: DISCONTINUED | OUTPATIENT
Start: 2019-12-12 | End: 2019-12-16

## 2019-12-12 RX ADMIN — Medication 400 MILLIGRAM(S): at 09:42

## 2019-12-12 RX ADMIN — SODIUM CHLORIDE 75 MILLILITER(S): 9 INJECTION INTRAMUSCULAR; INTRAVENOUS; SUBCUTANEOUS at 09:41

## 2019-12-12 RX ADMIN — FENTANYL CITRATE 1 PATCH: 50 INJECTION INTRAVENOUS at 07:22

## 2019-12-12 RX ADMIN — CHLORHEXIDINE GLUCONATE 1 APPLICATION(S): 213 SOLUTION TOPICAL at 11:56

## 2019-12-12 RX ADMIN — APIXABAN 5 MILLIGRAM(S): 2.5 TABLET, FILM COATED ORAL at 09:42

## 2019-12-12 RX ADMIN — Medication 41.67 MILLIGRAM(S): at 14:10

## 2019-12-12 RX ADMIN — ONDANSETRON 8 MILLIGRAM(S): 8 TABLET, FILM COATED ORAL at 12:01

## 2019-12-12 RX ADMIN — FENTANYL CITRATE 1 PATCH: 50 INJECTION INTRAVENOUS at 18:47

## 2019-12-12 RX ADMIN — Medication 0.5 MILLIGRAM(S): at 16:45

## 2019-12-12 RX ADMIN — ESCITALOPRAM OXALATE 20 MILLIGRAM(S): 10 TABLET, FILM COATED ORAL at 09:42

## 2019-12-12 RX ADMIN — FENTANYL CITRATE 1 PATCH: 50 INJECTION INTRAVENOUS at 18:44

## 2019-12-12 RX ADMIN — OXYCODONE HYDROCHLORIDE 20 MILLIGRAM(S): 5 TABLET ORAL at 10:15

## 2019-12-12 RX ADMIN — OXYCODONE HYDROCHLORIDE 20 MILLIGRAM(S): 5 TABLET ORAL at 09:40

## 2019-12-12 RX ADMIN — Medication 20 MILLIGRAM(S): at 11:56

## 2019-12-12 RX ADMIN — OXYCODONE HYDROCHLORIDE 20 MILLIGRAM(S): 5 TABLET ORAL at 09:38

## 2019-12-12 RX ADMIN — Medication 10 MILLIGRAM(S): at 13:40

## 2019-12-12 RX ADMIN — FLUCONAZOLE 200 MILLIGRAM(S): 150 TABLET ORAL at 11:57

## 2019-12-12 RX ADMIN — CHLORHEXIDINE GLUCONATE 15 MILLILITER(S): 213 SOLUTION TOPICAL at 06:22

## 2019-12-12 NOTE — PROGRESS NOTE ADULT - PROBLEM SELECTOR PLAN 4
Zofran PRN Patient is getting chemo- c/o n/v- no blood - mostly dry heaves- will do iv zofran q8 and prn

## 2019-12-12 NOTE — PROGRESS NOTE ADULT - PROBLEM SELECTOR PLAN 1
Diagnosed in 2010  s/p multiple lines of therapy including chemo and radiation  c/b multiple fractures and cancer related pain  Chemo planned by   Dr. Meade  Getting day #2 of  4 days of Cisplatin, Etoposide, Cyclophosphomide, and Dex  Will monitor TLS labs daily.  Will added Ua level- ? role for colchicine?

## 2019-12-12 NOTE — PROGRESS NOTE ADULT - SUBJECTIVE AND OBJECTIVE BOX
Patient is a 79y old  Female who presents with a chief complaint of Inpatient chemo (12 Dec 2019 10:48)      SUBJECTIVE / OVERNIGHT EVENTS:  Patient is getting chemo- c/o n/v- no blood - mostly dry heaves- will do iv zofran q8 and prn    MEDICATIONS  (STANDING):  acyclovir   Oral Tab/Cap 400 milliGRAM(s) Oral two times a day  apixaban 5 milliGRAM(s) Oral two times a day  chlorhexidine 0.12% Liquid 15 milliLiter(s) Oral Mucosa two times a day  chlorhexidine 4% Liquid 1 Application(s) Topical <User Schedule>  cyclophosphamide IVPB w/additives (eMAR) 400 milliGRAM(s) IV Intermittent daily  dexAMETHasone     Tablet 20 milliGRAM(s) Oral daily  escitalopram 20 milliGRAM(s) Oral daily  fentaNYL   Patch  25 MICROgram(s)/Hr 1 Patch Transdermal every 72 hours  fentaNYL   Patch 100 MICROgram(s)/Hr. 1 Patch Transdermal <User Schedule>  fluconAZOLE   Tablet 200 milliGRAM(s) Oral daily  levoFLOXacin  Tablet 500 milliGRAM(s) Oral every 24 hours  multivitamin 1 Tablet(s) Oral daily  ondansetron Injectable 8 milliGRAM(s) IV Push daily  polyethylene glycol 3350 17 Gram(s) Oral daily  sodium chloride 0.9%. 1000 milliLiter(s) (75 mL/Hr) IV Continuous <Continuous>    MEDICATIONS  (PRN):  acetaminophen   Tablet .. 650 milliGRAM(s) Oral every 6 hours PRN Temp greater or equal to 38C (100.4F)  bisacodyl 5 milliGRAM(s) Oral every 12 hours PRN Constipation  diphenhydrAMINE 25 milliGRAM(s) Oral every 6 hours PRN PRURITIS  oxyCODONE    IR 20 milliGRAM(s) Oral every 4 hours PRN Severe Pain (7 - 10)  prochlorperazine   Tablet 10 milliGRAM(s) Oral every 6 hours PRN NAUSEA    Vital Signs Last 24 Hrs  T(C): 36.7 (12 Dec 2019 06:26), Max: 36.7 (12 Dec 2019 06:26)  T(F): 98 (12 Dec 2019 06:26), Max: 98 (12 Dec 2019 06:26)  HR: 65 (12 Dec 2019 12:25) (57 - 65)  BP: 155/85 (12 Dec 2019 12:25) (125/71 - 155/85)  BP(mean): --  RR: 18 (12 Dec 2019 12:25) (17 - 18)  SpO2: 94% (12 Dec 2019 12:25) (94% - 100%)      PHYSICAL EXAM:  GENERAL: NAD, well-developed  HEAD:  Atraumatic, Normocephalic  EYES: EOMI, PERRLA, conjunctiva and sclera clear  NECK: Supple, No JVD  CHEST/LUNG: Clear to auscultation bilaterally; No wheeze  r chest port  HEART: Regular rate and rhythm; No murmurs, rubs, or gallops  ABDOMEN: Soft, Nontender, Nondistended; Bowel sounds present  EXTREMITIES:  2+ Peripheral Pulses, No clubbing, cyanosis,   L knee mass  b/l leg edema  r>l  PSYCH: AAOx3  NEUROLOGY: non-focal  SKIN: No rashes or lesions    LABS:                        9.9    2.28  )-----------( 151      ( 12 Dec 2019 06:30 )             32.5     12-12    138  |  105  |  11  ----------------------------<  102<H>  4.3   |  22  |  0.56    Ca    9.0      12 Dec 2019 06:30  Phos  2.8     12-12  Mg     2.0     12-12    TPro  5.9<L>  /  Alb  3.2<L>  /  TBili  0.2  /  DBili  x   /  AST  13  /  ALT  12  /  AlkPhos  48  12-12    PT/INR - ( 10 Dec 2019 16:47 )   PT: 13.3 SEC;   INR: 1.16          PTT - ( 10 Dec 2019 16:47 )  PTT:32.5 SEC          RADIOLOGY & ADDITIONAL TESTS:    Imaging Personally Reviewed:    Consultant(s) Notes Reviewed:      Care Discussed with Consultants/Other Providers:

## 2019-12-12 NOTE — PROGRESS NOTE ADULT - ASSESSMENT
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 now to start dexamethasone (20 mg po once daily), cyclophasphomide (250 mg/meter sq x 4 days CIVI), etoposide (25 mg/meter sq x 4 days CIVI) and cisplatin (7.5 mg meter sq x 4 days CIVI) today 12/11/19 till 12/14/19  -I discussed goals of care in detail with patient; risks and benefits of the chemotherapy regimen discussed in detail and patient understands and informed consent obtained and placed in chart  -continue with acyclovir 400 mg once daily and levofloxacin 500 mg once daily and diflucan 200 mg once daily for prophylaxis  -will arrange for neulasta to be done in the oncology office on Monday 12/16/19  -check daily chemistry, Mg, CBC and LFTs  -reviewed myeloma panel including QIGG and kappa/lambda ratio  -continue with pain meds    PROBLEM #2 Emesis - G2 and chemotherapy induced; will change dex to IV and increase zofran  -will also add ativan IV 0.5 mg prn    Problem #3 Neutropenia - likely from MM disease; leukopenia mildly worse today  -neutropenic precaution  -no need for GMCSF at this time but will plan for neulasta on 12/16/19 in the oncology office  -OI prophylaxis started with levofloxacin and diflucan    PROBLEM #4 Anemia - likely from MM disease  -reviewed iron study, retic, B12/folate  -no need for PRBC unless Hg < 7 g/dL or if pt is symptomatic    PROBLEM #5 DVT of LLE - repeat doppler is negative  -continue with eliquis at current dose    MOLST form from the outpatient clinica available; OOB prn; tentative DC to home after completion of DCEP; chemo orders reviewed with chemotherapy nurse

## 2019-12-12 NOTE — PROGRESS NOTE ADULT - ASSESSMENT
79 year old lady with multiple myeloma diagnosed in 2010 with disease progression c/b multiple fractures and DVT despite multiple lines of therapy presents for 4 days of planned inpatient chemo.  pancytopenia with neutropenia anc 640   L groin mass- ? Hematoma- consider pelvic ct to asses  Pancyptopenia improving    Chest 1 View- PORTABLE-Routine (12.10.19 @ 16:54) >  IMPRESSION:  Bibasilar linear atelectasis otherwise clear lungs. No pleural effusion   or pneumothorax.  The cardiomediastinal silhouette is normal in size and contour. Tip of   the right Mediport catheter within SVC.    < from: US Duplex Venous Lower Ext Complete, Bilateral (12.10.19 @ 14:44) >  IMPRESSION:   No evidence of deep venous thrombosis in either lower extremity.  Left groin mass measuring 6.1 x 2.7 x 3.3 cm abutting the adjacent common   femoral vein. Findings may reflect groin hematoma. Lymphadenopathy is   also a consideration.      no dvt  MM w/o remission  L groin mass 6cm 79 year old lady with multiple myeloma diagnosed in 2010 with disease progression c/b multiple fractures and DVT despite multiple lines of therapy presents for 4 days of planned inpatient chemo.  pancytopenia with neutropenia anc 640   L groin mass- ? Hematoma- consider pelvic ct to asses  Pancyptopenia improving   day 2/ 4 or 5 of chemo    Chest 1 View- PORTABLE-Routine (12.10.19 @ 16:54) >  IMPRESSION:  Bibasilar linear atelectasis otherwise clear lungs. No pleural effusion   or pneumothorax.  The cardiomediastinal silhouette is normal in size and contour. Tip of   the right Mediport catheter within SVC.    < from: US Duplex Venous Lower Ext Complete, Bilateral (12.10.19 @ 14:44) >  IMPRESSION:   No evidence of deep venous thrombosis in either lower extremity.  Left groin mass measuring 6.1 x 2.7 x 3.3 cm abutting the adjacent common   femoral vein. Findings may reflect groin hematoma. Lymphadenopathy is   also a consideration.      no dvt  MM w/o remission  L groin mass 6cm

## 2019-12-12 NOTE — PROGRESS NOTE ADULT - SUBJECTIVE AND OBJECTIVE BOX
MED ONC ATT Consult PN    S: Events noted; pt reports G2 emesis since this AM and G1 constipation. Pt denies SOB or CP. Pain adequately controlled.    Vital Signs Last 24 Hrs  T(C): 36.8 (10 Dec 2019 21:02), Max: 36.9 (10 Dec 2019 11:12)  T(F): 98.2 (10 Dec 2019 21:02), Max: 98.5 (10 Dec 2019 11:12)  HR: 68 (10 Dec 2019 21:02) (68 - 72)  BP: 124/68 (10 Dec 2019 21:02) (118/64 - 124/68)  RR: 19 (10 Dec 2019 21:02) (19 - 20)  SpO2: 96% (10 Dec 2019 21:02) (96% - 99%)    NAC/ NC/AT; PERRLA; EOMI  supple; MMM  A and O x 3  CTA b/l; no W/R/C; mediport site C/D/I  Nl S1 S2 RR; no M  Ab soft; on HSM; no guarding  no CC; + edema; + subcutaneous masses c/w plasmacytoma  neuro exam non-focal  warm and dry    LABS                        9.9    2.28  )-----------( 151      ( 12 Dec 2019 06:30 )             32.5   CBC Full  -  ( 12 Dec 2019 06:30 )  WBC Count : 2.28 K/uL  RBC Count : 3.44 M/uL  Hemoglobin : 9.9 g/dL  Hematocrit : 32.5 %  Platelet Count - Automated : 151 K/uL  Mean Cell Volume : 94.5 fL  Mean Cell Hemoglobin : 28.8 pg  Mean Cell Hemoglobin Concentration : 30.5 %  Auto Neutrophil # : 1.10 K/uL  Auto Lymphocyte # : 0.64 K/uL  Auto Monocyte # : 0.51 K/uL  Auto Eosinophil # : 0.00 K/uL  Auto Basophil # : 0.01 K/uL  Auto Neutrophil % : 48.2 %  Auto Lymphocyte % : 28.1 %  Auto Monocyte % : 22.4 %  Auto Eosinophil % : 0.0 %  Auto Basophil % : 0.4 %                          10.3   2.02  )-----------( 132      ( 11 Dec 2019 06:15 )             33.5                             10.0   2.53  )-----------( 141      ( 10 Dec 2019 16:47 )             32.4       12-12    138  |  105  |  11  ----------------------------<  102<H>  4.3   |  22  |  0.56    Ca    9.0      12 Dec 2019 06:30  Phos  2.8     12-12  Mg     2.0     12-12    TPro  5.9<L>  /  Alb  3.2<L>  /  TBili  0.2  /  DBili  x   /  AST  13  /  ALT  12  /  AlkPhos  48  12-12      12-11    142  |  105  |  9   ----------------------------<  86  4.7   |  26  |  0.65    Ca    9.0      11 Dec 2019 06:15  Phos  3.5     12-11  Mg     2.0     12-11    TPro  5.8<L>  /  Alb  3.1<L>  /  TBili  0.3  /  DBili  x   /  AST  13  /  ALT  11  /  AlkPhos  51  12-11      12-10    138  |  98  |  11  ----------------------------<  104<H>  4.1   |  29  |  0.63    Ca    9.6      10 Dec 2019 16:47  Phos  3.7     12-10  Mg     2.0     12-10    TPro  6.4  /  Alb  3.7  /  TBili  0.2  /  DBili  x   /  AST  17  /  ALT  15  /  AlkPhos  68  12-10      < from: US Duplex Venous Lower Ext Complete, Bilateral (12.10.19 @ 14:44) >  No evidence of deep venous thrombosis in either lower extremity.    Left groin mass measuring 6.1 x 2.7 x 3.3 cm abutting the adjacent common   femoral vein. Findings may reflect groin hematoma. Lymphadenopathy is   also a consideration.    < end of copied text >    < from: Xray Chest 1 View- PORTABLE-Routine (12.10.19 @ 16:54) >  FINDINGS/  IMPRESSION:    Bibasilar linear atelectasis otherwise clear lungs. No pleural effusion   or pneumothorax.    The cardiomediastinal silhouette is normal in size and contour. Tip of   the right Mediport catheter within SVC.    < end of copied text >

## 2019-12-12 NOTE — PROGRESS NOTE ADULT - ATTENDING COMMENTS
Chemo day # 2/4  plan for [facility] on Monday in office  d/c planning Sunday Chemo day # 2/4  Patient is getting chemo- c/o n/v- no blood - mostly dry heaves- will do iv zofran q8 and prn  plan for [facility] on Monday in office  d/c planning Sunday

## 2019-12-13 ENCOUNTER — TRANSCRIPTION ENCOUNTER (OUTPATIENT)
Age: 79
End: 2019-12-13

## 2019-12-13 LAB
ALBUMIN SERPL ELPH-MCNC: 3.7 G/DL — SIGNIFICANT CHANGE UP (ref 3.3–5)
ALP SERPL-CCNC: 51 U/L — SIGNIFICANT CHANGE UP (ref 40–120)
ALT FLD-CCNC: 16 U/L — SIGNIFICANT CHANGE UP (ref 4–33)
ANION GAP SERPL CALC-SCNC: 13 MMO/L — SIGNIFICANT CHANGE UP (ref 7–14)
AST SERPL-CCNC: 18 U/L — SIGNIFICANT CHANGE UP (ref 4–32)
BASOPHILS # BLD AUTO: 0.01 K/UL — SIGNIFICANT CHANGE UP (ref 0–0.2)
BASOPHILS NFR BLD AUTO: 0.4 % — SIGNIFICANT CHANGE UP (ref 0–2)
BILIRUB SERPL-MCNC: 0.3 MG/DL — SIGNIFICANT CHANGE UP (ref 0.2–1.2)
BUN SERPL-MCNC: 12 MG/DL — SIGNIFICANT CHANGE UP (ref 7–23)
CALCIUM SERPL-MCNC: 9.3 MG/DL — SIGNIFICANT CHANGE UP (ref 8.4–10.5)
CHLORIDE SERPL-SCNC: 105 MMOL/L — SIGNIFICANT CHANGE UP (ref 98–107)
CO2 SERPL-SCNC: 23 MMOL/L — SIGNIFICANT CHANGE UP (ref 22–31)
CREAT SERPL-MCNC: 0.57 MG/DL — SIGNIFICANT CHANGE UP (ref 0.5–1.3)
EOSINOPHIL # BLD AUTO: 0.01 K/UL — SIGNIFICANT CHANGE UP (ref 0–0.5)
EOSINOPHIL NFR BLD AUTO: 0.4 % — SIGNIFICANT CHANGE UP (ref 0–6)
GLUCOSE SERPL-MCNC: 107 MG/DL — HIGH (ref 70–99)
HCT VFR BLD CALC: 33.7 % — LOW (ref 34.5–45)
HGB BLD-MCNC: 10.4 G/DL — LOW (ref 11.5–15.5)
IMM GRANULOCYTES NFR BLD AUTO: 0 % — SIGNIFICANT CHANGE UP (ref 0–1.5)
LYMPHOCYTES # BLD AUTO: 0.65 K/UL — LOW (ref 1–3.3)
LYMPHOCYTES # BLD AUTO: 23.4 % — SIGNIFICANT CHANGE UP (ref 13–44)
MAGNESIUM SERPL-MCNC: 1.9 MG/DL — SIGNIFICANT CHANGE UP (ref 1.6–2.6)
MCHC RBC-ENTMCNC: 29.1 PG — SIGNIFICANT CHANGE UP (ref 27–34)
MCHC RBC-ENTMCNC: 30.9 % — LOW (ref 32–36)
MCV RBC AUTO: 94.4 FL — SIGNIFICANT CHANGE UP (ref 80–100)
MONOCYTES # BLD AUTO: 0.63 K/UL — SIGNIFICANT CHANGE UP (ref 0–0.9)
MONOCYTES NFR BLD AUTO: 22.7 % — HIGH (ref 2–14)
NEUTROPHILS # BLD AUTO: 1.48 K/UL — LOW (ref 1.8–7.4)
NEUTROPHILS NFR BLD AUTO: 53.1 % — SIGNIFICANT CHANGE UP (ref 43–77)
NRBC # FLD: 0 K/UL — SIGNIFICANT CHANGE UP (ref 0–0)
PHOSPHATE SERPL-MCNC: 2.5 MG/DL — SIGNIFICANT CHANGE UP (ref 2.5–4.5)
PLATELET # BLD AUTO: 162 K/UL — SIGNIFICANT CHANGE UP (ref 150–400)
PMV BLD: 9.7 FL — SIGNIFICANT CHANGE UP (ref 7–13)
POTASSIUM SERPL-MCNC: 3.9 MMOL/L — SIGNIFICANT CHANGE UP (ref 3.5–5.3)
POTASSIUM SERPL-SCNC: 3.9 MMOL/L — SIGNIFICANT CHANGE UP (ref 3.5–5.3)
PROT SERPL-MCNC: 6.5 G/DL — SIGNIFICANT CHANGE UP (ref 6–8.3)
RBC # BLD: 3.57 M/UL — LOW (ref 3.8–5.2)
RBC # FLD: 15.9 % — HIGH (ref 10.3–14.5)
SODIUM SERPL-SCNC: 141 MMOL/L — SIGNIFICANT CHANGE UP (ref 135–145)
WBC # BLD: 2.78 K/UL — LOW (ref 3.8–10.5)
WBC # FLD AUTO: 2.78 K/UL — LOW (ref 3.8–10.5)

## 2019-12-13 PROCEDURE — 99233 SBSQ HOSP IP/OBS HIGH 50: CPT

## 2019-12-13 RX ORDER — ONDANSETRON 8 MG/1
16 TABLET, FILM COATED ORAL DAILY
Refills: 0 | Status: COMPLETED | OUTPATIENT
Start: 2019-12-14 | End: 2019-12-14

## 2019-12-13 RX ADMIN — FENTANYL CITRATE 1 PATCH: 50 INJECTION INTRAVENOUS at 06:47

## 2019-12-13 RX ADMIN — FENTANYL CITRATE 1 PATCH: 50 INJECTION INTRAVENOUS at 06:46

## 2019-12-13 RX ADMIN — APIXABAN 5 MILLIGRAM(S): 2.5 TABLET, FILM COATED ORAL at 08:39

## 2019-12-13 RX ADMIN — APIXABAN 5 MILLIGRAM(S): 2.5 TABLET, FILM COATED ORAL at 18:28

## 2019-12-13 RX ADMIN — Medication 41.67 MILLIGRAM(S): at 15:48

## 2019-12-13 RX ADMIN — OXYCODONE HYDROCHLORIDE 20 MILLIGRAM(S): 5 TABLET ORAL at 21:49

## 2019-12-13 RX ADMIN — ONDANSETRON 116 MILLIGRAM(S): 8 TABLET, FILM COATED ORAL at 14:31

## 2019-12-13 RX ADMIN — FENTANYL CITRATE 1 PATCH: 50 INJECTION INTRAVENOUS at 18:26

## 2019-12-13 RX ADMIN — OXYCODONE HYDROCHLORIDE 20 MILLIGRAM(S): 5 TABLET ORAL at 20:49

## 2019-12-13 RX ADMIN — ONDANSETRON 4 MILLIGRAM(S): 8 TABLET, FILM COATED ORAL at 20:55

## 2019-12-13 RX ADMIN — Medication 0.5 MILLIGRAM(S): at 08:38

## 2019-12-13 RX ADMIN — Medication 110 MILLIGRAM(S): at 14:31

## 2019-12-13 RX ADMIN — CHLORHEXIDINE GLUCONATE 1 APPLICATION(S): 213 SOLUTION TOPICAL at 09:59

## 2019-12-13 RX ADMIN — ONDANSETRON 4 MILLIGRAM(S): 8 TABLET, FILM COATED ORAL at 04:37

## 2019-12-13 RX ADMIN — ESCITALOPRAM OXALATE 20 MILLIGRAM(S): 10 TABLET, FILM COATED ORAL at 12:48

## 2019-12-13 NOTE — DISCHARGE NOTE PROVIDER - PROVIDER TOKENS
FREE:[LAST:[[facility]],PHONE:[phone]],FAX:[(   )    -]] FREE:[LAST:[Oncology Dr Meade at],FIRST:[on 12/16/2019 at 2 pm],PHONE:[phone]],FAX:[(   )    -],FOLLOWUP:[1-3 days]]

## 2019-12-13 NOTE — PROGRESS NOTE ADULT - SUBJECTIVE AND OBJECTIVE BOX
MED ONC ATT Consult PN    S: Events noted;     Vital Signs Last 24 Hrs  T(C): 36.7 (13 Dec 2019 06:43), Max: 36.7 (13 Dec 2019 06:43)  T(F): 98.1 (13 Dec 2019 06:43), Max: 98.1 (13 Dec 2019 06:43)  HR: 66 (13 Dec 2019 06:43) (61 - 66)  BP: 157/86 (13 Dec 2019 06:43) (146/74 - 157/86)  RR: 16 (13 Dec 2019 06:43) (16 - 18)  SpO2: 98% (13 Dec 2019 06:43) (94% - 98%)    NAC/ NC/AT; PERRLA; EOMI  supple; MMM  A and O x 3  CTA b/l; no W/R/C; [facility] site C/D/I  Nl S1 S2 RR; no M  Ab soft; on HSM; no guarding  no CC; + edema; + subcutaneous masses c/w plasmacytoma  neuro exam non-focal  warm and dry    LABS               CBC   PENDING     ( 13 Dec 2019 06:30 )                               9.9    2.28  )-----------( 151      ( 12 Dec 2019 06:30 )             32.5                           10.3   2.02  )-----------( 132      ( 11 Dec 2019 06:15 )             33.5                             10.0   2.53  )-----------( 141      ( 10 Dec 2019 16:47 )             32.4     12-13    Chemistry PENDING    12-12    138  |  105  |  11  ----------------------------<  102<H>  4.3   |  22  |  0.56    Ca    9.0      12 Dec 2019 06:30  Phos  2.8     12-12  Mg     2.0     12-12    TPro  5.9<L>  /  Alb  3.2<L>  /  TBili  0.2  /  DBili  x   /  AST  13  /  ALT  12  /  AlkPhos  48  12-12 12-11    142  |  105  |  9   ----------------------------<  86  4.7   |  26  |  0.65    Ca    9.0      11 Dec 2019 06:15  Phos  3.5     12-11  Mg     2.0     12-11    TPro  5.8<L>  /  Alb  3.1<L>  /  TBili  0.3  /  DBili  x   /  AST  13  /  ALT  11  /  AlkPhos  51  12-11      12-10    138  |  98  |  11  ----------------------------<  104<H>  4.1   |  29  |  0.63    Ca    9.6      10 Dec 2019 16:47  Phos  3.7     12-10  Mg     2.0     12-10    TPro  6.4  /  Alb  3.7  /  TBili  0.2  /  DBili  x   /  AST  17  /  ALT  15  /  AlkPhos  68  12-10      < from: US Duplex Venous Lower Ext Complete, Bilateral (12.10.19 @ 14:44) >  No evidence of deep venous thrombosis in either lower extremity.    Left groin mass measuring 6.1 x 2.7 x 3.3 cm abutting the adjacent common   femoral vein. Findings may reflect groin hematoma. Lymphadenopathy is   also a consideration.    < end of copied text >    < from: Xray Chest 1 View- PORTABLE-Routine (12.10.19 @ 16:54) >  FINDINGS/  IMPRESSION:    Bibasilar linear atelectasis otherwise clear lungs. No pleural effusion   or pneumothorax.    The cardiomediastinal silhouette is normal in size and contour. Tip of   the right Mediport catheter within SVC.    < end of copied text > MED ONC ATT Consult PN    S: Events noted; pt reports improved emesis but still has G1 dry heaves; pt denies SOB or CP or fever or chills; reports G1 constipation    Vital Signs Last 24 Hrs  T(C): 36.7 (13 Dec 2019 06:43), Max: 36.7 (13 Dec 2019 06:43)  T(F): 98.1 (13 Dec 2019 06:43), Max: 98.1 (13 Dec 2019 06:43)  HR: 66 (13 Dec 2019 06:43) (61 - 66)  BP: 157/86 (13 Dec 2019 06:43) (146/74 - 157/86)  RR: 16 (13 Dec 2019 06:43) (16 - 18)  SpO2: 98% (13 Dec 2019 06:43) (94% - 98%)    NAC/ NC/AT; PERRLA; EOMI  supple; MMM  A and O x 3  CTA b/l; no W/R/C; [facility] site C/D/I  Nl S1 S2 RR; no M  Ab soft; on HSM; no guarding  no CC; + edema; + subcutaneous masses c/w plasmacytoma  neuro exam non-focal  warm and dry    LABS               CBC   PENDING     ( 13 Dec 2019 06:30 )                               9.9    2.28  )-----------( 151      ( 12 Dec 2019 06:30 )             32.5                           10.3   2.02  )-----------( 132      ( 11 Dec 2019 06:15 )             33.5                             10.0   2.53  )-----------( 141      ( 10 Dec 2019 16:47 )             32.4     12-13    Chemistry PENDING    12-12    138  |  105  |  11  ----------------------------<  102<H>  4.3   |  22  |  0.56    Ca    9.0      12 Dec 2019 06:30  Phos  2.8     12-12  Mg     2.0     12-12    TPro  5.9<L>  /  Alb  3.2<L>  /  TBili  0.2  /  DBili  x   /  AST  13  /  ALT  12  /  AlkPhos  48  12-12 12-11    142  |  105  |  9   ----------------------------<  86  4.7   |  26  |  0.65    Ca    9.0      11 Dec 2019 06:15  Phos  3.5     12-11  Mg     2.0     12-11    TPro  5.8<L>  /  Alb  3.1<L>  /  TBili  0.3  /  DBili  x   /  AST  13  /  ALT  11  /  AlkPhos  51  12-11      12-10    138  |  98  |  11  ----------------------------<  104<H>  4.1   |  29  |  0.63    Ca    9.6      10 Dec 2019 16:47  Phos  3.7     12-10  Mg     2.0     12-10    TPro  6.4  /  Alb  3.7  /  TBili  0.2  /  DBili  x   /  AST  17  /  ALT  15  /  AlkPhos  68  12-10      < from: US Duplex Venous Lower Ext Complete, Bilateral (12.10.19 @ 14:44) >  No evidence of deep venous thrombosis in either lower extremity.    Left groin mass measuring 6.1 x 2.7 x 3.3 cm abutting the adjacent common   femoral vein. Findings may reflect groin hematoma. Lymphadenopathy is   also a consideration.    < end of copied text >    < from: Xray Chest 1 View- PORTABLE-Routine (12.10.19 @ 16:54) >  FINDINGS/  IMPRESSION:    Bibasilar linear atelectasis otherwise clear lungs. No pleural effusion   or pneumothorax.    The cardiomediastinal silhouette is normal in size and contour. Tip of   the right Mediport catheter within SVC.    < end of copied text > MED ONC ATT Consult PN    S: Events noted; pt reports improved emesis but still has G1 dry heaves; pt denies SOB or CP or fever or chills; reports G1 constipation    Vital Signs Last 24 Hrs  T(C): 36.7 (13 Dec 2019 06:43), Max: 36.7 (13 Dec 2019 06:43)  T(F): 98.1 (13 Dec 2019 06:43), Max: 98.1 (13 Dec 2019 06:43)  HR: 66 (13 Dec 2019 06:43) (61 - 66)  BP: 157/86 (13 Dec 2019 06:43) (146/74 - 157/86)  RR: 16 (13 Dec 2019 06:43) (16 - 18)  SpO2: 98% (13 Dec 2019 06:43) (94% - 98%)    NAC/ NC/AT; PERRLA; EOMI  supple; MMM  A and O x 3  CTA b/l; no W/R/C; [facility] site C/D/I  Nl S1 S2 RR; no M  Ab soft; on HSM; no guarding  no CC; + edema; + subcutaneous masses c/w plasmacytoma  neuro exam non-focal  warm and dry    LABS                                     10.4   2.78  )-----------( 162      ( 13 Dec 2019 10:16 )             33.7   CBC Full  -  ( 13 Dec 2019 10:16 )  WBC Count : 2.78 K/uL  RBC Count : 3.57 M/uL  Hemoglobin : 10.4 g/dL  Hematocrit : 33.7 %  Platelet Count - Automated : 162 K/uL  Mean Cell Volume : 94.4 fL  Mean Cell Hemoglobin : 29.1 pg  Mean Cell Hemoglobin Concentration : 30.9 %  Auto Neutrophil # : 1.48 K/uL  Auto Lymphocyte # : 0.65 K/uL  Auto Monocyte # : 0.63 K/uL  Auto Eosinophil # : 0.01 K/uL  Auto Basophil # : 0.01 K/uL  Auto Neutrophil % : 53.1 %  Auto Lymphocyte % : 23.4 %  Auto Monocyte % : 22.7 %  Auto Eosinophil % : 0.4 %  Auto Basophil % : 0.4 %                                 9.9    2.28  )-----------( 151      ( 12 Dec 2019 06:30 )             32.5                           10.3   2.02  )-----------( 132      ( 11 Dec 2019 06:15 )             33.5                             10.0   2.53  )-----------( 141      ( 10 Dec 2019 16:47 )             32.4     12-13    141  |  105  |  12  ----------------------------<  107<H>  3.9   |  23  |  0.57    Ca    9.3      13 Dec 2019 10:16  Phos  2.5     12-13  Mg     1.9     12-13    TPro  6.5  /  Alb  3.7  /  TBili  0.3  /  DBili  x   /  AST  18  /  ALT  16  /  AlkPhos  51  12-13 12-12    138  |  105  |  11  ----------------------------<  102<H>  4.3   |  22  |  0.56    Ca    9.0      12 Dec 2019 06:30  Phos  2.8     12-12  Mg     2.0     12-12    TPro  5.9<L>  /  Alb  3.2<L>  /  TBili  0.2  /  DBili  x   /  AST  13  /  ALT  12  /  AlkPhos  48  12-12      12-11    142  |  105  |  9   ----------------------------<  86  4.7   |  26  |  0.65    Ca    9.0      11 Dec 2019 06:15  Phos  3.5     12-11  Mg     2.0     12-11    TPro  5.8<L>  /  Alb  3.1<L>  /  TBili  0.3  /  DBili  x   /  AST  13  /  ALT  11  /  AlkPhos  51  12-11      12-10    138  |  98  |  11  ----------------------------<  104<H>  4.1   |  29  |  0.63    Ca    9.6      10 Dec 2019 16:47  Phos  3.7     12-10  Mg     2.0     12-10    TPro  6.4  /  Alb  3.7  /  TBili  0.2  /  DBili  x   /  AST  17  /  ALT  15  /  AlkPhos  68  12-10      < from: US Duplex Venous Lower Ext Complete, Bilateral (12.10.19 @ 14:44) >  No evidence of deep venous thrombosis in either lower extremity.    Left groin mass measuring 6.1 x 2.7 x 3.3 cm abutting the adjacent common   femoral vein. Findings may reflect groin hematoma. Lymphadenopathy is   also a consideration.    < end of copied text >    < from: Xray Chest 1 View- PORTABLE-Routine (12.10.19 @ 16:54) >  FINDINGS/  IMPRESSION:    Bibasilar linear atelectasis otherwise clear lungs. No pleural effusion   or pneumothorax.    The cardiomediastinal silhouette is normal in size and contour. Tip of   the right Mediport catheter within SVC.    < end of copied text >

## 2019-12-13 NOTE — PROGRESS NOTE ADULT - SUBJECTIVE AND OBJECTIVE BOX
Patient is a 79y old  Female who presents with a chief complaint of Inpatient chemo (13 Dec 2019 10:53)      SUBJECTIVE / OVERNIGHT EVENTS:  Still c/o n/v- no blood- notes she always get this with  chemo    MEDICATIONS  (STANDING):  apixaban 5 milliGRAM(s) Oral two times a day  chlorhexidine 0.12% Liquid 15 milliLiter(s) Oral Mucosa two times a day  chlorhexidine 4% Liquid 1 Application(s) Topical <User Schedule>  cyclophosphamide IVPB w/additives (eMAR) 400 milliGRAM(s) IV Intermittent daily  dexAMETHasone  IVPB 20 milliGRAM(s) IV Intermittent daily  escitalopram 20 milliGRAM(s) Oral daily  fentaNYL   Patch  25 MICROgram(s)/Hr 1 Patch Transdermal every 72 hours  fentaNYL   Patch 100 MICROgram(s)/Hr. 1 Patch Transdermal <User Schedule>  multivitamin 1 Tablet(s) Oral daily  ondansetron  IVPB 16 milliGRAM(s) IV Intermittent daily  polyethylene glycol 3350 17 Gram(s) Oral daily  sodium chloride 0.9%. 1000 milliLiter(s) (75 mL/Hr) IV Continuous <Continuous>    MEDICATIONS  (PRN):  acetaminophen   Tablet .. 650 milliGRAM(s) Oral every 6 hours PRN Temp greater or equal to 38C (100.4F)  bisacodyl 5 milliGRAM(s) Oral every 12 hours PRN Constipation  diphenhydrAMINE 25 milliGRAM(s) Oral every 6 hours PRN PRURITIS  LORazepam   Injectable 0.5 milliGRAM(s) IV Push every 6 hours PRN Nausea and/or Vomiting  ondansetron Injectable 4 milliGRAM(s) IV Push every 12 hours PRN Nausea  oxyCODONE    IR 20 milliGRAM(s) Oral every 4 hours PRN Severe Pain (7 - 10)  prochlorperazine   Tablet 10 milliGRAM(s) Oral every 6 hours PRN NAUSEA      Vital Signs Last 24 Hrs  T(C): 36.7 (13 Dec 2019 06:43), Max: 36.7 (13 Dec 2019 06:43)  T(F): 98.1 (13 Dec 2019 06:43), Max: 98.1 (13 Dec 2019 06:43)  HR: 66 (13 Dec 2019 06:43) (61 - 66)  BP: 157/86 (13 Dec 2019 06:43) (146/74 - 157/86)  BP(mean): --  RR: 16 (13 Dec 2019 06:43) (16 - 17)  SpO2: 98% (13 Dec 2019 06:43) (98% - 98%)      PHYSICAL EXAM:  GENERAL: NAD, well-developed  HEAD:  Atraumatic, Normocephalic  EYES: EOMI, PERRLA, conjunctiva and sclera clear  NECK: Supple, No JVD  CHEST/LUNG: Clear to auscultation bilaterally; No wheeze  R chest port  HEART: Regular rate and rhythm; No murmurs, rubs, or gallops  ABDOMEN: Soft, Nontender, Nondistended; Bowel sounds present  EXTREMITIES:  2+ Peripheral Pulses, No clubbing, cyanosis,   b/l leg edema  L knee mass  PSYCH: AAOx3  NEUROLOGY: non-focal  SKIN: No rashes or lesions    LABS:                        10.4   2.78  )-----------( 162      ( 13 Dec 2019 10:16 )             33.7     12-13    141  |  105  |  12  ----------------------------<  107<H>  3.9   |  23  |  0.57    Ca    9.3      13 Dec 2019 10:16  Phos  2.5     12-13  Mg     1.9     12-13    TPro  6.5  /  Alb  3.7  /  TBili  0.3  /  DBili  x   /  AST  18  /  ALT  16  /  AlkPhos  51  12-13              Care Discussed with Consultants/Other Providers:  Heme- chemo until Sunday- f/u PCP on [facility]

## 2019-12-13 NOTE — CHART NOTE - NSCHARTNOTEFT_GEN_A_CORE
[facility] NIGHT MEDICINE COVERAGE.    Notified by RN patient in middle of her Cyclophosphamide transfusion when family at bedside noticed patient sitting up and started to shake . Chemo transfusion paused, and VS evaluated and found to be normal (see below). Pt seen and examined. Pt reports not feeling well and needing to use bed pan. She admits to HA and lightheadedness. Pt NAD, VSS, well-developed, well-nourished. Pulmonary exam- WNL, CTAB, no wheezes, crackles, rhonchi. Cardiovascular exam- WNL, RRr, +s1 and +s2, no murmurs, gallops, rubs. Abdominal exam- WNL, Soft, NT, ND normoactive BS throughout. As per chart review, patient has been receiving this agent for last 3-4days while inpatient with no reaction or pt complaints.     Vital Signs Last 24 Hrs  T(C): 36.6 (13 Dec 2019 21:23), Max: 36.9 (13 Dec 2019 14:31)  T(F): 97.9 (13 Dec 2019 21:23), Max: 98.4 (13 Dec 2019 14:31)  HR: 73 (13 Dec 2019 21:23) (66 - 73)  BP: 155/79 (13 Dec 2019 21:23) (142/75 - 157/86)  RR: 18 (13 Dec 2019 21:23) (16 - 18)  SpO2: 95% (13 Dec 2019 21:23) (95% - 99%)      As per heme/onc--Dr. Meade is primary oncologist, however Dr. Alfonso Serrano is covering ([phone]). Medical service called and Dr. Serrano answered within 10minutes of page. Discussed patient case over phone, as per Dr. Serrano Cyclophosphamide is known to be associated with lightheadedness/dizziness and is OK to continue chemo treatment. Family notified. Will obtain Orthostatic VS.    - Orthostatic VS  - continue Cyclophosphamide treatment  - Will continue to monitor patient overnight.    RENNY Pisano  Medicine [place]  [phone]

## 2019-12-13 NOTE — PROGRESS NOTE ADULT - ASSESSMENT
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 now to start dexamethasone (20 mg po once daily), cyclophasphomide (250 mg/meter sq x 4 days CIVI), etoposide (25 mg/meter sq x 4 days CIVI) and cisplatin (7.5 mg meter sq x 4 days CIVI) today 12/11/19 till 12/14/19  -I discussed goals of care in detail with patient; risks and benefits of the chemotherapy regimen discussed in detail and patient understands and informed consent obtained and placed in chart  -continue with acyclovir 400 mg once daily and levofloxacin 500 mg once daily and diflucan 200 mg once daily for prophylaxis  -will arrange for neulasta to be done in the oncology office on Monday 12/16/19  -check daily chemistry, Mg, CBC and LFTs  -reviewed myeloma panel including QIGG and kappa/lambda ratio  -continue with pain meds    PROBLEM #2 Emesis - G2 and chemotherapy induced; will change dex to IV and increase zofran  -will also add ativan IV 0.5 mg prn    Problem #3 Neutropenia - likely from MM disease; leukopenia mildly worse today  -neutropenic precaution  -no need for GMCSF at this time but will plan for neulasta on 12/16/19 in the oncology office  -OI prophylaxis started with levofloxacin and diflucan    PROBLEM #4 Anemia - likely from MM disease  -reviewed iron study, retic, B12/folate  -no need for PRBC unless Hg < 7 g/dL or if pt is symptomatic    PROBLEM #5 DVT of LLE - repeat doppler is negative  -continue with eliquis at current dose    MOLST form from the outpatient clinica available; OOB prn; tentative DC to home after completion of DCEP; chemo orders reviewed with chemotherapy nurse 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 now to start dexamethasone (20 mg po once daily), cyclophasphomide (250 mg/meter sq x 4 days CIVI), etoposide (25 mg/meter sq x 4 days CIVI) and cisplatin (7.5 mg meter sq x 4 days CIVI) today 12/11/19 till 12/14/19  -I discussed goals of care in detail with patient; risks and benefits of the chemotherapy regimen discussed in detail and patient understands and informed consent obtained and placed in chart  -will hold oral acyclovir 400 mg once daily, levofloxacin 500 mg once daily and diflucan 200 mg once daily for prophylaxis starting today but will resume post discharge to help alleviate emesis  -will arrange for neulasta to be done in the oncology office on Monday 12/16/19  -check daily chemistry, Mg, CBC and LFTs  -reviewed myeloma panel including QIGG and kappa/lambda ratio  -continue with pain meds    PROBLEM #2 Emesis - chemotherapy induced (and possibly from abx/diflucan prophylaxis) and improved at G1; start dex to IV and continue with increased zofran daily and prn doses  -continue with ativan IV 0.5 mg prn nausea    Problem #3 Neutropenia - likely from MM disease; leukopenia mildly worse today  -neutropenic precaution  -no need for GMCSF at this time but will plan for neulasta on 12/16/19 in the oncology office  -OI prophylaxis started with levofloxacin and diflucan    PROBLEM #4 Anemia - likely from MM disease  -reviewed iron study, retic, B12/folate  -no need for PRBC unless Hg < 7 g/dL or if pt is symptomatic    PROBLEM #5 DVT of LLE - repeat doppler is negative  -continue with eliquis at current dose    MOLST form from the outpatient clinica available; OOB prn; tentative DC to home after completion of DCEP on 12/15; Dr. Alfonso Serrano will be covering for me and see patient daily over the weekend ([phone]); chemo orders modifed on 12/12 and reviewed with chemotherapy nurse 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 now to start dexamethasone (20 mg po once daily), cyclophasphomide (250 mg/meter sq x 4 days CIVI), etoposide (25 mg/meter sq x 4 days CIVI) and cisplatin (7.5 mg meter sq x 4 days CIVI) today 12/11/19 till 12/14/19  -I discussed goals of care in detail with patient; risks and benefits of the chemotherapy regimen discussed in detail and patient understands and informed consent obtained and placed in chart  -will hold oral acyclovir 400 mg once daily, levofloxacin 500 mg once daily and diflucan 200 mg once daily for prophylaxis starting today but will resume post discharge to help alleviate emesis  -will arrange for neulasta to be done in the oncology office on Monday 12/16/19  -check daily chemistry, Mg, CBC and LFTs  -reviewed myeloma panel including QIGG and kappa/lambda ratio  -continue with pain meds    PROBLEM #2 Emesis - chemotherapy induced (and possibly from abx/diflucan prophylaxis) and improved at G1; start dex to IV and continue with increased zofran daily and prn doses  -continue with ativan IV 0.5 mg prn nausea    Problem #3 Neutropenia - likely from MM disease; leukopenia mildly worse today  -neutropenic precaution  -no need for GMCSF at this time but will plan for neulasta on 12/16/19 in the oncology office  -OI prophylaxis started with levofloxacin and diflucan    PROBLEM #4 Anemia - likely from MM disease  -reviewed iron study, retic, B12/folate  -no need for PRBC unless Hg < 7 g/dL or if pt is symptomatic    PROBLEM #5 DVT of LLE - repeat doppler is negative  -continue with eliquis at current dose    MOLST form from the outpatient clinica available; OOB prn; tentative DC to home after completion of DCEP on 12/15; pt has appointment with outpatient oncology office on Monday 12/16. Dr. Alfonso Serrano will be covering for me and see patient daily over the weekend ([phone]); chemo orders modified on 12/12 and reviewed with chemotherapy nurse and pharmacist

## 2019-12-13 NOTE — DISCHARGE NOTE PROVIDER - CARE PROVIDER_API CALL
[facility],   Phone: [phone]  Fax: (   )    -  Follow Up Time: Oncology Dr Meade at, on 12/16/2019 at 2 pm  Phone: [phone]  Fax: (   )    -  Follow Up Time: 1-3 days

## 2019-12-13 NOTE — DISCHARGE NOTE PROVIDER - NSDCCPCAREPLAN_GEN_ALL_CORE_FT
PRINCIPAL DISCHARGE DIAGNOSIS  Diagnosis: Multiple myeloma in relapse  Assessment and Plan of Treatment: Multiple myeloma in relapse- treated with chemotherapy  cyclophosphamide - out patient f/u with Dr Meade today 12/16/2019 for neurlaselvin at [phone]      SECONDARY DISCHARGE DIAGNOSES  Diagnosis: Multiple myeloma in relapse  Assessment and Plan of Treatment: Multiple myeloma in relapse- treated with chemotherapy  cyclophosphamide - out patient f/u with Dr Meade today 12/16/2019 for neurlaselvin at [phone]

## 2019-12-13 NOTE — PROGRESS NOTE ADULT - ASSESSMENT
79 year old lady with multiple myeloma diagnosed in 2010 with disease progression c/b multiple fractures and DVT despite multiple lines of therapy presents for 4 days of planned inpatient chemo.  pancytopenia with neutropenia anc 640   L groin mass- ? Hematoma- consider pelvic ct to asses  Pancyptopenia improving   day 2/ 4 or 5 of chemo    Chest 1 View- PORTABLE-Routine (12.10.19 @ 16:54) >  IMPRESSION:  Bibasilar linear atelectasis otherwise clear lungs. No pleural effusion   or pneumothorax.  The cardiomediastinal silhouette is normal in size and contour. Tip of   the right Mediport catheter within SVC.    < from: US Duplex Venous Lower Ext Complete, Bilateral (12.10.19 @ 14:44) >  IMPRESSION:   No evidence of deep venous thrombosis in either lower extremity.  Left groin mass measuring 6.1 x 2.7 x 3.3 cm abutting the adjacent common   femoral vein. Findings may reflect groin hematoma. Lymphadenopathy is   also a consideration.      no dvt  MM w/o remission  L groin mass 6cm

## 2019-12-13 NOTE — DISCHARGE NOTE PROVIDER - NSDCMRMEDTOKEN_GEN_ALL_CORE_FT
acyclovir 400 mg oral tablet: 1 tab(s) orally 2 times a day  Eliquis 5 mg oral tablet: 1 tab(s) orally 2 times a day  fentaNYL 100 mcg/hr transdermal film, extended release: 1 film(s) transdermal every 72 hours  fentaNYL 25 mcg/hr transdermal film, extended release: 1 film(s) transdermal every 72 hours  Lexapro 20 mg oral tablet: 1 tab(s) orally once a day  oxyCODONE 20 mg oral tablet: 1 tab(s) orally every 6 hours acyclovir 400 mg oral tablet: 1 tab(s) orally 2 times a day  bisacodyl 5 mg oral delayed release tablet: 1 tab(s) orally every 12 hours, As needed, Constipation  Eliquis 5 mg oral tablet: 1 tab(s) orally 2 times a day  fentaNYL 100 mcg/hr transdermal film, extended release: 1 film(s) transdermal every 72 hours  fentaNYL 25 mcg/hr transdermal film, extended release: 1 film(s) transdermal every 72 hours  Lexapro 20 mg oral tablet: 1 tab(s) orally once a day  Multiple Vitamins oral tablet: 1 tab(s) orally once a day  oxyCODONE 20 mg oral tablet: 1 tab(s) orally every 4 hours, As Needed  polyethylene glycol 3350 oral powder for reconstitution: 17 gram(s) orally once a day

## 2019-12-13 NOTE — DISCHARGE NOTE PROVIDER - HOSPITAL COURSE
79 year old lady with multiple myeloma diagnosed in 2010 with disease progression c/b multiple fractures and DVT despite multiple lines of therapy presents for 4 days of planned inpatient chemo. The patient states that she currently feels weaker and that she feels like her disease is progressing. She has had multiple fractures related to her multiple myeloma and a few weeks was diagnosed with a DVT. She does have frequent episodes of nausea and also constipation (likely a result of her home opioids). She denies any fevers, chills, shortness of breath.        Hospital Course;    patient was treated with IV hydration.    she was treated with chemo of ....    She has s/e on nausea / vomiting sec to chemotherapy treated with v zofran and ativan     she was able to be d/c home on Sunday to get Neulasta on Monday with Oncology 79 year old lady with multiple myeloma diagnosed in 2010 with disease progression c/b multiple fractures and DVT despite multiple lines of therapy presents for 4 days of planned inpatient chemo. The patient states that she currently feels weaker and that she feels like her disease is progressing. She has had multiple fractures related to her multiple myeloma and a few weeks was diagnosed with a DVT. She does have frequent episodes of nausea and also constipation (likely a result of her home opioids). She denies any fevers, chills, shortness of breath.        Hospital Course;    patient was treated with IV hydration.    she was treated with chemo of cyclophosphamide had s/e of n/v treated with zofran and ativan iv prn- will continue to get out patient with Dr Meade    She has s/e on nausea / vomiting sec to chemotherapy treated with v zofran and ativan     she was able to be d/c home on Monday to get Neulasta on with Oncology Dr Meade  at [phone]

## 2019-12-14 LAB
ALBUMIN SERPL ELPH-MCNC: 3.8 G/DL — SIGNIFICANT CHANGE UP (ref 3.3–5)
ALP SERPL-CCNC: 51 U/L — SIGNIFICANT CHANGE UP (ref 40–120)
ALT FLD-CCNC: 14 U/L — SIGNIFICANT CHANGE UP (ref 4–33)
ANION GAP SERPL CALC-SCNC: 13 MMO/L — SIGNIFICANT CHANGE UP (ref 7–14)
AST SERPL-CCNC: 14 U/L — SIGNIFICANT CHANGE UP (ref 4–32)
BASOPHILS # BLD AUTO: 0.01 K/UL — SIGNIFICANT CHANGE UP (ref 0–0.2)
BASOPHILS NFR BLD AUTO: 0.4 % — SIGNIFICANT CHANGE UP (ref 0–2)
BASOPHILS NFR SPEC: 0 % — SIGNIFICANT CHANGE UP (ref 0–2)
BILIRUB SERPL-MCNC: 0.5 MG/DL — SIGNIFICANT CHANGE UP (ref 0.2–1.2)
BLASTS # FLD: 0 % — SIGNIFICANT CHANGE UP (ref 0–0)
BUN SERPL-MCNC: 10 MG/DL — SIGNIFICANT CHANGE UP (ref 7–23)
CALCIUM SERPL-MCNC: 9.3 MG/DL — SIGNIFICANT CHANGE UP (ref 8.4–10.5)
CHLORIDE SERPL-SCNC: 101 MMOL/L — SIGNIFICANT CHANGE UP (ref 98–107)
CO2 SERPL-SCNC: 24 MMOL/L — SIGNIFICANT CHANGE UP (ref 22–31)
CREAT SERPL-MCNC: 0.57 MG/DL — SIGNIFICANT CHANGE UP (ref 0.5–1.3)
EOSINOPHIL # BLD AUTO: 0 K/UL — SIGNIFICANT CHANGE UP (ref 0–0.5)
EOSINOPHIL NFR BLD AUTO: 0 % — SIGNIFICANT CHANGE UP (ref 0–6)
EOSINOPHIL NFR FLD: 0 % — SIGNIFICANT CHANGE UP (ref 0–6)
GIANT PLATELETS BLD QL SMEAR: PRESENT — SIGNIFICANT CHANGE UP
GLUCOSE SERPL-MCNC: 94 MG/DL — SIGNIFICANT CHANGE UP (ref 70–99)
HCT VFR BLD CALC: 34.2 % — LOW (ref 34.5–45)
HGB BLD-MCNC: 10.8 G/DL — LOW (ref 11.5–15.5)
IMM GRANULOCYTES NFR BLD AUTO: 0.4 % — SIGNIFICANT CHANGE UP (ref 0–1.5)
LYMPHOCYTES # BLD AUTO: 0.72 K/UL — LOW (ref 1–3.3)
LYMPHOCYTES # BLD AUTO: 25.6 % — SIGNIFICANT CHANGE UP (ref 13–44)
LYMPHOCYTES NFR SPEC AUTO: 16.7 % — SIGNIFICANT CHANGE UP (ref 13–44)
MAGNESIUM SERPL-MCNC: 1.9 MG/DL — SIGNIFICANT CHANGE UP (ref 1.6–2.6)
MCHC RBC-ENTMCNC: 29 PG — SIGNIFICANT CHANGE UP (ref 27–34)
MCHC RBC-ENTMCNC: 31.6 % — LOW (ref 32–36)
MCV RBC AUTO: 91.7 FL — SIGNIFICANT CHANGE UP (ref 80–100)
METAMYELOCYTES # FLD: 0 % — SIGNIFICANT CHANGE UP (ref 0–1)
MONOCYTES # BLD AUTO: 0.6 K/UL — SIGNIFICANT CHANGE UP (ref 0–0.9)
MONOCYTES NFR BLD AUTO: 21.4 % — HIGH (ref 2–14)
MONOCYTES NFR BLD: 14 % — HIGH (ref 2–9)
MYELOCYTES NFR BLD: 0 % — SIGNIFICANT CHANGE UP (ref 0–0)
NEUTROPHIL AB SER-ACNC: 63.2 % — SIGNIFICANT CHANGE UP (ref 43–77)
NEUTROPHILS # BLD AUTO: 1.47 K/UL — LOW (ref 1.8–7.4)
NEUTROPHILS NFR BLD AUTO: 52.2 % — SIGNIFICANT CHANGE UP (ref 43–77)
NEUTS BAND # BLD: 0 % — SIGNIFICANT CHANGE UP (ref 0–6)
NRBC # FLD: 0 K/UL — SIGNIFICANT CHANGE UP (ref 0–0)
OTHER - HEMATOLOGY %: 0 — SIGNIFICANT CHANGE UP
OVALOCYTES BLD QL SMEAR: SLIGHT — SIGNIFICANT CHANGE UP
PHOSPHATE SERPL-MCNC: 2.5 MG/DL — SIGNIFICANT CHANGE UP (ref 2.5–4.5)
PLATELET # BLD AUTO: 199 K/UL — SIGNIFICANT CHANGE UP (ref 150–400)
PLATELET COUNT - ESTIMATE: NORMAL — SIGNIFICANT CHANGE UP
PMV BLD: 9.4 FL — SIGNIFICANT CHANGE UP (ref 7–13)
POIKILOCYTOSIS BLD QL AUTO: SLIGHT — SIGNIFICANT CHANGE UP
POTASSIUM SERPL-MCNC: 3.7 MMOL/L — SIGNIFICANT CHANGE UP (ref 3.5–5.3)
POTASSIUM SERPL-SCNC: 3.7 MMOL/L — SIGNIFICANT CHANGE UP (ref 3.5–5.3)
PROMYELOCYTES # FLD: 0 % — SIGNIFICANT CHANGE UP (ref 0–0)
PROT SERPL-MCNC: 6.7 G/DL — SIGNIFICANT CHANGE UP (ref 6–8.3)
RBC # BLD: 3.73 M/UL — LOW (ref 3.8–5.2)
RBC # FLD: 15.9 % — HIGH (ref 10.3–14.5)
SODIUM SERPL-SCNC: 138 MMOL/L — SIGNIFICANT CHANGE UP (ref 135–145)
VARIANT LYMPHS # BLD: 6.1 % — SIGNIFICANT CHANGE UP
WBC # BLD: 2.81 K/UL — LOW (ref 3.8–10.5)
WBC # FLD AUTO: 2.81 K/UL — LOW (ref 3.8–10.5)

## 2019-12-14 PROCEDURE — 99233 SBSQ HOSP IP/OBS HIGH 50: CPT

## 2019-12-14 RX ORDER — SIMETHICONE 80 MG/1
80 TABLET, CHEWABLE ORAL ONCE
Refills: 0 | Status: COMPLETED | OUTPATIENT
Start: 2019-12-14 | End: 2019-12-14

## 2019-12-14 RX ADMIN — FENTANYL CITRATE 1 PATCH: 50 INJECTION INTRAVENOUS at 06:32

## 2019-12-14 RX ADMIN — Medication 10 MILLIGRAM(S): at 01:07

## 2019-12-14 RX ADMIN — FENTANYL CITRATE 1 PATCH: 50 INJECTION INTRAVENOUS at 07:35

## 2019-12-14 RX ADMIN — CHLORHEXIDINE GLUCONATE 15 MILLILITER(S): 213 SOLUTION TOPICAL at 19:35

## 2019-12-14 RX ADMIN — FENTANYL CITRATE 1 PATCH: 50 INJECTION INTRAVENOUS at 07:37

## 2019-12-14 RX ADMIN — Medication 10 MILLIGRAM(S): at 19:35

## 2019-12-14 RX ADMIN — SIMETHICONE 80 MILLIGRAM(S): 80 TABLET, CHEWABLE ORAL at 15:55

## 2019-12-14 RX ADMIN — CHLORHEXIDINE GLUCONATE 1 APPLICATION(S): 213 SOLUTION TOPICAL at 19:35

## 2019-12-14 RX ADMIN — Medication 0.5 MILLIGRAM(S): at 03:00

## 2019-12-14 RX ADMIN — OXYCODONE HYDROCHLORIDE 20 MILLIGRAM(S): 5 TABLET ORAL at 15:28

## 2019-12-14 RX ADMIN — ONDANSETRON 4 MILLIGRAM(S): 8 TABLET, FILM COATED ORAL at 09:30

## 2019-12-14 RX ADMIN — FENTANYL CITRATE 1 PATCH: 50 INJECTION INTRAVENOUS at 06:33

## 2019-12-14 RX ADMIN — FENTANYL CITRATE 1 PATCH: 50 INJECTION INTRAVENOUS at 19:50

## 2019-12-14 RX ADMIN — OXYCODONE HYDROCHLORIDE 20 MILLIGRAM(S): 5 TABLET ORAL at 10:18

## 2019-12-14 RX ADMIN — Medication 0.5 MILLIGRAM(S): at 12:44

## 2019-12-14 RX ADMIN — CHLORHEXIDINE GLUCONATE 15 MILLILITER(S): 213 SOLUTION TOPICAL at 06:27

## 2019-12-14 RX ADMIN — OXYCODONE HYDROCHLORIDE 20 MILLIGRAM(S): 5 TABLET ORAL at 09:30

## 2019-12-14 RX ADMIN — APIXABAN 5 MILLIGRAM(S): 2.5 TABLET, FILM COATED ORAL at 06:25

## 2019-12-14 RX ADMIN — OXYCODONE HYDROCHLORIDE 20 MILLIGRAM(S): 5 TABLET ORAL at 16:11

## 2019-12-14 RX ADMIN — ESCITALOPRAM OXALATE 20 MILLIGRAM(S): 10 TABLET, FILM COATED ORAL at 14:28

## 2019-12-14 NOTE — PROVIDER CONTACT NOTE (OTHER) - ACTION/TREATMENT ORDERED:
ARISTEO Pisano notified, orthostatic VS ordered to be done in am.
PT MONITORED AND STILL WITH N/V AFTER 1 HR REGLAN 10MG IV GIVEN WITH RELIEF ,WILL CONTINUE TO MONITOR CLOSELY

## 2019-12-14 NOTE — PROGRESS NOTE ADULT - ASSESSMENT
A: MM on Cytoxan at this time, but patiently adamantly refuses to continue chemotherapy at this time and F/U    P: Stop chemotherapy. We'll speak to Dr Meade about further treatment as outpatient

## 2019-12-14 NOTE — PROGRESS NOTE ADULT - PROBLEM SELECTOR PLAN 1
Diagnosed in 2010  s/p multiple lines of therapy including chemo and radiation  c/b multiple fractures and cancer related pain  Chemo planned by   Dr. Meade  Getting day #3 of  4 days of Cisplatin, Etoposide, Cyclophosphomide, and Dex, will discuss with Heme/Onc as patient does not want additional treatment  Will monitor TLS labs daily.  Will added Ua level- ? role for colchicine?

## 2019-12-14 NOTE — PROGRESS NOTE ADULT - SUBJECTIVE AND OBJECTIVE BOX
S: Patient states feeling very nauseous with abdominal discomfort  Wants to stop chemotherapy  O: BS present but dereased  Heart: RR   Abdomen : Benign  Extremities" No edema

## 2019-12-14 NOTE — PROGRESS NOTE ADULT - SUBJECTIVE AND OBJECTIVE BOX
[facility] Division of Hospital Medicine  Nayeli Wiley DO  Pager: [phone]  Other Times:  [phone]    Patient is a 79y old  Female who presents with a chief complaint of Inpatient chemo (13 Dec 2019 17:20)    SUBJECTIVE / OVERNIGHT EVENTS:  Was noted to have rigors last night, afebrile, vitals stable, orthostatics neg, when examined this morning, complained of vomiting last night, nausea, unable to take po, does not want to continue treatment, would like to speak to her Oncologist.    ADDITIONAL REVIEW OF SYSTEMS:    MEDICATIONS  (STANDING):  apixaban 5 milliGRAM(s) Oral two times a day  chlorhexidine 0.12% Liquid 15 milliLiter(s) Oral Mucosa two times a day  chlorhexidine 4% Liquid 1 Application(s) Topical <User Schedule>  cyclophosphamide IVPB w/additives (eMAR) 400 milliGRAM(s) IV Intermittent daily  dexAMETHasone  IVPB 20 milliGRAM(s) IV Intermittent daily  escitalopram 20 milliGRAM(s) Oral daily  fentaNYL   Patch  25 MICROgram(s)/Hr 1 Patch Transdermal every 72 hours  fentaNYL   Patch 100 MICROgram(s)/Hr. 1 Patch Transdermal <User Schedule>  multivitamin 1 Tablet(s) Oral daily  ondansetron  IVPB 16 milliGRAM(s) IV Intermittent daily  polyethylene glycol 3350 17 Gram(s) Oral daily  sodium chloride 0.9%. 1000 milliLiter(s) (75 mL/Hr) IV Continuous <Continuous>    MEDICATIONS  (PRN):  acetaminophen   Tablet .. 650 milliGRAM(s) Oral every 6 hours PRN Temp greater or equal to 38C (100.4F)  bisacodyl 5 milliGRAM(s) Oral every 12 hours PRN Constipation  diphenhydrAMINE 25 milliGRAM(s) Oral every 6 hours PRN PRURITIS  LORazepam   Injectable 0.5 milliGRAM(s) IV Push every 6 hours PRN Nausea and/or Vomiting  ondansetron Injectable 4 milliGRAM(s) IV Push every 12 hours PRN Nausea  oxyCODONE    IR 20 milliGRAM(s) Oral every 4 hours PRN Severe Pain (7 - 10)  prochlorperazine   Tablet 10 milliGRAM(s) Oral every 6 hours PRN NAUSEA      CAPILLARY BLOOD GLUCOSE        I&O's Summary      PHYSICAL EXAM:  Vital Signs Last 24 Hrs  T(C): 36.6 (14 Dec 2019 06:20), Max: 36.9 (13 Dec 2019 14:31)  T(F): 97.9 (14 Dec 2019 06:20), Max: 98.4 (13 Dec 2019 14:31)  HR: 72 (14 Dec 2019 02:59) (70 - 73)  BP: 158/83 (14 Dec 2019 02:59) (142/75 - 158/83)  BP(mean): --  RR: 18 (14 Dec 2019 06:20) (16 - 18)  SpO2: 96% (14 Dec 2019 06:20) (95% - 99%)  CONSTITUTIONAL: NAD, well-developed, well-groomed  EYES: PERRLA; conjunctiva and sclera clear  ENMT: Moist oral mucosa, no pharyngeal injection or exudates; normal dentition  NECK: Supple, no palpable masses; no thyromegaly  RESPIRATORY: Normal respiratory effort; lungs are clear to auscultation bilaterally  CARDIOVASCULAR: Regular rate and rhythm, normal S1 and S2, no murmur/rub/gallop; No lower extremity edema; Peripheral pulses are 2+ bilaterally  ABDOMEN: Nontender to palpation, normoactive bowel sounds, no rebound/guarding; No hepatosplenomegaly  MUSCLOSKELETAL:  Normal gait; no clubbing or cyanosis of digits; no joint swelling or tenderness to palpation  PSYCH: A+O to person, place, and time; affect appropriate  NEUROLOGY: CN 2-12 are intact and symmetric; no gross sensory deficits;   SKIN: No rashes; no palpable lesions    LABS:                        10.8   2.81  )-----------( 199      ( 14 Dec 2019 06:45 )             34.2     12-14    138  |  101  |  10  ----------------------------<  94  3.7   |  24  |  0.57    Ca    9.3      14 Dec 2019 06:45  Phos  2.5     12-14  Mg     1.9     12-14    TPro  6.7  /  Alb  3.8  /  TBili  0.5  /  DBili  x   /  AST  14  /  ALT  14  /  AlkPhos  51  12-14                RADIOLOGY & ADDITIONAL TESTS:  Results Reviewed:   Imaging Personally Reviewed:  Electrocardiogram Personally Reviewed:    COORDINATION OF CARE:  Care Discussed with Consultants/Other Providers [Y/N]:  Prior or Outpatient Records Reviewed [Y/N]:

## 2019-12-15 LAB
ALBUMIN SERPL ELPH-MCNC: 3.1 G/DL — LOW (ref 3.3–5)
ALP SERPL-CCNC: 45 U/L — SIGNIFICANT CHANGE UP (ref 40–120)
ALT FLD-CCNC: 11 U/L — SIGNIFICANT CHANGE UP (ref 4–33)
ANION GAP SERPL CALC-SCNC: 12 MMO/L — SIGNIFICANT CHANGE UP (ref 7–14)
AST SERPL-CCNC: 13 U/L — SIGNIFICANT CHANGE UP (ref 4–32)
BASOPHILS # BLD AUTO: 0.02 K/UL — SIGNIFICANT CHANGE UP (ref 0–0.2)
BASOPHILS NFR BLD AUTO: 1.2 % — SIGNIFICANT CHANGE UP (ref 0–2)
BILIRUB SERPL-MCNC: 0.5 MG/DL — SIGNIFICANT CHANGE UP (ref 0.2–1.2)
BUN SERPL-MCNC: 9 MG/DL — SIGNIFICANT CHANGE UP (ref 7–23)
CALCIUM SERPL-MCNC: 8.4 MG/DL — SIGNIFICANT CHANGE UP (ref 8.4–10.5)
CHLORIDE SERPL-SCNC: 101 MMOL/L — SIGNIFICANT CHANGE UP (ref 98–107)
CO2 SERPL-SCNC: 23 MMOL/L — SIGNIFICANT CHANGE UP (ref 22–31)
CREAT SERPL-MCNC: 0.54 MG/DL — SIGNIFICANT CHANGE UP (ref 0.5–1.3)
EOSINOPHIL # BLD AUTO: 0.05 K/UL — SIGNIFICANT CHANGE UP (ref 0–0.5)
EOSINOPHIL NFR BLD AUTO: 3 % — SIGNIFICANT CHANGE UP (ref 0–6)
GLUCOSE SERPL-MCNC: 84 MG/DL — SIGNIFICANT CHANGE UP (ref 70–99)
HCT VFR BLD CALC: 32.3 % — LOW (ref 34.5–45)
HGB BLD-MCNC: 10.2 G/DL — LOW (ref 11.5–15.5)
IMM GRANULOCYTES NFR BLD AUTO: 0 % — SIGNIFICANT CHANGE UP (ref 0–1.5)
LYMPHOCYTES # BLD AUTO: 0.42 K/UL — LOW (ref 1–3.3)
LYMPHOCYTES # BLD AUTO: 24.9 % — SIGNIFICANT CHANGE UP (ref 13–44)
MAGNESIUM SERPL-MCNC: 1.9 MG/DL — SIGNIFICANT CHANGE UP (ref 1.6–2.6)
MANUAL SMEAR VERIFICATION: SIGNIFICANT CHANGE UP
MCHC RBC-ENTMCNC: 28.8 PG — SIGNIFICANT CHANGE UP (ref 27–34)
MCHC RBC-ENTMCNC: 31.6 % — LOW (ref 32–36)
MCV RBC AUTO: 91.2 FL — SIGNIFICANT CHANGE UP (ref 80–100)
MONOCYTES # BLD AUTO: 0.27 K/UL — SIGNIFICANT CHANGE UP (ref 0–0.9)
MONOCYTES NFR BLD AUTO: 16 % — HIGH (ref 2–14)
NEUTROPHILS # BLD AUTO: 0.93 K/UL — LOW (ref 1.8–7.4)
NEUTROPHILS NFR BLD AUTO: 54.9 % — SIGNIFICANT CHANGE UP (ref 43–77)
NRBC # FLD: 0 K/UL — SIGNIFICANT CHANGE UP (ref 0–0)
PHOSPHATE SERPL-MCNC: 2.6 MG/DL — SIGNIFICANT CHANGE UP (ref 2.5–4.5)
PLATELET # BLD AUTO: 159 K/UL — SIGNIFICANT CHANGE UP (ref 150–400)
PMV BLD: 9.6 FL — SIGNIFICANT CHANGE UP (ref 7–13)
POTASSIUM SERPL-MCNC: 3.4 MMOL/L — LOW (ref 3.5–5.3)
POTASSIUM SERPL-SCNC: 3.4 MMOL/L — LOW (ref 3.5–5.3)
PROT SERPL-MCNC: 5.8 G/DL — LOW (ref 6–8.3)
RBC # BLD: 3.54 M/UL — LOW (ref 3.8–5.2)
RBC # FLD: 16 % — HIGH (ref 10.3–14.5)
SODIUM SERPL-SCNC: 136 MMOL/L — SIGNIFICANT CHANGE UP (ref 135–145)
WBC # BLD: 1.69 K/UL — LOW (ref 3.8–10.5)
WBC # FLD AUTO: 1.69 K/UL — LOW (ref 3.8–10.5)

## 2019-12-15 PROCEDURE — 99232 SBSQ HOSP IP/OBS MODERATE 35: CPT

## 2019-12-15 RX ORDER — DOCUSATE SODIUM 100 MG
100 CAPSULE ORAL
Refills: 0 | Status: DISCONTINUED | OUTPATIENT
Start: 2019-12-15 | End: 2019-12-16

## 2019-12-15 RX ORDER — ACETAMINOPHEN 500 MG
1000 TABLET ORAL ONCE
Refills: 0 | Status: COMPLETED | OUTPATIENT
Start: 2019-12-15 | End: 2019-12-15

## 2019-12-15 RX ORDER — POTASSIUM CHLORIDE 20 MEQ
40 PACKET (EA) ORAL EVERY 4 HOURS
Refills: 0 | Status: DISCONTINUED | OUTPATIENT
Start: 2019-12-15 | End: 2019-12-15

## 2019-12-15 RX ORDER — POTASSIUM CHLORIDE 20 MEQ
10 PACKET (EA) ORAL
Refills: 0 | Status: COMPLETED | OUTPATIENT
Start: 2019-12-15 | End: 2019-12-15

## 2019-12-15 RX ADMIN — Medication 100 MILLIEQUIVALENT(S): at 16:34

## 2019-12-15 RX ADMIN — SODIUM CHLORIDE 75 MILLILITER(S): 9 INJECTION INTRAMUSCULAR; INTRAVENOUS; SUBCUTANEOUS at 16:34

## 2019-12-15 RX ADMIN — SODIUM CHLORIDE 75 MILLILITER(S): 9 INJECTION INTRAMUSCULAR; INTRAVENOUS; SUBCUTANEOUS at 00:49

## 2019-12-15 RX ADMIN — APIXABAN 5 MILLIGRAM(S): 2.5 TABLET, FILM COATED ORAL at 07:10

## 2019-12-15 RX ADMIN — CHLORHEXIDINE GLUCONATE 15 MILLILITER(S): 213 SOLUTION TOPICAL at 07:10

## 2019-12-15 RX ADMIN — Medication 100 MILLIEQUIVALENT(S): at 17:41

## 2019-12-15 RX ADMIN — Medication 1 TABLET(S): at 10:37

## 2019-12-15 RX ADMIN — Medication 1000 MILLIGRAM(S): at 22:30

## 2019-12-15 RX ADMIN — FENTANYL CITRATE 1 PATCH: 50 INJECTION INTRAVENOUS at 21:39

## 2019-12-15 RX ADMIN — OXYCODONE HYDROCHLORIDE 20 MILLIGRAM(S): 5 TABLET ORAL at 15:00

## 2019-12-15 RX ADMIN — ONDANSETRON 4 MILLIGRAM(S): 8 TABLET, FILM COATED ORAL at 12:52

## 2019-12-15 RX ADMIN — CHLORHEXIDINE GLUCONATE 15 MILLILITER(S): 213 SOLUTION TOPICAL at 17:41

## 2019-12-15 RX ADMIN — Medication 0.5 MILLIGRAM(S): at 00:49

## 2019-12-15 RX ADMIN — Medication 0.5 MILLIGRAM(S): at 21:31

## 2019-12-15 RX ADMIN — OXYCODONE HYDROCHLORIDE 20 MILLIGRAM(S): 5 TABLET ORAL at 15:45

## 2019-12-15 RX ADMIN — Medication 100 MILLIGRAM(S): at 07:12

## 2019-12-15 RX ADMIN — ESCITALOPRAM OXALATE 20 MILLIGRAM(S): 10 TABLET, FILM COATED ORAL at 10:37

## 2019-12-15 RX ADMIN — CHLORHEXIDINE GLUCONATE 1 APPLICATION(S): 213 SOLUTION TOPICAL at 10:01

## 2019-12-15 RX ADMIN — FENTANYL CITRATE 1 PATCH: 50 INJECTION INTRAVENOUS at 07:15

## 2019-12-15 RX ADMIN — FENTANYL CITRATE 1 PATCH: 50 INJECTION INTRAVENOUS at 07:16

## 2019-12-15 RX ADMIN — Medication 400 MILLIGRAM(S): at 21:56

## 2019-12-15 NOTE — PROVIDER CONTACT NOTE (MEDICATION) - ACTION/TREATMENT ORDERED:
educated patient on eliquis, gave printed material on medication . notified Susan Haddad . as per ACP : reschedule medication for 9pm and offer it to patient. If she refuses, contact ACP to come to bedside to talk to patient in person.

## 2019-12-15 NOTE — PROGRESS NOTE ADULT - SUBJECTIVE AND OBJECTIVE BOX
[facility] Division of Hospital Medicine  Nayeli Wiley DO  Pager: [phone]  Other Times:  [phone]    Patient is a 79y old  Female who presents with a chief complaint of Inpatient chemo (14 Dec 2019 12:31)    SUBJECTIVE / OVERNIGHT EVENTS:  Patient denying any recurrent N/V, was only able to eat crackers with water, poor po appetite.    ADDITIONAL REVIEW OF SYSTEMS:    MEDICATIONS  (STANDING):  apixaban 5 milliGRAM(s) Oral two times a day  chlorhexidine 0.12% Liquid 15 milliLiter(s) Oral Mucosa two times a day  chlorhexidine 4% Liquid 1 Application(s) Topical <User Schedule>  docusate sodium 100 milliGRAM(s) Oral two times a day  escitalopram 20 milliGRAM(s) Oral daily  fentaNYL   Patch  25 MICROgram(s)/Hr 1 Patch Transdermal every 72 hours  fentaNYL   Patch 100 MICROgram(s)/Hr. 1 Patch Transdermal <User Schedule>  multivitamin 1 Tablet(s) Oral daily  polyethylene glycol 3350 17 Gram(s) Oral daily  sodium chloride 0.9%. 1000 milliLiter(s) (75 mL/Hr) IV Continuous <Continuous>    MEDICATIONS  (PRN):  acetaminophen   Tablet .. 650 milliGRAM(s) Oral every 6 hours PRN Temp greater or equal to 38C (100.4F)  bisacodyl 5 milliGRAM(s) Oral every 12 hours PRN Constipation  diphenhydrAMINE 25 milliGRAM(s) Oral every 6 hours PRN PRURITIS  LORazepam   Injectable 0.5 milliGRAM(s) IV Push every 6 hours PRN Nausea and/or Vomiting  ondansetron Injectable 4 milliGRAM(s) IV Push every 12 hours PRN Nausea  oxyCODONE    IR 20 milliGRAM(s) Oral every 4 hours PRN Severe Pain (7 - 10)  prochlorperazine   Tablet 10 milliGRAM(s) Oral every 6 hours PRN NAUSEA      CAPILLARY BLOOD GLUCOSE        I&O's Summary      PHYSICAL EXAM:  Vital Signs Last 24 Hrs  T(C): 37 (15 Dec 2019 07:08), Max: 37 (15 Dec 2019 07:08)  T(F): 98.6 (15 Dec 2019 07:08), Max: 98.6 (15 Dec 2019 07:08)  HR: 70 (15 Dec 2019 07:08) (67 - 70)  BP: 150/82 (15 Dec 2019 07:08) (150/72 - 154/81)  BP(mean): --  RR: 18 (15 Dec 2019 07:08) (18 - 20)  SpO2: 95% (15 Dec 2019 07:08) (95% - 97%)  CONSTITUTIONAL: NAD, well-developed, well-groomed  EYES: PERRLA; conjunctiva and sclera clear  ENMT: Moist oral mucosa, no pharyngeal injection or exudates; normal dentition  NECK: Supple, no palpable masses; no thyromegaly  RESPIRATORY: Normal respiratory effort; lungs are clear to auscultation bilaterally  CARDIOVASCULAR: Regular rate and rhythm, normal S1 and S2, no murmur/rub/gallop; No lower extremity edema; Peripheral pulses are 2+ bilaterally  ABDOMEN: Nontender to palpation, normoactive bowel sounds, no rebound/guarding; No hepatosplenomegaly  MUSCLOSKELETAL:  Normal gait; no clubbing or cyanosis of digits; no joint swelling or tenderness to palpation  PSYCH: A+O to person, place, and time; affect appropriate  NEUROLOGY: CN 2-12 are intact and symmetric; no gross sensory deficits;   SKIN: No rashes; no palpable lesions    LABS:                        10.2   1.69  )-----------( 159      ( 15 Dec 2019 05:45 )             32.3     12-15    136  |  101  |  9   ----------------------------<  84  3.4<L>   |  23  |  0.54    Ca    8.4      15 Dec 2019 05:45  Phos  2.6     12-15  Mg     1.9     12-15    TPro  5.8<L>  /  Alb  3.1<L>  /  TBili  0.5  /  DBili  x   /  AST  13  /  ALT  11  /  AlkPhos  45  12-15                RADIOLOGY & ADDITIONAL TESTS:  Results Reviewed:   Imaging Personally Reviewed:  Electrocardiogram Personally Reviewed:    COORDINATION OF CARE:  Care Discussed with Consultants/Other Providers [Y/N]:  Prior or Outpatient Records Reviewed [Y/N]:

## 2019-12-15 NOTE — PROGRESS NOTE ADULT - PROBLEM SELECTOR PLAN 1
Diagnosed in 2010  s/p multiple lines of therapy including chemo and radiation  c/b multiple fractures and cancer related pain  Chemo planned by Dr. Meade, was planned for 4 days of Cisplatin, Etoposide, Cyclophosphomide, and Dex, as of yesterday patient has been refusing any more chemotherapy  Consider palliative consult tomorrow after discussing with Dr. Meade

## 2019-12-15 NOTE — PROGRESS NOTE ADULT - SUBJECTIVE AND OBJECTIVE BOX
Multiple Myeloma- Recurrent and refractory  Post multiple lines of Chemotherapy  Refusing further treatment with this line (DCEP)  Still complaining of nauseas    PE  Unchanged from 12/14/19  Complete Blood Count + Automated Diff in AM (12.15.19 @ 05:45)    Nucleated RBC #: 0 K/uL    Manual Smear Verification: REVIEWED WITHIN 72HR    WBC Count: 1.69 K/uL    RBC Count: 3.54 M/uL    Hemoglobin: 10.2 g/dL    Hematocrit: 32.3 %    Mean Cell Volume: 91.2 fL    Mean Cell Hemoglobin: 28.8 pg    Mean Cell Hemoglobin Conc: 31.6 %    Red Cell Distrib Width: 16.0 %    Platelet Count - Automated: 159 K/uL    MPV: 9.6 fl    Auto Neutrophil #: 0.93 K/uL    Auto Lymphocyte #: 0.42 K/uL    Auto Monocyte #: 0.27 K/uL    Auto Eosinophil #: 0.05 K/uL    Auto Basophil #: 0.02 K/uL    Auto Neutrophil %: 54.9 %    Auto Lymphocyte %: 24.9 %    Auto Monocyte %: 16.0 %    Auto Eosinophil %: 3.0 %    Auto Basophil %: 1.2 %    Auto Immature Granulocyte %: 0: (Includes meta, myelo and promyelocytes) %    Comprehensive Metabolic, Mg + Phosphorus (12.15.19 @ 05:45)    Sodium, Serum: 136 mmol/L    Potassium, Serum: 3.4 mmol/L    Chloride, Serum: 101 mmol/L    Carbon Dioxide, Serum: 23 mmol/L    Anion Gap, Serum: 12 mmo/L    Blood Urea Nitrogen, Serum: 9 mg/dL    Creatinine, Serum: 0.54 mg/dL    Glucose, Serum: 84 mg/dL    Calcium, Total Serum: 8.4 mg/dL    Protein Total, Serum: 5.8 g/dL    Albumin, Serum: 3.1 g/dL    Bilirubin Total, Serum: 0.5 mg/dL    Alkaline Phosphatase, Serum: 45 u/L    Aspartate Aminotransferase (AST/SGOT): 13 u/L    Alanine Aminotransferase (ALT/SGPT): 11 u/L    Magnesium, Serum: 1.9 mg/dL    Phosphorus Level, Serum: 2.6 mg/dL    eGFR if Non : 89: The units for eGFR are ml/min/1.73m2 (normalized body  surface area). The eGFR is calculated from a serum  creatinine using the CKD-EPI equation. Other variables  required for calculation are race, age and sex. Among  patients with chronic kidney disease (CKD), the eGFR is  useful in determining the stage of disease according to  KDOQI CKD classification. All eGFR results are reported  numerically with the following interpretation.    GFR  (ml/min/1.73 m2)          W/KIDNEY DAMAGE    W/O KIDNEY DMG  ==========================================================  >= 90.......................Stage 1..............Normal  60-89.......................Stage 2...........Decreased GFR  30-59.......................Stage 3..............Stage 3  15-29.......................Stage 4..............Stage 4  < 15........................Stage 5..............Stage 5    Each stage of CKD assumes that the associated GFR level  has been in effect for at least 3 months. Determination of  stages one and two (with eGFR > 59ml/min/m2) requires  estimation of kidney damage for at least 3 months as  defined by structural or functional abnormalities.    Limitations: All estimates of GFR will be less accurate  for patients at extremes of muscle mass (including but  not limited to frail elderly, critically ill, or cancer  patients), those with unusual diets, and those with  conditions associated with reduced secretion or  extrarenal elimination of creatinine. The eGFR equation  is not recommended for use in patients with unstable  creatinine levels. mL/min    eGFR if : 103 mL/min    A; Mildly neutropenic  F/U CBC  Patient adamantly refuses to continue chemotherapy  P: We'll discuss with Dr Meade in AM

## 2019-12-15 NOTE — PROVIDER CONTACT NOTE (MEDICATION) - ASSESSMENT
patient  vital stable and patient complained that patient that stomach feels upset and feels nausea, offered ativan , patient refused. offered colace for constipation. patient refused.

## 2019-12-16 ENCOUNTER — TRANSCRIPTION ENCOUNTER (OUTPATIENT)
Age: 79
End: 2019-12-16

## 2019-12-16 VITALS
OXYGEN SATURATION: 96 % | SYSTOLIC BLOOD PRESSURE: 149 MMHG | RESPIRATION RATE: 16 BRPM | TEMPERATURE: 98 F | DIASTOLIC BLOOD PRESSURE: 83 MMHG | HEART RATE: 89 BPM

## 2019-12-16 LAB
ALBUMIN SERPL ELPH-MCNC: 3.8 G/DL — SIGNIFICANT CHANGE UP (ref 3.3–5)
ALP SERPL-CCNC: 55 U/L — SIGNIFICANT CHANGE UP (ref 40–120)
ALT FLD-CCNC: 17 U/L — SIGNIFICANT CHANGE UP (ref 4–33)
ANION GAP SERPL CALC-SCNC: 10 MMO/L — SIGNIFICANT CHANGE UP (ref 7–14)
AST SERPL-CCNC: 13 U/L — SIGNIFICANT CHANGE UP (ref 4–32)
BASOPHILS # BLD AUTO: 0.03 K/UL — SIGNIFICANT CHANGE UP (ref 0–0.2)
BASOPHILS NFR BLD AUTO: 1.6 % — SIGNIFICANT CHANGE UP (ref 0–2)
BILIRUB SERPL-MCNC: 0.6 MG/DL — SIGNIFICANT CHANGE UP (ref 0.2–1.2)
BUN SERPL-MCNC: 6 MG/DL — LOW (ref 7–23)
CALCIUM SERPL-MCNC: 9.2 MG/DL — SIGNIFICANT CHANGE UP (ref 8.4–10.5)
CHLORIDE SERPL-SCNC: 99 MMOL/L — SIGNIFICANT CHANGE UP (ref 98–107)
CO2 SERPL-SCNC: 29 MMOL/L — SIGNIFICANT CHANGE UP (ref 22–31)
CREAT SERPL-MCNC: 0.52 MG/DL — SIGNIFICANT CHANGE UP (ref 0.5–1.3)
EOSINOPHIL # BLD AUTO: 0.19 K/UL — SIGNIFICANT CHANGE UP (ref 0–0.5)
EOSINOPHIL NFR BLD AUTO: 10.3 % — HIGH (ref 0–6)
GLUCOSE SERPL-MCNC: 96 MG/DL — SIGNIFICANT CHANGE UP (ref 70–99)
HCT VFR BLD CALC: 39.2 % — SIGNIFICANT CHANGE UP (ref 34.5–45)
HGB BLD-MCNC: 12.2 G/DL — SIGNIFICANT CHANGE UP (ref 11.5–15.5)
IMM GRANULOCYTES NFR BLD AUTO: 0 % — SIGNIFICANT CHANGE UP (ref 0–1.5)
LYMPHOCYTES # BLD AUTO: 0.36 K/UL — LOW (ref 1–3.3)
LYMPHOCYTES # BLD AUTO: 19.6 % — SIGNIFICANT CHANGE UP (ref 13–44)
MAGNESIUM SERPL-MCNC: 1.8 MG/DL — SIGNIFICANT CHANGE UP (ref 1.6–2.6)
MCHC RBC-ENTMCNC: 28.6 PG — SIGNIFICANT CHANGE UP (ref 27–34)
MCHC RBC-ENTMCNC: 31.1 % — LOW (ref 32–36)
MCV RBC AUTO: 92 FL — SIGNIFICANT CHANGE UP (ref 80–100)
MONOCYTES # BLD AUTO: 0.16 K/UL — SIGNIFICANT CHANGE UP (ref 0–0.9)
MONOCYTES NFR BLD AUTO: 8.7 % — SIGNIFICANT CHANGE UP (ref 2–14)
NEUTROPHILS # BLD AUTO: 1.1 K/UL — LOW (ref 1.8–7.4)
NEUTROPHILS NFR BLD AUTO: 59.8 % — SIGNIFICANT CHANGE UP (ref 43–77)
NRBC # FLD: 0 K/UL — SIGNIFICANT CHANGE UP (ref 0–0)
PHOSPHATE SERPL-MCNC: 3 MG/DL — SIGNIFICANT CHANGE UP (ref 2.5–4.5)
PLATELET # BLD AUTO: 177 K/UL — SIGNIFICANT CHANGE UP (ref 150–400)
PMV BLD: 9.4 FL — SIGNIFICANT CHANGE UP (ref 7–13)
POTASSIUM SERPL-MCNC: 3.6 MMOL/L — SIGNIFICANT CHANGE UP (ref 3.5–5.3)
POTASSIUM SERPL-SCNC: 3.6 MMOL/L — SIGNIFICANT CHANGE UP (ref 3.5–5.3)
PROT SERPL-MCNC: 6.5 G/DL — SIGNIFICANT CHANGE UP (ref 6–8.3)
RBC # BLD: 4.26 M/UL — SIGNIFICANT CHANGE UP (ref 3.8–5.2)
RBC # FLD: 16 % — HIGH (ref 10.3–14.5)
SODIUM SERPL-SCNC: 138 MMOL/L — SIGNIFICANT CHANGE UP (ref 135–145)
WBC # BLD: 1.84 K/UL — LOW (ref 3.8–10.5)
WBC # FLD AUTO: 1.84 K/UL — LOW (ref 3.8–10.5)

## 2019-12-16 PROCEDURE — 99239 HOSP IP/OBS DSCHRG MGMT >30: CPT

## 2019-12-16 RX ORDER — POLYETHYLENE GLYCOL 3350 17 G/17G
17 POWDER, FOR SOLUTION ORAL
Qty: 0 | Refills: 0 | DISCHARGE
Start: 2019-12-16

## 2019-12-16 RX ORDER — OXYCODONE HYDROCHLORIDE 5 MG/1
1 TABLET ORAL
Qty: 0 | Refills: 0 | DISCHARGE

## 2019-12-16 RX ORDER — LACTULOSE 10 G/15ML
10 SOLUTION ORAL EVERY 6 HOURS
Refills: 0 | Status: DISCONTINUED | OUTPATIENT
Start: 2019-12-16 | End: 2019-12-16

## 2019-12-16 RX ADMIN — CHLORHEXIDINE GLUCONATE 1 APPLICATION(S): 213 SOLUTION TOPICAL at 11:31

## 2019-12-16 RX ADMIN — Medication 1 TABLET(S): at 11:31

## 2019-12-16 RX ADMIN — POLYETHYLENE GLYCOL 3350 17 GRAM(S): 17 POWDER, FOR SOLUTION ORAL at 11:31

## 2019-12-16 RX ADMIN — OXYCODONE HYDROCHLORIDE 20 MILLIGRAM(S): 5 TABLET ORAL at 11:28

## 2019-12-16 RX ADMIN — Medication 0.5 MILLIGRAM(S): at 05:29

## 2019-12-16 RX ADMIN — FENTANYL CITRATE 1 PATCH: 50 INJECTION INTRAVENOUS at 06:00

## 2019-12-16 RX ADMIN — APIXABAN 5 MILLIGRAM(S): 2.5 TABLET, FILM COATED ORAL at 06:32

## 2019-12-16 RX ADMIN — ESCITALOPRAM OXALATE 20 MILLIGRAM(S): 10 TABLET, FILM COATED ORAL at 11:30

## 2019-12-16 NOTE — PROGRESS NOTE ADULT - PROBLEM SELECTOR PROBLEM 2
Cancer associated pain

## 2019-12-16 NOTE — PROGRESS NOTE ADULT - PROBLEM SELECTOR PLAN 6
Transitions of Care Status:  1.  Name of PCP: Dr. Meade  2.  PCP Contacted on Admission: [x] Y    [ ] N    3.  PCP contacted at Discharge: [ ] Y    [ ] N    [ ] N/A  4.  Post-Discharge Appointment Date and Location:  5.  Summary of Handoff given to PCP:
Transitions of Care Status:  1.  Name of PCP: Dr. Meade  2.  PCP Contacted on Admission: [x] Y    [ ] N    3.  PCP contacted at Discharge: [ Y] Y    [ ] N    [ ] N/A  4.  Post-Discharge Appointment Date and Location:  Will see Dr Meade today for Nuerlasta  5.  Summary of Handoff given to PCP:
Transitions of Care Status:  1.  Name of PCP: Dr. Meade  2.  PCP Contacted on Admission: [x] Y    [ ] N    3.  PCP contacted at Discharge: [ ] Y    [ ] N    [ ] N/A  4.  Post-Discharge Appointment Date and Location:  5.  Summary of Handoff given to PCP:

## 2019-12-16 NOTE — PROGRESS NOTE ADULT - SUBJECTIVE AND OBJECTIVE BOX
Patient is a 79y old  Female who presents with a chief complaint of Inpatient chemo (15 Dec 2019 16:15)      SUBJECTIVE / OVERNIGHT EVENTS:  patient noted n/v why she did not leave- explained Dr Meade wishes her to see him out patient for Neulasta.  Dr Meade will give all medications over Dr Meade office     MEDICATIONS  (STANDING):  apixaban 5 milliGRAM(s) Oral two times a day  chlorhexidine 0.12% Liquid 15 milliLiter(s) Oral Mucosa two times a day  chlorhexidine 4% Liquid 1 Application(s) Topical <User Schedule>  docusate sodium 100 milliGRAM(s) Oral two times a day  escitalopram 20 milliGRAM(s) Oral daily  fentaNYL   Patch  25 MICROgram(s)/Hr 1 Patch Transdermal every 72 hours  fentaNYL   Patch 100 MICROgram(s)/Hr. 1 Patch Transdermal <User Schedule>  multivitamin 1 Tablet(s) Oral daily  polyethylene glycol 3350 17 Gram(s) Oral daily  sodium chloride 0.9%. 1000 milliLiter(s) (75 mL/Hr) IV Continuous <Continuous>    MEDICATIONS  (PRN):  acetaminophen   Tablet .. 650 milliGRAM(s) Oral every 6 hours PRN Temp greater or equal to 38C (100.4F)  bisacodyl 5 milliGRAM(s) Oral every 12 hours PRN Constipation  diphenhydrAMINE 25 milliGRAM(s) Oral every 6 hours PRN PRURITIS  LORazepam   Injectable 0.5 milliGRAM(s) IV Push every 6 hours PRN Nausea and/or Vomiting  ondansetron Injectable 4 milliGRAM(s) IV Push every 12 hours PRN Nausea  oxyCODONE    IR 20 milliGRAM(s) Oral every 4 hours PRN Severe Pain (7 - 10)  prochlorperazine   Tablet 10 milliGRAM(s) Oral every 6 hours PRN NAUSEA    Vital Signs Last 24 Hrs  T(C): 36.8 (16 Dec 2019 06:04), Max: 37.1 (15 Dec 2019 21:38)  T(F): 98.2 (16 Dec 2019 06:04), Max: 98.8 (15 Dec 2019 21:38)  HR: 89 (16 Dec 2019 06:04) (70 - 89)  BP: 149/83 (16 Dec 2019 06:04) (149/83 - 169/93)  BP(mean): --  RR: 16 (16 Dec 2019 06:04) (16 - 19)  SpO2: 96% (16 Dec 2019 06:04) (95% - 97%)      PHYSICAL EXAM:  GENERAL: NAD, well-developed  HEAD:  Atraumatic, Normocephalic  EYES: EOMI, PERRLA, conjunctiva and sclera clear  NECK: Supple, No JVD  CHEST/LUNG: Clear to auscultation bilaterally; No wheeze  HEART: Regular rate and rhythm; No murmurs, rubs, or gallops  ABDOMEN: Soft, Nontender, Nondistended; Bowel sounds present  EXTREMITIES:  2+ Peripheral Pulses, No clubbing, cyanosis,   b/l leg edema R>l  PSYCH: AAOx3  NEUROLOGY: non-focal  SKIN: No rashes or lesions    LABS:                        12.2   1.84  )-----------( 177      ( 16 Dec 2019 05:45 )             39.2     12-16    138  |  99  |  6<L>  ----------------------------<  96  3.6   |  29  |  0.52    Ca    9.2      16 Dec 2019 05:45  Phos  3.0     12-16  Mg     1.8     12-16    TPro  6.5  /  Alb  3.8  /  TBili  0.6  /  DBili  x   /  AST  13  /  ALT  17  /  AlkPhos  55  12-16      RADIOLOGY & ADDITIONAL TESTS:  < from: Xray Chest 1 View- PORTABLE-Routine (12.10.19 @ 16:54) >  IMPRESSION:  Bibasilar linear atelectasis otherwise clear lungs. No pleural effusion   or pneumothorax.  The cardiomediastinal silhouette is normal in size and contour. Tip of   the right Mediport catheter within SVC.          Care Discussed with Consultants/Other Providers:

## 2019-12-16 NOTE — PROGRESS NOTE ADULT - ATTENDING COMMENTS
patient will see dr Meade today at [phone] for Neulasta- he will also give her pain medications./ zofran and ativan  Called son Delmar at [phone]- he will come for MOM and take to Dr Meade    50 min to coordinate d/c

## 2019-12-16 NOTE — PROGRESS NOTE ADULT - PROBLEM SELECTOR PROBLEM 1
Multiple myeloma in relapse

## 2019-12-16 NOTE — PROGRESS NOTE ADULT - PROBLEM SELECTOR PLAN 3
Will obtain another ultrasound   Patient takes eliquis daily and not BID as prescribed
Patient takes eliquis daily and not BID as prescribed
eliquis 5 BID
eliquis 5 BID
Will obtain another ultrasound   Patient takes eliquis daily and not BID as prescribed
Will obtain another ultrasound   Patient takes eliquis daily and not BID as prescribed

## 2019-12-16 NOTE — PROGRESS NOTE ADULT - PROBLEM SELECTOR PLAN 5
Start bowel regiment
Start bowel regiment
continue bowel regimen
continue bowel regimen
Start bowel regiment
Start bowel regiment

## 2019-12-16 NOTE — DISCHARGE NOTE NURSING/CASE MANAGEMENT/SOCIAL WORK - PATIENT PORTAL LINK FT
You can access the FollowMyHealth Patient Portal offered by [facility] by registering at the following website: [email or web address]. By joining [employer] FollowMyHealth portal, you will also be able to view your health information using other applications (apps) compatible with our system.

## 2019-12-16 NOTE — PROGRESS NOTE ADULT - ASSESSMENT
79 year old lady with multiple myeloma diagnosed in 2010 with disease progression c/b multiple fractures and DVT despite multiple lines of therapy presents for 4 days of planned inpatient chemo.  pancytopenia with neutropenia anc 640   L groin mass- ? Hematoma- consider pelvic ct to asses  Pancyptopenia improving   Completed chemo- ready for HOME- will see Dr Meade for Neurlast today    Chest 1 View- PORTABLE-Routine (12.10.19 @ 16:54) >  IMPRESSION:  Bibasilar linear atelectasis otherwise clear lungs. No pleural effusion   or pneumothorax.  The cardiomediastinal silhouette is normal in size and contour. Tip of   the right Mediport catheter within SVC.    < from: US Duplex Venous Lower Ext Complete, Bilateral (12.10.19 @ 14:44) >  IMPRESSION:   No evidence of deep venous thrombosis in either lower extremity.  Left groin mass measuring 6.1 x 2.7 x 3.3 cm abutting the adjacent common   femoral vein. Findings may reflect groin hematoma. Lymphadenopathy is   also a consideration.      no dvt  MM w/o remission  L groin mass 6cm

## 2019-12-16 NOTE — PROGRESS NOTE ADULT - PROBLEM SELECTOR PLAN 2
Will continue home meds  Fentanyl patch 100mcg   oxycodone 20mg

## 2019-12-16 NOTE — PROGRESS NOTE ADULT - PROBLEM SELECTOR PROBLEM 5
Drug-induced constipation

## 2019-12-28 ENCOUNTER — TRANSCRIPTION ENCOUNTER (OUTPATIENT)
Age: 79
End: 2019-12-28

## 2020-02-25 ENCOUNTER — INPATIENT (INPATIENT)
Facility: HOSPITAL | Age: 80
LOS: 6 days | Discharge: NURSING, REHAB OR LONG-TERM CARE | DRG: 464 | End: 2020-03-03
Attending: ORTHOPAEDIC SURGERY | Admitting: ORTHOPAEDIC SURGERY
Payer: MEDICARE

## 2020-02-25 VITALS
OXYGEN SATURATION: 96 % | TEMPERATURE: 98 F | WEIGHT: 143.08 LBS | HEART RATE: 76 BPM | DIASTOLIC BLOOD PRESSURE: 61 MMHG | HEIGHT: 65 IN | SYSTOLIC BLOOD PRESSURE: 140 MMHG | RESPIRATION RATE: 16 BRPM

## 2020-02-25 PROCEDURE — 99284 EMERGENCY DEPT VISIT MOD MDM: CPT

## 2020-02-26 DIAGNOSIS — C90.00 MULTIPLE MYELOMA NOT HAVING ACHIEVED REMISSION: ICD-10-CM

## 2020-02-26 DIAGNOSIS — M84.453A PATHOLOGICAL FRACTURE, UNSPECIFIED FEMUR, INITIAL ENCOUNTER FOR FRACTURE: ICD-10-CM

## 2020-02-26 DIAGNOSIS — R52 PAIN, UNSPECIFIED: ICD-10-CM

## 2020-02-26 LAB
24R-OH-CALCIDIOL SERPL-MCNC: 33 NG/ML — SIGNIFICANT CHANGE UP (ref 30–80)
ALBUMIN SERPL ELPH-MCNC: 4 G/DL — SIGNIFICANT CHANGE UP (ref 3.3–5)
ALP SERPL-CCNC: 44 U/L — SIGNIFICANT CHANGE UP (ref 40–120)
ALT FLD-CCNC: 14 U/L — SIGNIFICANT CHANGE UP (ref 10–45)
ANION GAP SERPL CALC-SCNC: 12 MMOL/L — SIGNIFICANT CHANGE UP (ref 5–17)
APTT BLD: 198.7 SEC — CRITICAL HIGH (ref 27.5–36.3)
APTT BLD: 29.4 SEC — SIGNIFICANT CHANGE UP (ref 27.5–36.3)
AST SERPL-CCNC: 23 U/L — SIGNIFICANT CHANGE UP (ref 10–40)
BASOPHILS # BLD AUTO: 0 K/UL — SIGNIFICANT CHANGE UP (ref 0–0.2)
BASOPHILS NFR BLD AUTO: 0 % — SIGNIFICANT CHANGE UP (ref 0–2)
BILIRUB SERPL-MCNC: 0.5 MG/DL — SIGNIFICANT CHANGE UP (ref 0.2–1.2)
BLD GP AB SCN SERPL QL: NEGATIVE — SIGNIFICANT CHANGE UP
BUN SERPL-MCNC: 20 MG/DL — SIGNIFICANT CHANGE UP (ref 7–23)
CALCIUM SERPL-MCNC: 8.9 MG/DL — SIGNIFICANT CHANGE UP (ref 8.4–10.5)
CHLORIDE SERPL-SCNC: 94 MMOL/L — LOW (ref 96–108)
CO2 SERPL-SCNC: 27 MMOL/L — SIGNIFICANT CHANGE UP (ref 22–31)
CREAT SERPL-MCNC: 0.59 MG/DL — SIGNIFICANT CHANGE UP (ref 0.5–1.3)
EOSINOPHIL # BLD AUTO: 0 K/UL — SIGNIFICANT CHANGE UP (ref 0–0.5)
EOSINOPHIL NFR BLD AUTO: 0 % — SIGNIFICANT CHANGE UP (ref 0–6)
GLUCOSE SERPL-MCNC: 142 MG/DL — HIGH (ref 70–99)
HCT VFR BLD CALC: 29.5 % — LOW (ref 34.5–45)
HCT VFR BLD CALC: 30.5 % — LOW (ref 34.5–45)
HGB BLD-MCNC: 9.7 G/DL — LOW (ref 11.5–15.5)
HGB BLD-MCNC: 9.9 G/DL — LOW (ref 11.5–15.5)
INR BLD: 1.11 RATIO — SIGNIFICANT CHANGE UP (ref 0.88–1.16)
LYMPHOCYTES # BLD AUTO: 0.36 K/UL — LOW (ref 1–3.3)
LYMPHOCYTES # BLD AUTO: 7.9 % — LOW (ref 13–44)
MANUAL SMEAR VERIFICATION: SIGNIFICANT CHANGE UP
MCHC RBC-ENTMCNC: 29.7 PG — SIGNIFICANT CHANGE UP (ref 27–34)
MCHC RBC-ENTMCNC: 30.7 PG — SIGNIFICANT CHANGE UP (ref 27–34)
MCHC RBC-ENTMCNC: 32.5 GM/DL — SIGNIFICANT CHANGE UP (ref 32–36)
MCHC RBC-ENTMCNC: 32.9 GM/DL — SIGNIFICANT CHANGE UP (ref 32–36)
MCV RBC AUTO: 91.6 FL — SIGNIFICANT CHANGE UP (ref 80–100)
MCV RBC AUTO: 93.4 FL — SIGNIFICANT CHANGE UP (ref 80–100)
MONOCYTES # BLD AUTO: 0.08 K/UL — SIGNIFICANT CHANGE UP (ref 0–0.9)
MONOCYTES NFR BLD AUTO: 1.8 % — LOW (ref 2–14)
NEUTROPHILS # BLD AUTO: 4.12 K/UL — SIGNIFICANT CHANGE UP (ref 1.8–7.4)
NEUTROPHILS NFR BLD AUTO: 86.8 % — HIGH (ref 43–77)
NEUTS BAND # BLD: 3.5 % — SIGNIFICANT CHANGE UP (ref 0–8)
NRBC # BLD: 0 /100 WBCS — SIGNIFICANT CHANGE UP (ref 0–0)
PLAT MORPH BLD: NORMAL — SIGNIFICANT CHANGE UP
PLATELET # BLD AUTO: 74 K/UL — LOW (ref 150–400)
PLATELET # BLD AUTO: 85 K/UL — LOW (ref 150–400)
POIKILOCYTOSIS BLD QL AUTO: SLIGHT — SIGNIFICANT CHANGE UP
POTASSIUM SERPL-MCNC: 4 MMOL/L — SIGNIFICANT CHANGE UP (ref 3.5–5.3)
POTASSIUM SERPL-SCNC: 4 MMOL/L — SIGNIFICANT CHANGE UP (ref 3.5–5.3)
PROT SERPL-MCNC: 6.1 G/DL — SIGNIFICANT CHANGE UP (ref 6–8.3)
PROTHROM AB SERPL-ACNC: 12.7 SEC — SIGNIFICANT CHANGE UP (ref 10–12.9)
RBC # BLD: 3.16 M/UL — LOW (ref 3.8–5.2)
RBC # BLD: 3.33 M/UL — LOW (ref 3.8–5.2)
RBC # FLD: 15.3 % — HIGH (ref 10.3–14.5)
RBC # FLD: 15.7 % — HIGH (ref 10.3–14.5)
RBC BLD AUTO: SIGNIFICANT CHANGE UP
RH IG SCN BLD-IMP: POSITIVE — SIGNIFICANT CHANGE UP
RH IG SCN BLD-IMP: POSITIVE — SIGNIFICANT CHANGE UP
SODIUM SERPL-SCNC: 133 MMOL/L — LOW (ref 135–145)
WBC # BLD: 4.17 K/UL — SIGNIFICANT CHANGE UP (ref 3.8–10.5)
WBC # BLD: 4.56 K/UL — SIGNIFICANT CHANGE UP (ref 3.8–10.5)
WBC # FLD AUTO: 4.17 K/UL — SIGNIFICANT CHANGE UP (ref 3.8–10.5)
WBC # FLD AUTO: 4.56 K/UL — SIGNIFICANT CHANGE UP (ref 3.8–10.5)

## 2020-02-26 PROCEDURE — 73562 X-RAY EXAM OF KNEE 3: CPT | Mod: 26,50

## 2020-02-26 PROCEDURE — 76377 3D RENDER W/INTRP POSTPROCES: CPT | Mod: 26

## 2020-02-26 PROCEDURE — 73522 X-RAY EXAM HIPS BI 3-4 VIEWS: CPT | Mod: 26

## 2020-02-26 PROCEDURE — 73700 CT LOWER EXTREMITY W/O DYE: CPT | Mod: 26,RT

## 2020-02-26 PROCEDURE — 73552 X-RAY EXAM OF FEMUR 2/>: CPT | Mod: 26,50

## 2020-02-26 PROCEDURE — 71045 X-RAY EXAM CHEST 1 VIEW: CPT | Mod: 26

## 2020-02-26 RX ORDER — FENTANYL CITRATE 50 UG/ML
1 INJECTION INTRAVENOUS
Refills: 0 | Status: DISCONTINUED | OUTPATIENT
Start: 2020-02-26 | End: 2020-02-26

## 2020-02-26 RX ORDER — HEPARIN SODIUM 5000 [USP'U]/ML
INJECTION INTRAVENOUS; SUBCUTANEOUS
Qty: 25000 | Refills: 0 | Status: DISCONTINUED | OUTPATIENT
Start: 2020-02-26 | End: 2020-02-27

## 2020-02-26 RX ORDER — SODIUM CHLORIDE 9 MG/ML
1000 INJECTION INTRAMUSCULAR; INTRAVENOUS; SUBCUTANEOUS
Refills: 0 | Status: DISCONTINUED | OUTPATIENT
Start: 2020-02-26 | End: 2020-02-28

## 2020-02-26 RX ORDER — HEPARIN SODIUM 5000 [USP'U]/ML
5000 INJECTION INTRAVENOUS; SUBCUTANEOUS EVERY 6 HOURS
Refills: 0 | Status: DISCONTINUED | OUTPATIENT
Start: 2020-02-26 | End: 2020-02-26

## 2020-02-26 RX ORDER — METOCLOPRAMIDE HCL 10 MG
10 TABLET ORAL EVERY 4 HOURS
Refills: 0 | Status: DISCONTINUED | OUTPATIENT
Start: 2020-02-26 | End: 2020-02-26

## 2020-02-26 RX ORDER — FENTANYL CITRATE 50 UG/ML
1 INJECTION INTRAVENOUS
Qty: 0 | Refills: 0 | DISCHARGE

## 2020-02-26 RX ORDER — SODIUM CHLORIDE 9 MG/ML
1000 INJECTION INTRAMUSCULAR; INTRAVENOUS; SUBCUTANEOUS
Refills: 0 | Status: DISCONTINUED | OUTPATIENT
Start: 2020-02-26 | End: 2020-02-26

## 2020-02-26 RX ORDER — ESCITALOPRAM OXALATE 10 MG/1
20 TABLET, FILM COATED ORAL DAILY
Refills: 0 | Status: DISCONTINUED | OUTPATIENT
Start: 2020-02-26 | End: 2020-03-03

## 2020-02-26 RX ORDER — ACETAMINOPHEN 500 MG
975 TABLET ORAL EVERY 8 HOURS
Refills: 0 | Status: DISCONTINUED | OUTPATIENT
Start: 2020-02-26 | End: 2020-02-28

## 2020-02-26 RX ORDER — MORPHINE SULFATE 50 MG/1
1 CAPSULE, EXTENDED RELEASE ORAL EVERY 4 HOURS
Refills: 0 | Status: DISCONTINUED | OUTPATIENT
Start: 2020-02-26 | End: 2020-02-26

## 2020-02-26 RX ORDER — OXYCODONE HYDROCHLORIDE 5 MG/1
1 TABLET ORAL
Qty: 0 | Refills: 0 | DISCHARGE

## 2020-02-26 RX ORDER — OXYCODONE HYDROCHLORIDE 5 MG/1
5 TABLET ORAL EVERY 4 HOURS
Refills: 0 | Status: DISCONTINUED | OUTPATIENT
Start: 2020-02-26 | End: 2020-02-26

## 2020-02-26 RX ORDER — ESCITALOPRAM OXALATE 10 MG/1
20 TABLET, FILM COATED ORAL ONCE
Refills: 0 | Status: COMPLETED | OUTPATIENT
Start: 2020-02-26 | End: 2020-02-26

## 2020-02-26 RX ORDER — ESCITALOPRAM OXALATE 10 MG/1
1 TABLET, FILM COATED ORAL
Qty: 0 | Refills: 0 | DISCHARGE

## 2020-02-26 RX ORDER — ACYCLOVIR SODIUM 500 MG
400 VIAL (EA) INTRAVENOUS
Refills: 0 | Status: DISCONTINUED | OUTPATIENT
Start: 2020-02-26 | End: 2020-03-03

## 2020-02-26 RX ORDER — OXYCODONE HYDROCHLORIDE 5 MG/1
30 TABLET ORAL EVERY 4 HOURS
Refills: 0 | Status: DISCONTINUED | OUTPATIENT
Start: 2020-02-26 | End: 2020-03-03

## 2020-02-26 RX ORDER — HEPARIN SODIUM 5000 [USP'U]/ML
2500 INJECTION INTRAVENOUS; SUBCUTANEOUS EVERY 6 HOURS
Refills: 0 | Status: DISCONTINUED | OUTPATIENT
Start: 2020-02-26 | End: 2020-02-26

## 2020-02-26 RX ORDER — OXYCODONE HYDROCHLORIDE 5 MG/1
20 TABLET ORAL
Refills: 0 | Status: DISCONTINUED | OUTPATIENT
Start: 2020-02-26 | End: 2020-02-28

## 2020-02-26 RX ORDER — ACYCLOVIR SODIUM 500 MG
1 VIAL (EA) INTRAVENOUS
Qty: 0 | Refills: 0 | DISCHARGE

## 2020-02-26 RX ORDER — FENTANYL CITRATE 50 UG/ML
1 INJECTION INTRAVENOUS
Refills: 0 | Status: DISCONTINUED | OUTPATIENT
Start: 2020-02-26 | End: 2020-02-28

## 2020-02-26 RX ORDER — OXYCODONE HYDROCHLORIDE 5 MG/1
2.5 TABLET ORAL EVERY 4 HOURS
Refills: 0 | Status: DISCONTINUED | OUTPATIENT
Start: 2020-02-26 | End: 2020-02-26

## 2020-02-26 RX ORDER — METOCLOPRAMIDE HCL 10 MG
10 TABLET ORAL EVERY 6 HOURS
Refills: 0 | Status: DISCONTINUED | OUTPATIENT
Start: 2020-02-26 | End: 2020-03-03

## 2020-02-26 RX ORDER — HYDROMORPHONE HYDROCHLORIDE 2 MG/ML
1 INJECTION INTRAMUSCULAR; INTRAVENOUS; SUBCUTANEOUS EVERY 4 HOURS
Refills: 0 | Status: DISCONTINUED | OUTPATIENT
Start: 2020-02-26 | End: 2020-02-28

## 2020-02-26 RX ORDER — SENNA PLUS 8.6 MG/1
2 TABLET ORAL AT BEDTIME
Refills: 0 | Status: DISCONTINUED | OUTPATIENT
Start: 2020-02-26 | End: 2020-03-03

## 2020-02-26 RX ORDER — APIXABAN 2.5 MG/1
1 TABLET, FILM COATED ORAL
Qty: 0 | Refills: 0 | DISCHARGE

## 2020-02-26 RX ORDER — HEPARIN SODIUM 5000 [USP'U]/ML
5000 INJECTION INTRAVENOUS; SUBCUTANEOUS ONCE
Refills: 0 | Status: COMPLETED | OUTPATIENT
Start: 2020-02-26 | End: 2020-02-26

## 2020-02-26 RX ADMIN — HEPARIN SODIUM 0 UNIT(S)/HR: 5000 INJECTION INTRAVENOUS; SUBCUTANEOUS at 20:37

## 2020-02-26 RX ADMIN — Medication 975 MILLIGRAM(S): at 21:49

## 2020-02-26 RX ADMIN — HEPARIN SODIUM 5000 UNIT(S): 5000 INJECTION INTRAVENOUS; SUBCUTANEOUS at 13:47

## 2020-02-26 RX ADMIN — HEPARIN SODIUM 1100 UNIT(S)/HR: 5000 INJECTION INTRAVENOUS; SUBCUTANEOUS at 13:51

## 2020-02-26 RX ADMIN — OXYCODONE HYDROCHLORIDE 30 MILLIGRAM(S): 5 TABLET ORAL at 06:12

## 2020-02-26 RX ADMIN — SENNA PLUS 2 TABLET(S): 8.6 TABLET ORAL at 21:50

## 2020-02-26 RX ADMIN — Medication 400 MILLIGRAM(S): at 17:05

## 2020-02-26 RX ADMIN — SODIUM CHLORIDE 125 MILLILITER(S): 9 INJECTION INTRAMUSCULAR; INTRAVENOUS; SUBCUTANEOUS at 04:38

## 2020-02-26 RX ADMIN — OXYCODONE HYDROCHLORIDE 30 MILLIGRAM(S): 5 TABLET ORAL at 17:40

## 2020-02-26 RX ADMIN — OXYCODONE HYDROCHLORIDE 30 MILLIGRAM(S): 5 TABLET ORAL at 07:17

## 2020-02-26 RX ADMIN — FENTANYL CITRATE 1 PATCH: 50 INJECTION INTRAVENOUS at 09:22

## 2020-02-26 RX ADMIN — OXYCODONE HYDROCHLORIDE 30 MILLIGRAM(S): 5 TABLET ORAL at 13:31

## 2020-02-26 RX ADMIN — FENTANYL CITRATE 1 PATCH: 50 INJECTION INTRAVENOUS at 19:26

## 2020-02-26 RX ADMIN — OXYCODONE HYDROCHLORIDE 30 MILLIGRAM(S): 5 TABLET ORAL at 14:01

## 2020-02-26 RX ADMIN — Medication 975 MILLIGRAM(S): at 07:17

## 2020-02-26 RX ADMIN — OXYCODONE HYDROCHLORIDE 30 MILLIGRAM(S): 5 TABLET ORAL at 22:16

## 2020-02-26 RX ADMIN — FENTANYL CITRATE 1 PATCH: 50 INJECTION INTRAVENOUS at 09:23

## 2020-02-26 RX ADMIN — OXYCODONE HYDROCHLORIDE 30 MILLIGRAM(S): 5 TABLET ORAL at 22:46

## 2020-02-26 RX ADMIN — HEPARIN SODIUM 900 UNIT(S)/HR: 5000 INJECTION INTRAVENOUS; SUBCUTANEOUS at 21:49

## 2020-02-26 RX ADMIN — Medication 975 MILLIGRAM(S): at 06:17

## 2020-02-26 RX ADMIN — ESCITALOPRAM OXALATE 20 MILLIGRAM(S): 10 TABLET, FILM COATED ORAL at 17:05

## 2020-02-26 RX ADMIN — OXYCODONE HYDROCHLORIDE 30 MILLIGRAM(S): 5 TABLET ORAL at 17:08

## 2020-02-26 RX ADMIN — Medication 975 MILLIGRAM(S): at 22:14

## 2020-02-26 RX ADMIN — Medication 10 MILLIGRAM(S): at 13:47

## 2020-02-26 NOTE — ED PROVIDER NOTE - CLINICAL SUMMARY MEDICAL DECISION MAKING FREE TEXT BOX
Attending Reid:    80 y/o f hx of MM, found to have femur frx, comminuted at o[facility], pain currently controlled, sent to our ed, for ortho consult as well as fu w/ our onc team in pt. afebrile vitals stable, nv intact. discussed w/ ortho who requested repeat x rays. admitted to their service .

## 2020-02-26 NOTE — H&P ADULT - HISTORY OF PRESENT ILLNESS
79y Female PMHx multiple myeloma s/p L femur ORIF for pathologic fracture (5 years ago, OSH), R proximal femur replacement (Dr. Ernandez) presents s/p twisting injury c/o severe R knee pain and inability to ambulate. Patient initially presented to [facility] where she was found to have a R distal femur fracture and was transferred to [facility] for surgical management. Patient denies headstrike or LOC. Patient denies radiation of pain. Patient denies numbness/tingling/burning in the RLE. No other bone/joint complaints. Patient is a community ambulator at baseline with/without assistive devices.    PAST MEDICAL & SURGICAL HISTORY:    MEDICATIONS  (STANDING):    MEDICATIONS  (PRN):    Allergies    Drug Allergies Not Recorded  gadolinium (Rash)    Intolerances                    T(C): 36.6 (02-26-20 @ 00:25), Max: 36.7 (02-25-20 @ 23:39)  HR: 82 (02-26-20 @ 00:25) (76 - 82)  BP: 154/81 (02-26-20 @ 00:25) (140/61 - 154/81)  RR: 19 (02-26-20 @ 00:25) (16 - 19)  SpO2: 96% (02-26-20 @ 00:25) (96% - 96%)  Wt(kg): --    PE   _LE:  Skin intact; No ecchymosis/soft tissue swelling  Compartments soft; + TTP about hip. No TTP to knee/leg/ankle/foot   ROM lmited 2/2 pain   Unable to SLR; + Log Roll/Heel Strike  Motor intact GS/TA/FHL/EHL  SILT L2-S1  DP/PT pulses 2+    _LE/BUE:   No bony TTP; Good ROM w/o pain; Exam Unremarkable    Imaging:  XR demonstrating _ femoral neck/intertroch/subtroch fracture    79y Female with femoral neck intertroch subtroch fracture  - Pain control  - NPO/IVF  - Martinez catheter  - CBC/BMP/Coags/UA/T+S x2  - EKG/CXR  - Medical clearance  - Plan for OR for ORIF 79y Female PMHx multiple myeloma s/p L femur ORIF for pathologic fracture (5 years ago, OSH), R restoration modular jacques hip arthroplasty replacement (Dr. Ernandez) presents s/p twisting injury c/o severe R knee pain and inability to ambulate. Patient initially presented to [facility] where she was found to have a R distal femur fracture and was transferred to [facility] for surgical management. Patient denies headstrike or LOC. Patient denies radiation of pain. Patient denies numbness/tingling/burning in the RLE. No other bone/joint complaints. Patient is a community ambulator at baseline with/without assistive devices.    PAST MEDICAL & SURGICAL HISTORY:    MEDICATIONS  (STANDING):    MEDICATIONS  (PRN):    Allergies    Drug Allergies Not Recorded  gadolinium (Rash)    Intolerances                    T(C): 36.6 (02-26-20 @ 00:25), Max: 36.7 (02-25-20 @ 23:39)  HR: 82 (02-26-20 @ 00:25) (76 - 82)  BP: 154/81 (02-26-20 @ 00:25) (140/61 - 154/81)  RR: 19 (02-26-20 @ 00:25) (16 - 19)  SpO2: 96% (02-26-20 @ 00:25) (96% - 96%)  Wt(kg): --    PE   _LE:  Skin intact; No ecchymosis/soft tissue swelling  Compartments soft; + TTP about hip. No TTP to knee/leg/ankle/foot   ROM lmited 2/2 pain   Unable to SLR; + Log Roll/Heel Strike  Motor intact GS/TA/FHL/EHL  SILT L2-S1  DP/PT pulses 2+    _LE/BUE:   No bony TTP; Good ROM w/o pain; Exam Unremarkable    Imaging:  XR demonstrating _ femoral neck/intertroch/subtroch fracture    79y Female with femoral neck intertroch subtroch fracture  - Pain control  - NPO/IVF  - Martinez catheter  - CBC/BMP/Coags/UA/T+S x2  - EKG/CXR  - Medical clearance  - Plan for OR for ORIF

## 2020-02-26 NOTE — ED PROVIDER NOTE - ATTENDING CONTRIBUTION TO CARE
MD Mathews:  patient seen and evaluated personally.   I agree with the History & Physical,  Impression & Plan other than what was detailed in my note.  MD Mathews    see mdm

## 2020-02-26 NOTE — CONSULT NOTE ADULT - ASSESSMENT
80 yo womn with a hx of MM presents with a pathological fx of the right femur. Patient is scheduled for Open reduction and internal fixation of femur

## 2020-02-26 NOTE — ED PROVIDER NOTE - OBJECTIVE STATEMENT
78 yo female with PMHx of MM on chemo p/w RLE pain.  Patient reports that she has had right knee pain for the past 5 days.  Today she twisted to get into her wheelchair and had severe right knee pain and felt a "crack." Patient was unable to ambulate.   Called EMS and was taken to [facility] where she was diagnosed with a distal femur fracture.  Patient transferred to [facility] for orthopedics consult  Ortho: Dr. GRIS Ernandez

## 2020-02-26 NOTE — ED PROVIDER NOTE - PHYSICAL EXAMINATION
Gen: AAO x 3, NAD  Skin: No rashes or lesions  HEENT: NC/AT, PERRLA, EOMI, MMM  Resp: unlabored CTAB  Cardiac: rrr s1s2, no murmurs, rubs or gallops  GI: ND, +BS, Soft, NT  Ext: RLE in knee immobilizer with bulky dressing, distal sensation and pulses intact.  Neuro: no focal deficits

## 2020-02-26 NOTE — ED ADULT NURSE NOTE - OBJECTIVE STATEMENT
Patient presented to ED via ambulance from [facility] c/o right femur fracture. Patient denies fall or injury but stating she twisted her right leg 5 days ago and pain started, getting worse and unable to put weight on her leg. Patient went yesterday to [facility]. Received Toradol and oxy at [facility] and came with a patent left PIV # 22. Patient also came with her right leg bandaged and with a leg immobilizer. Skin intact. Son at bedside for a while then he went home. Patient walks usually with a cane or walker at home. Pain icreased with movement and reposition. Patient presented to ED via ambulance from [facility] c/o right femur fracture. Patient denies fall or injury but stating she twisted her right leg 5 days ago and pain started, getting worse and unable to put weight on her leg. Patient went yesterday to [facility]. Received Toradol and oxy at [facility] and came with a patent left PIV # 22. Patient also came with her right leg bandaged and with a leg immobilizer. Patient also has a [facility] Mediport for Chemo. Patient stating when accessed infusing well but unable to obtained blood. Skin intact. Patient has Multiple MYeloma and stating she is in a great deal of generalized pain all the time, takes 30 mg of Oxycodone am and pm and Fentanyl Patch 150 mg every 72 hours. Patient had (2) Fentanyl Patches on Right upper arm (100 mg and 50 mg each) the patches are due to removed today. Son at bedside for a while then he went home. Patient walks usually with a cane or walker at home, but lately she was unable to walk because of the right leg pain. Right leg pain increases with movement and reposition.

## 2020-02-26 NOTE — CONSULT NOTE ADULT - SUBJECTIVE AND OBJECTIVE BOX
80 yo female with PMHx of MM on chemo p/w RLE pain.  Patient reports that she has had right knee pain for the past 5 days.  Today she twisted to get into her wheelchair and had severe right knee pain and felt a "crack." Patient was unable to ambulate. Called EMS and was taken to [facility] where she was diagnosed with a distal femur fracture.  Patient transferred to [facility] for orthopedics consult. Patient found to have a distal right femur fx and is scheduled for Open reduction and internal fixation. Patient seen with continue pain at fx site      PAST MEDICAL & SURGICAL HISTORY:  Multiple myloma  right femur Open reduction and internal fixation  left THR      MEDICATIONS  (STANDING):  acetaminophen   Tablet .. 975 milliGRAM(s) Oral every 8 hours  acyclovir   Oral Tab/Cap 400 milliGRAM(s) Oral two times a day  escitalopram 20 milliGRAM(s) Oral daily  escitalopram 20 milliGRAM(s) Oral once  fentaNYL   Patch  75 MICROgram(s)/Hr 1 Patch Transdermal every 72 hours  fentaNYL   Patch  75 MICROgram(s)/Hr 1 Patch Transdermal every 72 hours  senna 2 Tablet(s) Oral at bedtime    MEDICATIONS  (PRN):  HYDROmorphone  Injectable 1 milliGRAM(s) IV Push every 4 hours PRN Severe Pain (7 - 10)  oxyCODONE    IR 30 milliGRAM(s) Oral every 4 hours PRN Severe Pain (7 - 10)  oxyCODONE    IR 20 milliGRAM(s) Oral every 3 hours PRN Moderate Pain (4 - 6)    Social Hx:  Tobacco: neg  ETOH: neg  Drugs: neg    Family Hx:  Brother with malignant hyperthermia after anesthesia     ROS  CONSTITUTIONAL: No weakness, fevers or chills  EYES/ENT: No visual changes;  No vertigo or throat pain   NECK: No pain or stiffness  RESPIRATORY: No cough, wheezing, hemoptysis; No shortness of breath  CARDIOVASCULAR: No chest pain or palpitations  GASTROINTESTINAL: No abdominal or epigastric pain. No nausea, vomiting, or hematemesis; No diarrhea or constipation. No melena or hematochezia.  GENITOURINARY: No dysuria, frequency or hematuria  NEUROLOGICAL: No numbness or weakness  SKIN: No itching, burning, rashes, or lesions   MUSCULOSKELETAL: right leg pain    INTERVAL HPI/OVERNIGHT EVENTS:  T(C): 37 (02-26-20 @ 12:12), Max: 37.1 (02-26-20 @ 05:44)  HR: 70 (02-26-20 @ 12:12) (70 - 82)  BP: 103/63 (02-26-20 @ 12:12) (103/63 - 154/81)  RR: 18 (02-26-20 @ 12:12) (16 - 19)  SpO2: 95% (02-26-20 @ 12:12) (93% - 96%)  Wt(kg): --  I&O's Summary    26 Feb 2020 07:01  -  26 Feb 2020 13:14  --------------------------------------------------------  IN: 0 mL / OUT: 500 mL / NET: -500 mL        PHYSICAL EXAM:  GENERAL: NAD, well-groomed, well-developed  HEAD:  Atraumatic, Normocephalic  EYES: EOMI, PERRLA, conjunctiva and sclera clear  ENMT: No tonsillar erythema, exudates, or enlargement; Moist mucous membranes, Good dentition, No lesions  NECK: Supple, No JVD, Normal thyroid  NERVOUS SYSTEM:  Alert & Oriented X3, Good concentration; Motor Strength 5/5 B/L upper and lower extremities; DTRs 2+ intact and symmetric  CHEST/LUNG: Clear to percussion bilaterally; No rales, rhonchi, wheezing, or rubs  HEART: Regular rate and rhythm; No murmurs, rubs, or gallops  ABDOMEN: Soft, Nontender, Nondistended; Bowel sounds present  EXTREMITIES:  external rotation of right LE with edema  LYMPH: No lymphadenopathy noted  SKIN: No rashes or lesions        LABS:                        9.9    4.56  )-----------( 85       ( 26 Feb 2020 02:49 )             30.5     02-26    133<L>  |  94<L>  |  20  ----------------------------<  142<H>  4.0   |  27  |  0.59    Ca    8.9      26 Feb 2020 02:49    TPro  6.1  /  Alb  4.0  /  TBili  0.5  /  DBili  x   /  AST  23  /  ALT  14  /  AlkPhos  44  02-26    PT/INR - ( 26 Feb 2020 02:49 )   PT: 12.7 sec;   INR: 1.11 ratio         PTT - ( 26 Feb 2020 02:49 )  PTT:29.4 sec    EKG:    < from: 12 Lead ECG (02.26.20 @ 02:29) >    Ventricular Rate 81 BPM    Atrial Rate 81 BPM    P-R Interval 172 ms    QRS Duration 94 ms    Q-T Interval 440 ms    QTC Calculation(Bezet) 511 ms    P Axis 56 degrees    R Axis 50 degrees    T Axis 63 degrees    Diagnosis Line NORMAL SINUS RHYTHM  PROLONGED QT  ABNORMAL ECG    < end of copied text >              Radiology reports:

## 2020-02-26 NOTE — PROVIDER CONTACT NOTE (CRITICAL VALUE NOTIFICATION) - ACTION/TREATMENT ORDERED:
covering physician made aware nomogram scanned. Will stop infusion for 60 min and restart at a new rate of 9ml/hr. Will continue to monitor.

## 2020-02-26 NOTE — CONSULT NOTE ADULT - PROBLEM SELECTOR RECOMMENDATION 9
scheduled for Open reduction and internal fixation of femur  No contraindication to scheduled procedure  DVT and GI prophylaxis

## 2020-02-27 ENCOUNTER — TRANSCRIPTION ENCOUNTER (OUTPATIENT)
Age: 80
End: 2020-02-27

## 2020-02-27 DIAGNOSIS — M84.553A: ICD-10-CM

## 2020-02-27 DIAGNOSIS — C90.00 MULTIPLE MYELOMA NOT HAVING ACHIEVED REMISSION: ICD-10-CM

## 2020-02-27 DIAGNOSIS — Z51.5 ENCOUNTER FOR PALLIATIVE CARE: ICD-10-CM

## 2020-02-27 LAB
ANION GAP SERPL CALC-SCNC: 10 MMOL/L — SIGNIFICANT CHANGE UP (ref 5–17)
APTT BLD: 109.4 SEC — HIGH (ref 27.5–36.3)
APTT BLD: 29.9 SEC — SIGNIFICANT CHANGE UP (ref 27.5–36.3)
BLD GP AB SCN SERPL QL: NEGATIVE — SIGNIFICANT CHANGE UP
BUN SERPL-MCNC: 16 MG/DL — SIGNIFICANT CHANGE UP (ref 7–23)
CALCIUM SERPL-MCNC: 8.4 MG/DL — SIGNIFICANT CHANGE UP (ref 8.4–10.5)
CHLORIDE SERPL-SCNC: 101 MMOL/L — SIGNIFICANT CHANGE UP (ref 96–108)
CO2 SERPL-SCNC: 26 MMOL/L — SIGNIFICANT CHANGE UP (ref 22–31)
CREAT SERPL-MCNC: 0.67 MG/DL — SIGNIFICANT CHANGE UP (ref 0.5–1.3)
GLUCOSE SERPL-MCNC: 115 MG/DL — HIGH (ref 70–99)
HCT VFR BLD CALC: 29.4 % — LOW (ref 34.5–45)
HCT VFR BLD CALC: 32.5 % — LOW (ref 34.5–45)
HEPARIN-PF4 AB RESULT: <0.6 U/ML — SIGNIFICANT CHANGE UP (ref 0–0.9)
HGB BLD-MCNC: 10.3 G/DL — LOW (ref 11.5–15.5)
HGB BLD-MCNC: 9.2 G/DL — LOW (ref 11.5–15.5)
INR BLD: 1.15 RATIO — SIGNIFICANT CHANGE UP (ref 0.88–1.16)
INR BLD: 1.19 RATIO — HIGH (ref 0.88–1.16)
MCHC RBC-ENTMCNC: 29.6 PG — SIGNIFICANT CHANGE UP (ref 27–34)
MCHC RBC-ENTMCNC: 30.1 PG — SIGNIFICANT CHANGE UP (ref 27–34)
MCHC RBC-ENTMCNC: 31.3 GM/DL — LOW (ref 32–36)
MCHC RBC-ENTMCNC: 31.7 GM/DL — LOW (ref 32–36)
MCV RBC AUTO: 93.4 FL — SIGNIFICANT CHANGE UP (ref 80–100)
MCV RBC AUTO: 96.1 FL — SIGNIFICANT CHANGE UP (ref 80–100)
NRBC # BLD: 0 /100 WBCS — SIGNIFICANT CHANGE UP (ref 0–0)
NRBC # BLD: 0 /100 WBCS — SIGNIFICANT CHANGE UP (ref 0–0)
PF4 HEPARIN CMPLX AB SER-ACNC: NEGATIVE — SIGNIFICANT CHANGE UP
PLATELET # BLD AUTO: 65 K/UL — LOW (ref 150–400)
PLATELET # BLD AUTO: 76 K/UL — LOW (ref 150–400)
POTASSIUM SERPL-MCNC: 4.2 MMOL/L — SIGNIFICANT CHANGE UP (ref 3.5–5.3)
POTASSIUM SERPL-SCNC: 4.2 MMOL/L — SIGNIFICANT CHANGE UP (ref 3.5–5.3)
PROTHROM AB SERPL-ACNC: 13.2 SEC — HIGH (ref 10–12.9)
PROTHROM AB SERPL-ACNC: 13.7 SEC — HIGH (ref 10–12.9)
RBC # BLD: 3.06 M/UL — LOW (ref 3.8–5.2)
RBC # BLD: 3.48 M/UL — LOW (ref 3.8–5.2)
RBC # FLD: 15.6 % — HIGH (ref 10.3–14.5)
RBC # FLD: 16 % — HIGH (ref 10.3–14.5)
RH IG SCN BLD-IMP: POSITIVE — SIGNIFICANT CHANGE UP
SODIUM SERPL-SCNC: 137 MMOL/L — SIGNIFICANT CHANGE UP (ref 135–145)
WBC # BLD: 3.76 K/UL — LOW (ref 3.8–10.5)
WBC # BLD: 4.2 K/UL — SIGNIFICANT CHANGE UP (ref 3.8–10.5)
WBC # FLD AUTO: 3.76 K/UL — LOW (ref 3.8–10.5)
WBC # FLD AUTO: 4.2 K/UL — SIGNIFICANT CHANGE UP (ref 3.8–10.5)

## 2020-02-27 PROCEDURE — 99223 1ST HOSP IP/OBS HIGH 75: CPT

## 2020-02-27 RX ORDER — CALCIUM CARBONATE 500(1250)
1 TABLET ORAL EVERY 6 HOURS
Refills: 0 | Status: DISCONTINUED | OUTPATIENT
Start: 2020-02-27 | End: 2020-03-03

## 2020-02-27 RX ADMIN — HEPARIN SODIUM 800 UNIT(S)/HR: 5000 INJECTION INTRAVENOUS; SUBCUTANEOUS at 05:04

## 2020-02-27 RX ADMIN — FENTANYL CITRATE 1 PATCH: 50 INJECTION INTRAVENOUS at 07:07

## 2020-02-27 RX ADMIN — Medication 400 MILLIGRAM(S): at 05:06

## 2020-02-27 RX ADMIN — FENTANYL CITRATE 1 PATCH: 50 INJECTION INTRAVENOUS at 19:25

## 2020-02-27 RX ADMIN — OXYCODONE HYDROCHLORIDE 30 MILLIGRAM(S): 5 TABLET ORAL at 08:31

## 2020-02-27 RX ADMIN — OXYCODONE HYDROCHLORIDE 30 MILLIGRAM(S): 5 TABLET ORAL at 23:35

## 2020-02-27 RX ADMIN — OXYCODONE HYDROCHLORIDE 30 MILLIGRAM(S): 5 TABLET ORAL at 04:03

## 2020-02-27 RX ADMIN — Medication 975 MILLIGRAM(S): at 22:03

## 2020-02-27 RX ADMIN — SENNA PLUS 2 TABLET(S): 8.6 TABLET ORAL at 22:03

## 2020-02-27 RX ADMIN — Medication 400 MILLIGRAM(S): at 18:04

## 2020-02-27 RX ADMIN — OXYCODONE HYDROCHLORIDE 30 MILLIGRAM(S): 5 TABLET ORAL at 17:39

## 2020-02-27 RX ADMIN — Medication 975 MILLIGRAM(S): at 22:33

## 2020-02-27 RX ADMIN — OXYCODONE HYDROCHLORIDE 30 MILLIGRAM(S): 5 TABLET ORAL at 17:09

## 2020-02-27 RX ADMIN — ESCITALOPRAM OXALATE 20 MILLIGRAM(S): 10 TABLET, FILM COATED ORAL at 05:09

## 2020-02-27 RX ADMIN — SODIUM CHLORIDE 125 MILLILITER(S): 9 INJECTION INTRAMUSCULAR; INTRAVENOUS; SUBCUTANEOUS at 08:19

## 2020-02-27 RX ADMIN — OXYCODONE HYDROCHLORIDE 30 MILLIGRAM(S): 5 TABLET ORAL at 09:01

## 2020-02-27 RX ADMIN — OXYCODONE HYDROCHLORIDE 30 MILLIGRAM(S): 5 TABLET ORAL at 03:33

## 2020-02-27 RX ADMIN — SODIUM CHLORIDE 125 MILLILITER(S): 9 INJECTION INTRAMUSCULAR; INTRAVENOUS; SUBCUTANEOUS at 00:59

## 2020-02-27 NOTE — PROGRESS NOTE ADULT - SUBJECTIVE AND OBJECTIVE BOX
Patient is a 79y old  Female who presents with a chief complaint of pathologic fracture         MEDICATIONS  (STANDING):  acetaminophen   Tablet .. 975 milliGRAM(s) Oral every 8 hours  acyclovir   Oral Tab/Cap 400 milliGRAM(s) Oral two times a day  escitalopram 20 milliGRAM(s) Oral daily  fentaNYL   Patch  75 MICROgram(s)/Hr 1 Patch Transdermal every 72 hours  fentaNYL   Patch  75 MICROgram(s)/Hr 1 Patch Transdermal every 72 hours  senna 2 Tablet(s) Oral at bedtime  sodium chloride 0.9%. 1000 milliLiter(s) (125 mL/Hr) IV Continuous <Continuous>    MEDICATIONS  (PRN):  calcium carbonate    500 mG (Tums) Chewable 1 Tablet(s) Chew every 6 hours PRN Heartburn  HYDROmorphone  Injectable 1 milliGRAM(s) IV Push every 4 hours PRN Severe Pain (7 - 10)  metoclopramide Injectable 10 milliGRAM(s) IV Push every 6 hours PRN nausa or vomiting  oxyCODONE    IR 30 milliGRAM(s) Oral every 4 hours PRN Severe Pain (7 - 10)  oxyCODONE    IR 20 milliGRAM(s) Oral every 3 hours PRN Moderate Pain (4 - 6)      Allergies    gadolinium (Rash)  No Known Drug Allergies    VITALS:   T(C): 37.2 (02-28-20 @ 09:44), Max: 37.7 (02-27-20 @ 15:45)  HR: 74 (02-28-20 @ 09:44) (73 - 87)  BP: 115/70 (02-28-20 @ 09:44) (115/70 - 164/96)  RR: 18 (02-28-20 @ 09:44) (18 - 18)  SpO2: 96% (02-28-20 @ 09:44) (95% - 98%)  Wt(kg): --    PHYSICAL EXAM:  GENERAL: NAD, well nourished and conversant  HEAD:  Atraumatic  EYES: EOM, PERRLA, conjunctiva pink and sclera white  ENT: No tonsillar erythema, exudates, or enlargement, moist mucous membranes, good dentition, no lesions  NECK: Supple, No JVD, normal thyroid, carotids with normal upstrokes and no bruits  CHEST/LUNG: Clear to auscultation bilaterally, No rales, rhonchi, wheezing, or rubs  HEART: Regular rate and rhythm, No murmurs, rubs, or gallops  ABDOMEN: Soft, nondistended, no masses, guarding, tenderness or rebound, bowel sounds present  EXTREMITIES:  2+ Peripheral Pulses, No clubbing, cyanosis, or edema. No arthritis of shoulders, elbows, hands, hips, knees, ankles, or feet. No DJD C spine, T spine, or L/S spine  LYMPH: No lymphadenopathy noted  SKIN: No rashes or lesions  NERVOUS SYSTEM:  Alert & Oriented X3, normal cognitive function. Motor Strength 5/5 right upper and right lower.  5/5 left upper and left lower extremities, DTRs 2+ intact and symmetric    LABS:        CBC Full  -  ( 28 Feb 2020 07:27 )  WBC Count : 4.35 K/uL  RBC Count : 3.45 M/uL  Hemoglobin : 10.2 g/dL  Hematocrit : 32.4 %  Platelet Count - Automated : 108 K/uL  Mean Cell Volume : 93.9 fl  Mean Cell Hemoglobin : 29.6 pg  Mean Cell Hemoglobin Concentration : 31.5 gm/dL  Auto Neutrophil # : x  Auto Lymphocyte # : x  Auto Monocyte # : x  Auto Eosinophil # : x  Auto Basophil # : x  Auto Neutrophil % : x  Auto Lymphocyte % : x  Auto Monocyte % : x  Auto Eosinophil % : x  Auto Basophil % : x    02-28    132<L>  |  100  |  10  ----------------------------<  103<H>  3.6   |  21<L>  |  0.49<L>    Ca    8.1<L>      28 Feb 2020 07:26    TPro  5.3<L>  /  Alb  3.1<L>  /  TBili  0.4  /  DBili  x   /  AST  12  /  ALT  10  /  AlkPhos  45  02-28    LIVER FUNCTIONS - ( 28 Feb 2020 07:26 )  Alb: 3.1 g/dL / Pro: 5.3 g/dL / ALK PHOS: 45 U/L / ALT: 10 U/L / AST: 12 U/L / GGT: x           PT/INR - ( 28 Feb 2020 08:56 )   PT: 13.0 sec;   INR: 1.15 ratio         PTT - ( 28 Feb 2020 08:56 )  PTT:29.8 sec    CAPILLARY BLOOD GLUCOSE          RADIOLOGY & ADDITIONAL TESTS:      Consultant(s):    Care Discussed with Consultants/Other Providers [ ] YES  [ ] NO Patient is a 79y old  Female who presents with a chief complaint of pathologic fracture.  PMHx of MM on chemo p/w RLE pain.  Patient reports that she has had right knee pain for the past 5 days.  Today she twisted to get into her wheelchair and had severe right knee pain and felt a "crack." Patient was unable to ambulate. Called EMS and was taken to [facility] where she was diagnosed with a distal femur fracture.  Patient transferred to [facility] for orthopedics consult. Patient found to have a distal right femur fx and is scheduled for Open reduction and internal fixation. Patient seen with continue pain at fx site and seen by palliative care for pre-op pain control.    MEDICATIONS  (STANDING):  acetaminophen   Tablet .. 975 milliGRAM(s) Oral every 8 hours  acyclovir   Oral Tab/Cap 400 milliGRAM(s) Oral two times a day  escitalopram 20 milliGRAM(s) Oral daily  fentaNYL   Patch  75 MICROgram(s)/Hr 1 Patch Transdermal every 72 hours  fentaNYL   Patch  75 MICROgram(s)/Hr 1 Patch Transdermal every 72 hours  senna 2 Tablet(s) Oral at bedtime  sodium chloride 0.9%. 1000 milliLiter(s) (125 mL/Hr) IV Continuous <Continuous>    MEDICATIONS  (PRN):  calcium carbonate    500 mG (Tums) Chewable 1 Tablet(s) Chew every 6 hours PRN Heartburn  HYDROmorphone  Injectable 1 milliGRAM(s) IV Push every 4 hours PRN Severe Pain (7 - 10)  metoclopramide Injectable 10 milliGRAM(s) IV Push every 6 hours PRN nausa or vomiting  oxyCODONE    IR 30 milliGRAM(s) Oral every 4 hours PRN Severe Pain (7 - 10)  oxyCODONE    IR 20 milliGRAM(s) Oral every 3 hours PRN Moderate Pain (4 - 6)      Allergies    gadolinium (Rash)  No Known Drug Allergies    VITALS:   T(C): 37.2 (02-27-20 @ 09:44), Max: 37.7 (02-27-20 @ 15:45)  HR: 74 (02-27-20 @ 09:44) (73 - 87)  BP: 115/70 (02-27-20 @ 09:44) (115/70 - 164/96)  RR: 18 (02-27-20 @ 09:44) (18 - 18)  SpO2: 96% (02-27-20 @ 09:44) (95% - 98%)  Wt(kg): --    PHYSICAL EXAM:  GENERAL: NAD, well nourished and conversant  HEAD:  Atraumatic  EYES: EOM, PERRLA, conjunctiva pink and sclera white  ENT: No tonsillar erythema, exudates, or enlargement, moist mucous membranes, good dentition, no lesions  NECK: Supple, No JVD, normal thyroid, carotids with normal upstrokes and no bruits  CHEST/LUNG: Clear to auscultation bilaterally, No rales, rhonchi, wheezing, or rubs  HEART: Regular rate and rhythm, No murmurs, rubs, or gallops  ABDOMEN: Soft, nondistended, no masses, guarding, tenderness or rebound, bowel sounds present  EXTREMITIES: 4+ right hip swelling and 3+ tenderness to touch. No erythema or incisional drainage.  LYMPH: No lymphadenopathy noted  SKIN: No rashes or lesions  NERVOUS SYSTEM:  Disoriented, with no focal neurologic deficits.  LABS:    CBC Full  -  ( 27 Feb 2020 07:27 )  WBC Count : 4.35 K/uL  RBC Count : 3.45 M/uL  Hemoglobin : 10.2 g/dL  Hematocrit : 32.4 %  Platelet Count - Automated : 108 K/uL  Mean Cell Volume : 93.9 fl  Mean Cell Hemoglobin : 29.6 pg  Mean Cell Hemoglobin Concentration : 31.5 gm/dL  Auto Neutrophil # : x  Auto Lymphocyte # : x  Auto Monocyte # : x  Auto Eosinophil # : x  Auto Basophil # : x  Auto Neutrophil % : x  Auto Lymphocyte % : x  Auto Monocyte % : x  Auto Eosinophil % : x  Auto Basophil % : x    02-27    132<L>  |  100  |  10  ----------------------------<  103<H>  3.6   |  21<L>  |  0.49<L>    Ca    8.1<L>      28 Feb 2020 07:26    TPro  5.3<L>  /  Alb  3.1<L>  /  TBili  0.4  /  DBili  x   /  AST  12  /  ALT  10  /  AlkPhos  45  02-28    LIVER FUNCTIONS - ( 27 Feb 2020 07:26 )  Alb: 3.1 g/dL / Pro: 5.3 g/dL / ALK PHOS: 45 U/L / ALT: 10 U/L / AST: 12 U/L / GGT: x           PT/INR - ( 27 Feb 2020 08:56 )   PT: 13.0 sec;   INR: 1.15 ratio         PTT - ( 27 Feb 2020 08:56 )  PTT:29.8 sec    CAPILLARY BLOOD GLUCOSE          RADIOLOGY & ADDITIONAL TESTS:      Consultant(s):    Care Discussed with Consultants/Other Providers [ ] YES  [ ] NO

## 2020-02-27 NOTE — PROGRESS NOTE ADULT - ASSESSMENT
Patient found to have a distal right femur fx and is scheduled for Open reduction and internal fixation. Patient seen with continue pain at fx site and seen by palliative care for pre-op pain control.

## 2020-02-27 NOTE — CHART NOTE - NSCHARTNOTEFT_GEN_A_CORE
Noted drop in platelets during admission to hospital.  Was on Heparin drip for recent DVT.  D/w Dr. Beard, will order stat EMILY panel, and will discontinue Hgtt.     ROSELYN Davidson PA-C  #[id number]

## 2020-02-27 NOTE — PROGRESS NOTE ADULT - ASSESSMENT
79y Female with femur fracture, plan for ORIF 2/27  - Pain control  - NPO/IVF  - labs reviewed, med cleared  - Plan for OR for ORIF

## 2020-02-27 NOTE — CONSULT NOTE ADULT - PROBLEM SELECTOR RECOMMENDATION 9
- currently on oxy 20mg Q3 PRN moderate, 30mg Q4 PRN severe, and dilaudid 1mg Q4 PRN severe  - on fentanyl 150mcg patch (home dose)  - would continue with above for now, and readdress pain regimen after ORIF - currently on oxy 20mg Q3 PRN moderate, 30mg Q4 PRN severe, and dilaudid 1mg Q4 PRN severe  - used oxy 30mg x 5 in last 24 hours  - on fentanyl 150mcg patch (home dose)  - would continue with above for now, and readdress pain regimen after ORIF - currently on oxy 20mg Q3 PRN moderate, 30mg Q4 PRN severe, and dilaudid 1mg Q4 PRN severe  - used oxy 30mg x 5 in last 24 hours  - on fentanyl 150mcg patch (home dose)  - would continue with above for now, and readdress pain regimen after ORIF  - patient unaware of dilaudid dose, she will try today to evaluate effect on pain, as oxycodone is not as helpful

## 2020-02-27 NOTE — PROGRESS NOTE ADULT - SUBJECTIVE AND OBJECTIVE BOX
79 years old female with a medical history of multiple myeloma. previously underwent right bipolar hip replacement. Presented now with displaced pathological fracture right distal femur suggesting myelomatous lesion, patient was indicated for open reduction internal fixation., Surgical procedure potential complication were discussed.

## 2020-02-27 NOTE — CONSULT NOTE ADULT - ASSESSMENT
79F with PMH of MM (s/p L femoral ORIF for path fracture 2015) here with twisting injury, severe R knee pain, and inability to ambulate. Was found to have a R distal femur fracture and was transferred to [facility] for surgical management. Palliative care called to help with pain management. Patient is scheduled for ORIF.

## 2020-02-27 NOTE — CONSULT NOTE ADULT - SUBJECTIVE AND OBJECTIVE BOX
HPI: 79F with PMH of MM (s/p L femoral ORIF for path fracture 2015) here with twisting injury, severe R knee pain, and inability to ambulate. Was found to have a R distal femur fracture and was transferred to [facility] for surgical management. Palliative care called to help with pain management. Patient is scheduled for ORIF.    PERTINENT PM/SXH:     FAMILY HISTORY:    ITEMS NOT CHECKED ARE NOT PRESENT    SOCIAL HISTORY:   Significant other/partner[ ]  Children[ ]  Presybeterian/Spirituality:  Substance hx:  [ ]   Tobacco hx:  [ ]   Alcohol hx: [ ]   Home Opioid hx:  [ ] I-Stop Reference No: [id number]    Rx Written	Rx Dispensed	Drug	Quantity	Days Supply	Prescriber Name  01/14/2020	01/19/2020	fentanyl 100 mcg/hr patch	10	30	Elif Gandhi  01/14/2020	01/19/2020	fentanyl 50 mcg/hr patch	10	30	Elif Gandhi  01/14/2020	01/15/2020	oxycodone hcl 20 mg tablet	90	30	Elif Gandhi  12/18/2019	12/18/2019	fentanyl 25 mcg/hr patch	10	30	Groysman, Nomi (D O )  12/18/2019	12/18/2019	fentanyl 100 mcg/hr patch	10	30	Groysman, Nomi (D O )  08/28/2019	08/31/2019	oxycodone hcl 10 mg tablet	30	30	Elif Gandhi  08/28/2019	08/31/2019	oxycodone hcl 20 mg tablet	90	15	Elif Gandhi  08/28/2019	08/31/2019	fentanyl 100 mcg/hr patch	10	30	Elif Gandhi  08/28/2019	08/31/2019	fentanyl 25 mcg/hr patch	10	30	Elif Gandhi    Living Situation: [ ]Home  [ ]Long term care  [ ]Rehab [ ]Other  Occupation:     ADVANCE DIRECTIVES:    DNR  Yes  MOLST  [ ]  Living Will  [ ]   DECISION MAKER(s):  [ ] Health Care Proxy(s)  [ ] Surrogate(s)  [ ] Guardian           Name(s): Phone Number(s):    BASELINE (I)ADL(s) (prior to admission):  [place]: [ ]Total  [ ] Moderate [ ]Dependent    Allergies    gadolinium (Rash)  No Known Drug Allergies    Intolerances    MEDICATIONS  (STANDING):  acetaminophen   Tablet .. 975 milliGRAM(s) Oral every 8 hours  acyclovir   Oral Tab/Cap 400 milliGRAM(s) Oral two times a day  escitalopram 20 milliGRAM(s) Oral daily  fentaNYL   Patch  75 MICROgram(s)/Hr 1 Patch Transdermal every 72 hours  fentaNYL   Patch  75 MICROgram(s)/Hr 1 Patch Transdermal every 72 hours  senna 2 Tablet(s) Oral at bedtime  sodium chloride 0.9%. 1000 milliLiter(s) (125 mL/Hr) IV Continuous <Continuous>    MEDICATIONS  (PRN):  calcium carbonate    500 mG (Tums) Chewable 1 Tablet(s) Chew every 6 hours PRN Heartburn  HYDROmorphone  Injectable 1 milliGRAM(s) IV Push every 4 hours PRN Severe Pain (7 - 10)  metoclopramide Injectable 10 milliGRAM(s) IV Push every 6 hours PRN nausa or vomiting  oxyCODONE    IR 30 milliGRAM(s) Oral every 4 hours PRN Severe Pain (7 - 10)  oxyCODONE    IR 20 milliGRAM(s) Oral every 3 hours PRN Moderate Pain (4 - 6)      PRESENT SYMPTOMS: [ ]Unable to obtain due to poor mentation   Source if other than patient:  [ ]Family   [ ]Team     Pain: [ ]yes [ ]no  QOL impact -   Location -                    Aggravating factors -  Quality -  Radiation -  Timing-  Severity (0-10 scale):  Minimal acceptable level (0-10 scale):     PAIN AD Score:     [email or web address].pdf (press ctrl +  left click to view)    Dyspnea:                           [ ]Mild [ ]Moderate [ ]Severe  Anxiety:                             [ ]Mild [ ]Moderate [ ]Severe  Fatigue:                             [ ]Mild [ ]Moderate [ ]Severe  Nausea:                             [ ]Mild [ ]Moderate [ ]Severe  Loss of appetite:              [ ]Mild [ ]Moderate [ ]Severe  Constipation:                    [ ]Mild [ ]Moderate [ ]Severe    Other Symptoms:  [ ]All other review of systems negative     Palliative Performance Status Version 2:         %    http://npcrc.org/files/news/palliative_performance_scale_ppsv2.pdf    PHYSICAL EXAM:  Vital Signs Last 24 Hrs  T(C): 36.9 (27 Feb 2020 07:59), Max: 37.7 (26 Feb 2020 20:20)  T(F): 98.5 (27 Feb 2020 07:59), Max: 99.9 (26 Feb 2020 20:20)  HR: 76 (27 Feb 2020 07:59) (70 - 84)  BP: 150/66 (27 Feb 2020 07:59) (103/63 - 150/66)  BP(mean): --  RR: 18 (27 Feb 2020 07:59) (18 - 20)  SpO2: 97% (27 Feb 2020 07:59) (94% - 99%) I&O's Summary    26 Feb 2020 07:01  -  27 Feb 2020 07:00  --------------------------------------------------------  IN: 990 mL / OUT: 1700 mL / NET: -710 mL    27 Feb 2020 07:01  -  27 Feb 2020 11:16  --------------------------------------------------------  IN: 25 mL / OUT: 0 mL / NET: 25 mL        GENERAL:  [ ]Alert  [ ]Oriented x   [ ]Lethargic  [ ]Cachexia  [ ]Unarousable  [ ]Verbal  [ ]Non-Verbal  Behavioral:   [ ] Normal [ ] Anxiety  [ ] Delirium [ ] Agitation [ ] Other  HEENT:  [ ]Normal   [ ]Dry mouth   [ ]ET Tube/Trach  [ ]Oral lesions  PULMONARY:   [ ]Clear [ ]Tachypnea  [ ]Audible excessive secretions [ ] No labored breathing  [ ]Rhonchi        [ ]Right [ ]Left [ ]Bilateral  [ ]Crackles        [ ]Right [ ]Left [ ]Bilateral  [ ]Wheezing     [ ]Right [ ]Left [ ]Bilatera  [ ]Diminished breath sounds [ ]right [ ]left [ ]bilateral  CARDIOVASCULAR:    [ ]Regular [ ]Irregular [ ]Tachy  [ ]Dionisio [ ]Murmur [ ]Other  GASTROINTESTINAL:  [ ]Soft  [ ]Distended   [ ]+BS  [ ]Non tender [ ]Tender  [ ]PEG [ ]OGT/ NGT  Last BM:     GENITOURINARY:  [ ]Normal [ ] Incontinent   [ ]Oliguria/Anuria   [ ]Martinez  MUSCULOSKELETAL:   [ ]Normal   [ ]Weakness  [ ]Bed/Wheelchair bound [ ]Edema  NEUROLOGIC:   [ ]No focal deficits  [ ]Cognitive impairment  [ ]Dysphagia [ ]Dysarthria [ ]Paresis [ ]Other   SKIN:   [ ]Normal    [ ]Rash  [ ]Pressure ulcer(s)       Present on admission [ ]y [ ]n    CRITICAL CARE:  [ ] Shock Present  [ ]Septic [ ]Cardiogenic [ ]Neurologic [ ]Hypovolemic  [ ]  Vasopressors [ ]  Inotropes   [ ]Respiratory failure present [ ]Mechanical ventilation [ ]Non-invasive ventilatory support [ ]High flow  [ ]Acute  [ ]Chronic [ ]Hypoxic  [ ]Hypercarbic [ ]Other  [ ]Other organ failure     LABS:                        9.2    3.76  )-----------( 76       ( 27 Feb 2020 03:32 )             29.4   02-27    137  |  101  |  16  ----------------------------<  115<H>  4.2   |  26  |  0.67    Ca    8.4      27 Feb 2020 03:32    TPro  6.1  /  Alb  4.0  /  TBili  0.5  /  DBili  x   /  AST  23  /  ALT  14  /  AlkPhos  44  02-26  PT/INR - ( 27 Feb 2020 03:32 )   PT: 13.7 sec;   INR: 1.19 ratio         PTT - ( 27 Feb 2020 03:32 )  PTT:109.4 sec      RADIOLOGY & ADDITIONAL STUDIES:    PROTEIN CALORIE MALNUTRITION PRESENT: [ ]mild [ ]moderate [ ]severe [ ]underweight [ ]morbid obesity  https://www.andeal.org/vault/2440/web/files/ONC/Table_Clinical%20Characteristics%20to%20Document%20Malnutrition-White%20JV%20et%20al%074653.pdf    Height (cm): 165.1 (02-25-20 @ 23:39), 149.9 (12-10-19 @ 11:12)  Weight (kg): 64.9 (02-25-20 @ 23:39), 66.9 (12-10-19 @ 11:12)  BMI (kg/m2): 23.8 (02-25-20 @ 23:39), 29.8 (12-10-19 @ 11:12)    [ ]PPSV2 < or = to 30% [ ]significant weight loss  [ ]poor nutritional intake  [ ]anasarca     Albumin, Serum: 4.0 g/dL (02-26-20 @ 02:49)   [ ]Artificial Nutrition      REFERRALS:   [ ]Chaplaincy  [ ]Hospice  [ ]Child Life  [ ]Social Work  [ ]Case management [ ]Holistic Therapy     Goals of Care Document:     Eliseo Albert MD  Pager: [phone] (M-F 9AM-4PM)  After 4PM and weekends: [phone]  PCU: [phone] HPI: 79F with PMH of MM (s/p L femoral ORIF for path fracture 2015) here with twisting injury, severe R knee pain, and inability to ambulate. Was found to have a R distal femur fracture and was transferred to [facility] for surgical management. Palliative care called to help with pain management. Patient is scheduled for ORIF.    PERTINENT PM/SXH:   MM    FAMILY HISTORY: non-contibutory    ITEMS NOT CHECKED ARE NOT PRESENT    SOCIAL HISTORY:   Significant other/partner[X ]  Children[X ]  Hoahaoism/Spirituality:  Substance hx:  [ ]   Tobacco hx:  [ ]   Alcohol hx: [ ]   Home Opioid hx:  [ ] I-Stop Reference No: [id number]    Rx Written	Rx Dispensed	Drug	Quantity	Days Supply	Prescriber Name  01/14/2020	01/19/2020	fentanyl 100 mcg/hr patch	10	30	Elif Gandhi  01/14/2020	01/19/2020	fentanyl 50 mcg/hr patch	10	30	Elif Gandhi  01/14/2020	01/15/2020	oxycodone hcl 20 mg tablet	90	30	Elif Gandhi  12/18/2019	12/18/2019	fentanyl 25 mcg/hr patch	10	30	Groysman, Nomi (D O )  12/18/2019	12/18/2019	fentanyl 100 mcg/hr patch	10	30	Groysman, Nomi (D O )  08/28/2019	08/31/2019	oxycodone hcl 10 mg tablet	30	30	Elif Gandhi  08/28/2019	08/31/2019	oxycodone hcl 20 mg tablet	90	15	Elif Gandhi  08/28/2019	08/31/2019	fentanyl 100 mcg/hr patch	10	30	Elif Gandhi  08/28/2019	08/31/2019	fentanyl 25 mcg/hr patch	10	30	Elif Gandhi    Living Situation: [X ]Home  [ ]Long term care  [ ]Rehab [ ]Other  Occupation:     ADVANCE DIRECTIVES:    DNR  Yes  MOLST  [ ]  Living Will  [ ]   DECISION MAKER(s):  [ ] Health Care Proxy(s)  [ ] Surrogate(s)  [ ] Guardian           Name(s): Phone Number(s):     BASELINE (I)ADL(s) (prior to admission):  [place]: [X ]Total  [ ] Moderate [ ]Dependent    Allergies    gadolinium (Rash)  No Known Drug Allergies    Intolerances    MEDICATIONS  (STANDING):  acetaminophen   Tablet .. 975 milliGRAM(s) Oral every 8 hours  acyclovir   Oral Tab/Cap 400 milliGRAM(s) Oral two times a day  escitalopram 20 milliGRAM(s) Oral daily  fentaNYL   Patch  75 MICROgram(s)/Hr 1 Patch Transdermal every 72 hours  fentaNYL   Patch  75 MICROgram(s)/Hr 1 Patch Transdermal every 72 hours  senna 2 Tablet(s) Oral at bedtime  sodium chloride 0.9%. 1000 milliLiter(s) (125 mL/Hr) IV Continuous <Continuous>    MEDICATIONS  (PRN):  calcium carbonate    500 mG (Tums) Chewable 1 Tablet(s) Chew every 6 hours PRN Heartburn  HYDROmorphone  Injectable 1 milliGRAM(s) IV Push every 4 hours PRN Severe Pain (7 - 10)  metoclopramide Injectable 10 milliGRAM(s) IV Push every 6 hours PRN nausa or vomiting  oxyCODONE    IR 30 milliGRAM(s) Oral every 4 hours PRN Severe Pain (7 - 10)  oxyCODONE    IR 20 milliGRAM(s) Oral every 3 hours PRN Moderate Pain (4 - 6)      PRESENT SYMPTOMS: [ ]Unable to obtain due to poor mentation   Source if other than patient:  [ ]Family   [ ]Team     Pain: [X ]yes [ ]no  QOL impact -   Location -   R knee                 Aggravating factors -  Quality - aching  Radiation - none  Timing- constant  Severity (0-10 scale): 10/10  Minimal acceptable level (0-10 scale):     PAIN AD Score:     http://geriatrictoolkit.[email or web address]/painad.pdf (press ctrl +  left click to view)    Dyspnea:                           [ ]Mild [ ]Moderate [ ]Severe  Anxiety:                             [ ]Mild [ ]Moderate [ ]Severe  Fatigue:                             [ ]Mild [ ]Moderate [ ]Severe  Nausea:                             [ ]Mild [ ]Moderate [ ]Severe  Loss of appetite:              [ ]Mild [ ]Moderate [ ]Severe  Constipation:                    [ ]Mild [ ]Moderate [ ]Severe    Other Symptoms:  [X ]All other review of systems negative     Palliative Performance Status Version 2: 70+%    http://npcrc.org/files/news/palliative_performance_scale_ppsv2.pdf    PHYSICAL EXAM:  Vital Signs Last 24 Hrs  T(C): 36.9 (27 Feb 2020 07:59), Max: 37.7 (26 Feb 2020 20:20)  T(F): 98.5 (27 Feb 2020 07:59), Max: 99.9 (26 Feb 2020 20:20)  HR: 76 (27 Feb 2020 07:59) (70 - 84)  BP: 150/66 (27 Feb 2020 07:59) (103/63 - 150/66)  BP(mean): --  RR: 18 (27 Feb 2020 07:59) (18 - 20)  SpO2: 97% (27 Feb 2020 07:59) (94% - 99%) I&O's Summary    26 Feb 2020 07:01  -  27 Feb 2020 07:00  --------------------------------------------------------  IN: 990 mL / OUT: 1700 mL / NET: -710 mL    27 Feb 2020 07:01  -  27 Feb 2020 11:16  --------------------------------------------------------  IN: 25 mL / OUT: 0 mL / NET: 25 mL        GENERAL:  [X ]Alert  [ ]Oriented x   [ ]Lethargic  [ ]Cachexia  [ ]Unarousable  [ X]Verbal  [ ]Non-Verbal  Behavioral:   [ X] Normal [ ] Anxiety  [ ] Delirium [ ] Agitation [ ] Other  HEENT:  [X ]Normal   [ ]Dry mouth   [ ]ET Tube/Trach  [ ]Oral lesions  PULMONARY:   [X ]Clear [ ]Tachypnea  [ ]Audible excessive secretions [ ] No labored breathing  [ ]Rhonchi        [ ]Right [ ]Left [ ]Bilateral  [ ]Crackles        [ ]Right [ ]Left [ ]Bilateral  [ ]Wheezing     [ ]Right [ ]Left [ ]Bilatera  [ ]Diminished breath sounds [ ]right [ ]left [ ]bilateral  CARDIOVASCULAR:    [ X]Regular [ ]Irregular [ ]Tachy  [ ]Dionsiio [ ]Murmur [ ]Other  GASTROINTESTINAL:  [X ]Soft  [ ]Distended   [X ]+BS  [X ]Non tender [ ]Tender  [ ]PEG [ ]OGT/ NGT  Last BM:     GENITOURINARY:  [ ]Normal [ ] Incontinent   [ ]Oliguria/Anuria   [ X]Martinez not present  MUSCULOSKELETAL:   [ ]Normal   [ ]Weakness  [ ]Bed/Wheelchair bound [ ]Edema  NEUROLOGIC:   [ X]No focal deficits  [ ]Cognitive impairment  [ ]Dysphagia [ ]Dysarthria [ ]Paresis [ ]Other   SKIN:   [ ]Normal    [ ]Rash  [ ]Pressure ulcer(s)       Present on admission [ ]y [ ]n    CRITICAL CARE:  [ ] Shock Present  [ ]Septic [ ]Cardiogenic [ ]Neurologic [ ]Hypovolemic  [ ]  Vasopressors [ ]  Inotropes   [ ]Respiratory failure present [ ]Mechanical ventilation [ ]Non-invasive ventilatory support [ ]High flow  [ ]Acute  [ ]Chronic [ ]Hypoxic  [ ]Hypercarbic [ ]Other  [ ]Other organ failure     LABS: reviewed                        9.2    3.76  )-----------( 76       ( 27 Feb 2020 03:32 )             29.4   02-27    137  |  101  |  16  ----------------------------<  115<H>  4.2   |  26  |  0.67    Ca    8.4      27 Feb 2020 03:32    TPro  6.1  /  Alb  4.0  /  TBili  0.5  /  DBili  x   /  AST  23  /  ALT  14  /  AlkPhos  44  02-26  PT/INR - ( 27 Feb 2020 03:32 )   PT: 13.7 sec;   INR: 1.19 ratio         PTT - ( 27 Feb 2020 03:32 )  PTT:109.4 sec      RADIOLOGY & ADDITIONAL STUDIES:  CT R knee  1. Pathologic, impacted fracture of the distal femoral metadiaphysis, secondary to an underlying marrow based soft tissue mass.  2. Lipohemarthrosis is likely related to the relation of the fracture to the superior margin of the patellofemoral compartment.  3. Indeterminate 1 cm lesion in the lateral femoral condyle.      PROTEIN CALORIE MALNUTRITION PRESENT: [ ]mild [ ]moderate [ ]severe [ ]underweight [ ]morbid obesity  https://www.andeal.org/vault/9730/web/files/ONC/Table_Clinical%20Characteristics%20to%20Document%20Malnutrition-White%20JV%20et%20al%202012.pdf    Height (cm): 165.1 (02-25-20 @ 23:39), 149.9 (12-10-19 @ 11:12)  Weight (kg): 64.9 (02-25-20 @ 23:39), 66.9 (12-10-19 @ 11:12)  BMI (kg/m2): 23.8 (02-25-20 @ 23:39), 29.8 (12-10-19 @ 11:12)    [ ]PPSV2 < or = to 30% [ ]significant weight loss  [ ]poor nutritional intake  [ ]anasarca     Albumin, Serum: 4.0 g/dL (02-26-20 @ 02:49)   [ ]Artificial Nutrition      REFERRALS:   [ ]Chaplaincy  [ ]Hospice  [ ]Child Life  [ ]Social Work  [ ]Case management [ ]Holistic Therapy     Goals of Care Document:     ______________  Eliseo Albert MD   of Geriatric and Palliative Medicine  [facility]     Please page the following number for clinical matters between the hours of 9AM and 5PM   from Monday through Friday : [phone]    After 5PM and on weekends, please page: [phone]. The Geriatric and Palliative Medicine consult service has 24/7 coverage for medical recommendations, including for symptom management needs.

## 2020-02-27 NOTE — PROGRESS NOTE ADULT - SUBJECTIVE AND OBJECTIVE BOX
Subjective: seen on rounds, pain controlled, no issues this AM    Exam:    Neuro: Alert  GA: well-appearing  Pulm: breathing comfortably  RLE: Skin intact; No ecchymosis/soft tissue swelling  Compartments soft; + TTP about hip. No TTP to knee/leg/ankle/foot   ROM limited 2/2 pain   Unable to SLR; + Log Roll/Heel Strike  Motor intact GS/TA/FHL/EHL  SILT L2-S1  DP/PT pulses 2+    LLE/BUE:   No bony TTP; Good ROM w/o pain; Exam Unremarkable    Vital Signs Last 24 Hrs  T(C): 37.1 (2020 04:27), Max: 37.7 (2020 20:20)  T(F): 98.7 (2020 04:27), Max: 99.9 (2020 20:20)  HR: 73 (2020 04:27) (70 - 84)  BP: 139/88 (2020 04:27) (103/63 - 142/89)  BP(mean): --  RR: 18 (2020 04:27) (18 - 20)  SpO2: 99% (2020 04:27) (93% - 99%)    I&O's Detail    2020 07:01  -  2020 06:31  --------------------------------------------------------  IN:    Oral Fluid: 240 mL    sodium chloride 0.9%.: 750 mL  Total IN: 990 mL    OUT:    Incontinent per Collection Ba mL    Voided: 500 mL  Total OUT: 1700 mL    Total NET: -710 mL      MEDICATIONS  (STANDING):  acetaminophen   Tablet .. 975 milliGRAM(s) Oral every 8 hours  acyclovir   Oral Tab/Cap 400 milliGRAM(s) Oral two times a day  escitalopram 20 milliGRAM(s) Oral daily  fentaNYL   Patch  75 MICROgram(s)/Hr 1 Patch Transdermal every 72 hours  fentaNYL   Patch  75 MICROgram(s)/Hr 1 Patch Transdermal every 72 hours  heparin  Infusion.  Unit(s)/Hr (11 mL/Hr) IV Continuous <Continuous>  senna 2 Tablet(s) Oral at bedtime  sodium chloride 0.9%. 1000 milliLiter(s) (125 mL/Hr) IV Continuous <Continuous>    MEDICATIONS  (PRN):  HYDROmorphone  Injectable 1 milliGRAM(s) IV Push every 4 hours PRN Severe Pain (7 - 10)  metoclopramide Injectable 10 milliGRAM(s) IV Push every 6 hours PRN nausa or vomiting  oxyCODONE    IR 30 milliGRAM(s) Oral every 4 hours PRN Severe Pain (7 - 10)  oxyCODONE    IR 20 milliGRAM(s) Oral every 3 hours PRN Moderate Pain (4 - 6)      LABS:                        9.2    3.76  )-----------( 76       ( 2020 03:32 )             29.4         137  |  101  |  16  ----------------------------<  115<H>  4.2   |  26  |  0.67    Ca    8.4      2020 03:32    TPro  6.1  /  Alb  4.0  /  TBili  0.5  /  DBili  x   /  AST  23  /  ALT  14  /  AlkPhos  44      PT/INR - ( 2020 03:32 )   PT: 13.7 sec;   INR: 1.19 ratio         PTT - ( 2020 03:32 )  PTT:109.4 sec  LIVER FUNCTIONS - ( 2020 02:49 )  Alb: 4.0 g/dL / Pro: 6.1 g/dL / ALK PHOS: 44 U/L / ALT: 14 U/L / AST: 23 U/L / GGT: x             ABO Interpretation: B (20 @ 03:42)

## 2020-02-28 ENCOUNTER — RESULT REVIEW (OUTPATIENT)
Age: 80
End: 2020-02-28

## 2020-02-28 LAB
ALBUMIN SERPL ELPH-MCNC: 3.1 G/DL — LOW (ref 3.3–5)
ALP SERPL-CCNC: 45 U/L — SIGNIFICANT CHANGE UP (ref 40–120)
ALT FLD-CCNC: 10 U/L — SIGNIFICANT CHANGE UP (ref 10–45)
ANION GAP SERPL CALC-SCNC: 11 MMOL/L — SIGNIFICANT CHANGE UP (ref 5–17)
ANION GAP SERPL CALC-SCNC: 14 MMOL/L — SIGNIFICANT CHANGE UP (ref 5–17)
APTT BLD: 29.8 SEC — SIGNIFICANT CHANGE UP (ref 27.5–36.3)
AST SERPL-CCNC: 12 U/L — SIGNIFICANT CHANGE UP (ref 10–40)
BASOPHILS # BLD AUTO: 0.01 K/UL — SIGNIFICANT CHANGE UP (ref 0–0.2)
BASOPHILS NFR BLD AUTO: 0.2 % — SIGNIFICANT CHANGE UP (ref 0–2)
BILIRUB SERPL-MCNC: 0.4 MG/DL — SIGNIFICANT CHANGE UP (ref 0.2–1.2)
BUN SERPL-MCNC: 10 MG/DL — SIGNIFICANT CHANGE UP (ref 7–23)
BUN SERPL-MCNC: 9 MG/DL — SIGNIFICANT CHANGE UP (ref 7–23)
CALCIUM SERPL-MCNC: 8 MG/DL — LOW (ref 8.4–10.5)
CALCIUM SERPL-MCNC: 8.1 MG/DL — LOW (ref 8.4–10.5)
CHLORIDE SERPL-SCNC: 100 MMOL/L — SIGNIFICANT CHANGE UP (ref 96–108)
CHLORIDE SERPL-SCNC: 100 MMOL/L — SIGNIFICANT CHANGE UP (ref 96–108)
CO2 SERPL-SCNC: 19 MMOL/L — LOW (ref 22–31)
CO2 SERPL-SCNC: 21 MMOL/L — LOW (ref 22–31)
CREAT SERPL-MCNC: 0.43 MG/DL — LOW (ref 0.5–1.3)
CREAT SERPL-MCNC: 0.49 MG/DL — LOW (ref 0.5–1.3)
EOSINOPHIL # BLD AUTO: 0.01 K/UL — SIGNIFICANT CHANGE UP (ref 0–0.5)
EOSINOPHIL NFR BLD AUTO: 0.2 % — SIGNIFICANT CHANGE UP (ref 0–6)
GLUCOSE SERPL-MCNC: 103 MG/DL — HIGH (ref 70–99)
GLUCOSE SERPL-MCNC: 133 MG/DL — HIGH (ref 70–99)
HCT VFR BLD CALC: 31.8 % — LOW (ref 34.5–45)
HCT VFR BLD CALC: 32.4 % — LOW (ref 34.5–45)
HGB BLD-MCNC: 10.2 G/DL — LOW (ref 11.5–15.5)
HGB BLD-MCNC: 10.3 G/DL — LOW (ref 11.5–15.5)
IMM GRANULOCYTES NFR BLD AUTO: 0.7 % — SIGNIFICANT CHANGE UP (ref 0–1.5)
INR BLD: 1.15 RATIO — SIGNIFICANT CHANGE UP (ref 0.88–1.16)
LYMPHOCYTES # BLD AUTO: 0.22 K/UL — LOW (ref 1–3.3)
LYMPHOCYTES # BLD AUTO: 3.6 % — LOW (ref 13–44)
MCHC RBC-ENTMCNC: 29.6 PG — SIGNIFICANT CHANGE UP (ref 27–34)
MCHC RBC-ENTMCNC: 30.6 PG — SIGNIFICANT CHANGE UP (ref 27–34)
MCHC RBC-ENTMCNC: 31.5 GM/DL — LOW (ref 32–36)
MCHC RBC-ENTMCNC: 32.4 GM/DL — SIGNIFICANT CHANGE UP (ref 32–36)
MCV RBC AUTO: 93.9 FL — SIGNIFICANT CHANGE UP (ref 80–100)
MCV RBC AUTO: 94.4 FL — SIGNIFICANT CHANGE UP (ref 80–100)
MONOCYTES # BLD AUTO: 0.23 K/UL — SIGNIFICANT CHANGE UP (ref 0–0.9)
MONOCYTES NFR BLD AUTO: 3.8 % — SIGNIFICANT CHANGE UP (ref 2–14)
NEUTROPHILS # BLD AUTO: 5.53 K/UL — SIGNIFICANT CHANGE UP (ref 1.8–7.4)
NEUTROPHILS NFR BLD AUTO: 91.5 % — HIGH (ref 43–77)
NRBC # BLD: 0 /100 WBCS — SIGNIFICANT CHANGE UP (ref 0–0)
NRBC # BLD: 0 /100 WBCS — SIGNIFICANT CHANGE UP (ref 0–0)
PLATELET # BLD AUTO: 108 K/UL — LOW (ref 150–400)
PLATELET # BLD AUTO: 120 K/UL — LOW (ref 150–400)
POTASSIUM SERPL-MCNC: 3.6 MMOL/L — SIGNIFICANT CHANGE UP (ref 3.5–5.3)
POTASSIUM SERPL-MCNC: 3.7 MMOL/L — SIGNIFICANT CHANGE UP (ref 3.5–5.3)
POTASSIUM SERPL-SCNC: 3.6 MMOL/L — SIGNIFICANT CHANGE UP (ref 3.5–5.3)
POTASSIUM SERPL-SCNC: 3.7 MMOL/L — SIGNIFICANT CHANGE UP (ref 3.5–5.3)
PROT SERPL-MCNC: 5.3 G/DL — LOW (ref 6–8.3)
PROTHROM AB SERPL-ACNC: 13 SEC — SIGNIFICANT CHANGE UP (ref 10–13.1)
RBC # BLD: 3.37 M/UL — LOW (ref 3.8–5.2)
RBC # BLD: 3.45 M/UL — LOW (ref 3.8–5.2)
RBC # FLD: 15.7 % — HIGH (ref 10.3–14.5)
RBC # FLD: 15.8 % — HIGH (ref 10.3–14.5)
SODIUM SERPL-SCNC: 132 MMOL/L — LOW (ref 135–145)
SODIUM SERPL-SCNC: 133 MMOL/L — LOW (ref 135–145)
WBC # BLD: 4.35 K/UL — SIGNIFICANT CHANGE UP (ref 3.8–10.5)
WBC # BLD: 6.04 K/UL — SIGNIFICANT CHANGE UP (ref 3.8–10.5)
WBC # FLD AUTO: 4.35 K/UL — SIGNIFICANT CHANGE UP (ref 3.8–10.5)
WBC # FLD AUTO: 6.04 K/UL — SIGNIFICANT CHANGE UP (ref 3.8–10.5)

## 2020-02-28 PROCEDURE — 27507 TREATMENT OF THIGH FRACTURE: CPT | Mod: RT

## 2020-02-28 PROCEDURE — 88311 DECALCIFY TISSUE: CPT | Mod: 26

## 2020-02-28 PROCEDURE — 88360 TUMOR IMMUNOHISTOCHEM/MANUAL: CPT | Mod: 26

## 2020-02-28 PROCEDURE — 88341 IMHCHEM/IMCYTCHM EA ADD ANTB: CPT | Mod: 26,59

## 2020-02-28 PROCEDURE — 27356 REMOVE FEMUR LESION/GRAFT: CPT | Mod: RT

## 2020-02-28 PROCEDURE — 88365 INSITU HYBRIDIZATION (FISH): CPT | Mod: 26

## 2020-02-28 PROCEDURE — 88304 TISSUE EXAM BY PATHOLOGIST: CPT | Mod: 26

## 2020-02-28 PROCEDURE — 88305 TISSUE EXAM BY PATHOLOGIST: CPT | Mod: 26

## 2020-02-28 PROCEDURE — 88342 IMHCHEM/IMCYTCHM 1ST ANTB: CPT | Mod: 26,59

## 2020-02-28 RX ORDER — OXYCODONE HYDROCHLORIDE 5 MG/1
5 TABLET ORAL EVERY 4 HOURS
Refills: 0 | Status: DISCONTINUED | OUTPATIENT
Start: 2020-02-28 | End: 2020-03-03

## 2020-02-28 RX ORDER — SODIUM CHLORIDE 9 MG/ML
1000 INJECTION, SOLUTION INTRAVENOUS
Refills: 0 | Status: DISCONTINUED | OUTPATIENT
Start: 2020-02-28 | End: 2020-03-03

## 2020-02-28 RX ORDER — APIXABAN 2.5 MG/1
5 TABLET, FILM COATED ORAL
Refills: 0 | Status: DISCONTINUED | OUTPATIENT
Start: 2020-02-29 | End: 2020-03-03

## 2020-02-28 RX ORDER — DIPHENHYDRAMINE HCL 50 MG
25 CAPSULE ORAL AT BEDTIME
Refills: 0 | Status: DISCONTINUED | OUTPATIENT
Start: 2020-02-28 | End: 2020-02-28

## 2020-02-28 RX ORDER — ACETAMINOPHEN 500 MG
1000 TABLET ORAL ONCE
Refills: 0 | Status: COMPLETED | OUTPATIENT
Start: 2020-02-28 | End: 2020-02-28

## 2020-02-28 RX ORDER — HYDROMORPHONE HYDROCHLORIDE 2 MG/ML
0.5 INJECTION INTRAMUSCULAR; INTRAVENOUS; SUBCUTANEOUS EVERY 6 HOURS
Refills: 0 | Status: DISCONTINUED | OUTPATIENT
Start: 2020-02-28 | End: 2020-03-03

## 2020-02-28 RX ORDER — FENTANYL CITRATE 50 UG/ML
1 INJECTION INTRAVENOUS
Refills: 0 | Status: DISCONTINUED | OUTPATIENT
Start: 2020-02-28 | End: 2020-03-03

## 2020-02-28 RX ORDER — ONDANSETRON 8 MG/1
4 TABLET, FILM COATED ORAL ONCE
Refills: 0 | Status: DISCONTINUED | OUTPATIENT
Start: 2020-02-28 | End: 2020-02-28

## 2020-02-28 RX ORDER — SODIUM CHLORIDE 9 MG/ML
500 INJECTION INTRAMUSCULAR; INTRAVENOUS; SUBCUTANEOUS ONCE
Refills: 0 | Status: COMPLETED | OUTPATIENT
Start: 2020-02-29 | End: 2020-02-29

## 2020-02-28 RX ORDER — HYDROMORPHONE HYDROCHLORIDE 2 MG/ML
0.5 INJECTION INTRAMUSCULAR; INTRAVENOUS; SUBCUTANEOUS EVERY 6 HOURS
Refills: 0 | Status: DISCONTINUED | OUTPATIENT
Start: 2020-02-28 | End: 2020-02-28

## 2020-02-28 RX ORDER — CHLORHEXIDINE GLUCONATE 213 G/1000ML
1 SOLUTION TOPICAL ONCE
Refills: 0 | Status: COMPLETED | OUTPATIENT
Start: 2020-02-28 | End: 2020-02-28

## 2020-02-28 RX ORDER — HYDROMORPHONE HYDROCHLORIDE 2 MG/ML
0.25 INJECTION INTRAMUSCULAR; INTRAVENOUS; SUBCUTANEOUS
Refills: 0 | Status: DISCONTINUED | OUTPATIENT
Start: 2020-02-28 | End: 2020-02-28

## 2020-02-28 RX ORDER — OXYCODONE HYDROCHLORIDE 5 MG/1
10 TABLET ORAL EVERY 4 HOURS
Refills: 0 | Status: DISCONTINUED | OUTPATIENT
Start: 2020-02-28 | End: 2020-03-03

## 2020-02-28 RX ORDER — CEFAZOLIN SODIUM 1 G
2000 VIAL (EA) INJECTION EVERY 8 HOURS
Refills: 0 | Status: COMPLETED | OUTPATIENT
Start: 2020-02-28 | End: 2020-02-29

## 2020-02-28 RX ORDER — LANOLIN ALCOHOL/MO/W.PET/CERES
5 CREAM (GRAM) TOPICAL AT BEDTIME
Refills: 0 | Status: DISCONTINUED | OUTPATIENT
Start: 2020-02-28 | End: 2020-03-03

## 2020-02-28 RX ORDER — PANTOPRAZOLE SODIUM 20 MG/1
40 TABLET, DELAYED RELEASE ORAL
Refills: 0 | Status: DISCONTINUED | OUTPATIENT
Start: 2020-02-28 | End: 2020-03-03

## 2020-02-28 RX ORDER — ACETAMINOPHEN 500 MG
975 TABLET ORAL EVERY 8 HOURS
Refills: 0 | Status: COMPLETED | OUTPATIENT
Start: 2020-02-29 | End: 2020-03-02

## 2020-02-28 RX ADMIN — OXYCODONE HYDROCHLORIDE 30 MILLIGRAM(S): 5 TABLET ORAL at 20:17

## 2020-02-28 RX ADMIN — Medication 1000 MILLIGRAM(S): at 22:40

## 2020-02-28 RX ADMIN — Medication 400 MILLIGRAM(S): at 22:07

## 2020-02-28 RX ADMIN — ESCITALOPRAM OXALATE 20 MILLIGRAM(S): 10 TABLET, FILM COATED ORAL at 06:12

## 2020-02-28 RX ADMIN — HYDROMORPHONE HYDROCHLORIDE 0.25 MILLIGRAM(S): 2 INJECTION INTRAMUSCULAR; INTRAVENOUS; SUBCUTANEOUS at 16:45

## 2020-02-28 RX ADMIN — Medication 100 MILLIGRAM(S): at 22:00

## 2020-02-28 RX ADMIN — FENTANYL CITRATE 1 PATCH: 50 INJECTION INTRAVENOUS at 22:03

## 2020-02-28 RX ADMIN — OXYCODONE HYDROCHLORIDE 30 MILLIGRAM(S): 5 TABLET ORAL at 20:45

## 2020-02-28 RX ADMIN — HYDROMORPHONE HYDROCHLORIDE 0.25 MILLIGRAM(S): 2 INJECTION INTRAMUSCULAR; INTRAVENOUS; SUBCUTANEOUS at 16:56

## 2020-02-28 RX ADMIN — OXYCODONE HYDROCHLORIDE 30 MILLIGRAM(S): 5 TABLET ORAL at 06:43

## 2020-02-28 RX ADMIN — Medication 10 MILLIGRAM(S): at 10:51

## 2020-02-28 RX ADMIN — Medication 400 MILLIGRAM(S): at 20:19

## 2020-02-28 RX ADMIN — CHLORHEXIDINE GLUCONATE 1 APPLICATION(S): 213 SOLUTION TOPICAL at 08:51

## 2020-02-28 RX ADMIN — Medication 400 MILLIGRAM(S): at 06:12

## 2020-02-28 RX ADMIN — HYDROMORPHONE HYDROCHLORIDE 0.25 MILLIGRAM(S): 2 INJECTION INTRAMUSCULAR; INTRAVENOUS; SUBCUTANEOUS at 16:57

## 2020-02-28 RX ADMIN — SODIUM CHLORIDE 75 MILLILITER(S): 9 INJECTION, SOLUTION INTRAVENOUS at 18:41

## 2020-02-28 RX ADMIN — OXYCODONE HYDROCHLORIDE 30 MILLIGRAM(S): 5 TABLET ORAL at 00:05

## 2020-02-28 RX ADMIN — HYDROMORPHONE HYDROCHLORIDE 0.25 MILLIGRAM(S): 2 INJECTION INTRAMUSCULAR; INTRAVENOUS; SUBCUTANEOUS at 16:30

## 2020-02-28 NOTE — PHYSICAL THERAPY INITIAL EVALUATION ADULT - PERTINENT HX OF CURRENT PROBLEM, REHAB EVAL
Pt is a 79y Female PMHx multiple myeloma h/o L femur ORIF for pathologic fracture (5 years ago, OSH), R restoration modular jacques hip arthroplasty replacement presents s/p twisting injury c/o severe R knee pain and inability to ambulate.Found to displaced pathological fracture right distal femur. R knee CT: Pathologic, impacted fracture of the distal femoral metadiaphysis. Indeterminate 1 cm lesion in the lateral femoral condyle. Now s/p R femur ORIF 2/28. Pt is a 79y Female PMHx multiple myeloma h/o L femur ORIF for pathologic fracture (5 years ago, OSH), R restoration modular jacques hip arthroplasty replacement presents s/p twisting injury c/o severe R knee pain and inability to ambulate.Found to displaced pathological fracture right distal femur. R knee CT: Pathologic, impacted fracture of the distal femoral metadiaphysis. Indeterminate 1 cm lesion in the lateral femoral condyle. Now s/p R femur ORIF on 2/28/20, NWB to RLE.

## 2020-02-28 NOTE — PHYSICAL THERAPY INITIAL EVALUATION ADULT - BALANCE TRAINING, PT EVAL
GOAL: Pt will demonstrate improved static/dynamic standing balance by one grade, in order to improve stability, decrease fall risk and increase independence with ADLs within 4 weeks.

## 2020-02-28 NOTE — PHYSICAL THERAPY INITIAL EVALUATION ADULT - IMPAIRMENTS FOUND, PT EVAL
gait, locomotion, and balance/aerobic capacity/endurance/joint integrity and mobility/muscle strength/ROM

## 2020-02-28 NOTE — CHART NOTE - NSCHARTNOTEFT_GEN_A_CORE
Resting without complaints.   Son @ bedside No Chest Pain, SOB, N/V.    T(C): 36.2 (02-28-20 @ 17:00), Max: 37.5 (02-27-20 @ 21:45)  HR: 70 (02-28-20 @ 17:45) (70 - 85)  BP: 145/78 (02-28-20 @ 17:45) (115/70 - 194/83)  RR: 16 (02-28-20 @ 17:45) (16 - 18)  SpO2: 95% (02-28-20 @ 17:45) (94% - 98%)    HMV: 50cc    Exam:  Alert and [place], No Acute Distress  Card: +S1/S2, RRR  Pulm: CTAB  Abdomen soft / benign  Martinez  [ Y]   EXT   RLE      Knee Immobilizer in tact       ACE Dressing (s) C/D  [x ]           HV in tact       Calves soft       (+) DF  PF (moderate) EHL/FHL 5/5        No Sensory Deficits noted        2-3+ edema noted        1+ pulses    Xray:----                          10.3<L>  6.04  )-----------( 120<L>    ( 28 Feb 2020 16:43 )             31.8<L>     02-28    133<L>  |  100  |  9   ----------------------------<  133<H>  3.7   |  19<L>  |  0.43<L>          A/P: S/p ORIF, fracture, distal femur    -PT: NWB  - Cont HMV  -Chk AM Labs  -DVT PPx: Eiquis BID (5mg)  -Pain Control PO/IV Pain Rx  -Continue Current Tx--palliative note appreciated  -Dispo planning: Pt prefers to go -> home      ***See Above  Terence ALBERTS  Orthopedics  B: [phone]  S: [phone]

## 2020-02-28 NOTE — PHYSICAL THERAPY INITIAL EVALUATION ADULT - TRANSFER TRAINING, PT EVAL
GOAL: Pt will perform ALL transfers w/ [place], w/use of appropriate assistive device as needed, in 2 weeks.

## 2020-02-28 NOTE — PROGRESS NOTE ADULT - ASSESSMENT
Patient found to have a distal right femur fx and is scheduled for Open reduction and internal fixation. Patient seen with continue pain at fx site and seen by palliative care for pre-op pain control. Patient transferred to [facility] for orthopedics consultation. Patient found to have a distal right femur fx. The patient underwent plate and screw ORIF of the right distal femur on 02/28/20. She was seen by palliative care for pre-op pain control and will require continuation of pain control post operatively. She is presently post-op and hemodynamically stable.

## 2020-02-28 NOTE — PHYSICAL THERAPY INITIAL EVALUATION ADULT - MANUAL MUSCLE TESTING RESULTS, REHAB EVAL
grossly assessed due to/B UE: ~3+/5, LLE: ~3+/5 not formally tested; RLE: not formally tested 2/2 post-op precaution

## 2020-02-28 NOTE — PROGRESS NOTE ADULT - ASSESSMENT
79F w/ Pathologic distal femur fracture for OR today    Neuro: Pain control  Resp: IS  GI: NPO/IVF  MSK: bedrest  Heme: DVT PPX on hold for OR

## 2020-02-28 NOTE — PHYSICAL THERAPY INITIAL EVALUATION ADULT - STRENGTHENING, PT EVAL
GOAL: Pt will improve bilateral LE strength by 1/2 grade, for increased limb stability, to improve gait and facilitate stair negotiation in 2 weeks.

## 2020-02-28 NOTE — PRE-ANESTHESIA EVALUATION ADULT - NSANTHPMHFT_GEN_ALL_CORE
PMH: Multiple myeloma, pathological fractures, Right leg DVT, GERD  PSH: R hip replacement, Left Femur ORIF

## 2020-02-28 NOTE — PHYSICAL THERAPY INITIAL EVALUATION ADULT - ADDITIONAL COMMENTS
Pt lives with spouse in , enters from garage with stairlift into home. Bathroom has walk-in shower, shower bench, +grab bars. Previously (I) in all ADLs and ambulated with single cane. Reports as of recently, she has been ambulating with RW due to pain. Also owns w/c.

## 2020-02-28 NOTE — PHYSICAL THERAPY INITIAL EVALUATION ADULT - GENERAL OBSERVATIONS, REHAB EVAL
Rec'd semi-supine in bed, in NAD, A&Ox4, +HMV, +Martinez, +IV, +knee immobilizer on RLE, agreeable to PT.

## 2020-02-28 NOTE — PROGRESS NOTE ADULT - SUBJECTIVE AND OBJECTIVE BOX
Patient is a 79y old  Female who presents with a chief complaint of pathologic fracture.  PMHx of MM on chemo p/w RLE pain.  Patient reports that she has had right knee pain for the past 5 days.  Today she twisted to get into her wheelchair and had severe right knee pain and felt a "crack." Patient was unable to ambulate. Called EMS and was taken to [facility] where she was diagnosed with a distal femur fracture.  Patient transferred to [facility] for orthopedics consult. Patient found to have a distal right femur fx and is scheduled for Open reduction and internal fixation. Patient seen with continue pain at fx site and seen by palliative care for pre-op pain control.    MEDICATIONS  (STANDING):  acetaminophen   Tablet .. 975 milliGRAM(s) Oral every 8 hours  acyclovir   Oral Tab/Cap 400 milliGRAM(s) Oral two times a day  ceFAZolin   IVPB 2000 milliGRAM(s) IV Intermittent every 8 hours  escitalopram 20 milliGRAM(s) Oral daily  fentaNYL   Patch  75 MICROgram(s)/Hr 1 Patch Transdermal every 72 hours  fentaNYL   Patch  75 MICROgram(s)/Hr 1 Patch Transdermal every 72 hours  lactated ringers. 1000 milliLiter(s) (75 mL/Hr) IV Continuous <Continuous>  senna 2 Tablet(s) Oral at bedtime  sodium chloride 0.9%. 1000 milliLiter(s) (125 mL/Hr) IV Continuous <Continuous>    MEDICATIONS  (PRN):  calcium carbonate    500 mG (Tums) Chewable 1 Tablet(s) Chew every 6 hours PRN Heartburn  diphenhydrAMINE 25 milliGRAM(s) Oral at bedtime PRN Insomnia  HYDROmorphone  Injectable 0.25 milliGRAM(s) IV Push every 10 minutes PRN Moderate Pain (4 - 6)  HYDROmorphone  Injectable 0.5 milliGRAM(s) IV Push every 6 hours PRN severe breakthrough pain  metoclopramide Injectable 10 milliGRAM(s) IV Push every 6 hours PRN nausa or vomiting  ondansetron Injectable 4 milliGRAM(s) IV Push once PRN Nausea and/or Vomiting  oxyCODONE    IR 10 milliGRAM(s) Oral every 4 hours PRN Moderate Pain (4 - 6)  oxyCODONE    IR 5 milliGRAM(s) Oral every 4 hours PRN Mild Pain (1 - 3)  oxyCODONE    IR 30 milliGRAM(s) Oral every 4 hours PRN Severe Pain (7 - 10)      Allergies    Vital Signs Last 24 Hrs  T(C): 36.2 (28 Feb 2020 16:12), Max: 37.5 (27 Feb 2020 21:45)  T(F): 97.2 (28 Feb 2020 16:12), Max: 99.5 (27 Feb 2020 21:45)  HR: 78 (28 Feb 2020 16:12) (73 - 85)  BP: 161/76 (28 Feb 2020 16:12) (115/70 - 164/96)  BP(mean): 109 (28 Feb 2020 16:12) (109 - 109)  RR: 16 (28 Feb 2020 16:12) (16 - 18)  SpO2: 95% (28 Feb 2020 16:12) (95% - 98%)    PHYSICAL EXAM:  GENERAL: NAD, well nourished and conversant  HEAD:  Atraumatic  EYES: EOM, PERRLA, conjunctiva pink and sclera white  ENT: No tonsillar erythema, exudates, or enlargement, moist mucous membranes, good dentition, no lesions  NECK: Supple, No JVD, normal thyroid, carotids with normal upstrokes and no bruits  CHEST/LUNOUS SYSTEM:  Dis            CBC Full  -  ( 28 Feb 2020 16:43 )  WBC Count : 6.04 K/uL  RBC Count : 3.37 M/uL  Hemoglobin : 10.3 g/dL  Hematocrit : 31.8 %  Platelet Count - Automated : 120 K/uL  Mean Cell Volume : 94.4 fl  Mean Cell Hemoglobin : 30.6 pg  Mean Cell Hemoglobin Concentration : 32.4 gm/dL  Auto Neutrophil # : 5.53 K/uL  Auto Lymphocyte # : 0.22 K/uL  Auto Monocyte # : 0.23 K/uL  Auto Eosinophil # : 0.01 K/uL  Auto Basophil # : 0.01 K/uL  Auto Neutrophil % : 91.5 %  Auto Lymphocyte % : 3.6 %  Auto Monocyte % : 3.8 %  Auto Eosinophil % : 0.2 %  Auto Basophil % : 0.2 %    02-28    132<L>  |  100  |  10  ----------------------------<  103<H>  3.6   |  21<L>  |  0.49<L>    Ca    8.1<L>      28 Feb 2020 07:26    TPro  5.3<L>  /  Alb  3.1<L>  /  TBili  0.4  /  DBili  x   /  AST  12  /  ALT  10  /  AlkPhos  45  02-28    LIVER FUNCTIONS - ( 28 Feb 2020 07:26 )  Alb: 3.1 g/dL / Pro: 5.3 g/dL / ALK PHOS: 45 U/L / ALT: 10 U/L / AST: 12 U/L / GGT: x           PT/INR - ( 28 Feb 2020 08:56 )   PT: 13.0 sec;   INR: 1.15 ratio         PTT - ( 28 Feb 2020 08:56 )  PTT:29.8 sec Patient is a 79y old  Female who presents with a chief complaint of pathologic fracture.  PMHx of MM on chemo p/w RLE pain.  Patient reports that she has had right knee pain for the past 5 days.  Today she twisted to get into her wheelchair and had severe right knee pain and felt a "crack." Patient was unable to ambulate. Called EMS and was taken to [facility] where she was diagnosed with a distal femur fracture.  Patient transferred to [facility] for orthopedics consultation. Patient found to have a distal right femur fx. The patient underwent plate and screw ORIF of the right distal femur on 02/28/20. She was seen by palliative care for pre-op pain control and will require continuation of pain control post operatively. She is presently post-op and hemodynamically stable.    MEDICATIONS  (STANDING):  acetaminophen   Tablet .. 975 milliGRAM(s) Oral every 8 hours  acyclovir   Oral Tab/Cap 400 milliGRAM(s) Oral two times a day  ceFAZolin   IVPB 2000 milliGRAM(s) IV Intermittent every 8 hours  escitalopram 20 milliGRAM(s) Oral daily  fentaNYL   Patch  75 MICROgram(s)/Hr 1 Patch Transdermal every 72 hours  fentaNYL   Patch  75 MICROgram(s)/Hr 1 Patch Transdermal every 72 hours  lactated ringers. 1000 milliLiter(s) (75 mL/Hr) IV Continuous <Continuous>  senna 2 Tablet(s) Oral at bedtime  sodium chloride 0.9%. 1000 milliLiter(s) (125 mL/Hr) IV Continuous <Continuous>    MEDICATIONS  (PRN):  calcium carbonate    500 mG (Tums) Chewable 1 Tablet(s) Chew every 6 hours PRN Heartburn  diphenhydrAMINE 25 milliGRAM(s) Oral at bedtime PRN Insomnia  HYDROmorphone  Injectable 0.25 milliGRAM(s) IV Push every 10 minutes PRN Moderate Pain (4 - 6)  HYDROmorphone  Injectable 0.5 milliGRAM(s) IV Push every 6 hours PRN severe breakthrough pain  metoclopramide Injectable 10 milliGRAM(s) IV Push every 6 hours PRN nausa or vomiting  ondansetron Injectable 4 milliGRAM(s) IV Push once PRN Nausea and/or Vomiting  oxyCODONE    IR 10 milliGRAM(s) Oral every 4 hours PRN Moderate Pain (4 - 6)  oxyCODONE    IR 5 milliGRAM(s) Oral every 4 hours PRN Mild Pain (1 - 3)  oxyCODONE    IR 30 milliGRAM(s) Oral every 4 hours PRN Severe Pain (7 - 10)      Allergies    Vital Signs Last 24 Hrs  T(C): 36.2 (28 Feb 2020 16:12), Max: 37.5 (27 Feb 2020 21:45)  T(F): 97.2 (28 Feb 2020 16:12), Max: 99.5 (27 Feb 2020 21:45)  HR: 78 (28 Feb 2020 16:12) (73 - 85)  BP: 161/76 (28 Feb 2020 16:12) (115/70 - 164/96)  BP(mean): 109 (28 Feb 2020 16:12) (109 - 109)  RR: 16 (28 Feb 2020 16:12) (16 - 18)  SpO2: 95% (28 Feb 2020 16:12) (95% - 98%)    PHYSICAL EXAM:  GENERAL: NAD, well nourished and conversant  HEAD:  Atraumatic  EYES: EOM, PERRLA, conjunctiva pink and sclera white  ENT: No tonsillar erythema, exudates, or enlargement, moist mucous membranes, good dentition, no lesions  NECK: Supple, No JVD, normal thyroid, carotids with normal upstrokes and no bruits  CHEST/LUNOUS SYSTEM:  Dis            CBC Full  -  ( 28 Feb 2020 16:43 )  WBC Count : 6.04 K/uL  RBC Count : 3.37 M/uL  Hemoglobin : 10.3 g/dL  Hematocrit : 31.8 %  Platelet Count - Automated : 120 K/uL  Mean Cell Volume : 94.4 fl  Mean Cell Hemoglobin : 30.6 pg  Mean Cell Hemoglobin Concentration : 32.4 gm/dL  Auto Neutrophil # : 5.53 K/uL  Auto Lymphocyte # : 0.22 K/uL  Auto Monocyte # : 0.23 K/uL  Auto Eosinophil # : 0.01 K/uL  Auto Basophil # : 0.01 K/uL  Auto Neutrophil % : 91.5 %  Auto Lymphocyte % : 3.6 %  Auto Monocyte % : 3.8 %  Auto Eosinophil % : 0.2 %  Auto Basophil % : 0.2 %    02-28    132<L>  |  100  |  10  ----------------------------<  103<H>  3.6   |  21<L>  |  0.49<L>    Ca    8.1<L>      28 Feb 2020 07:26    TPro  5.3<L>  /  Alb  3.1<L>  /  TBili  0.4  /  DBili  x   /  AST  12  /  ALT  10  /  AlkPhos  45  02-28    LIVER FUNCTIONS - ( 28 Feb 2020 07:26 )  Alb: 3.1 g/dL / Pro: 5.3 g/dL / ALK PHOS: 45 U/L / ALT: 10 U/L / AST: 12 U/L / GGT: x           PT/INR - ( 28 Feb 2020 08:56 )   PT: 13.0 sec;   INR: 1.15 ratio         PTT - ( 28 Feb 2020 08:56 )  PTT:29.8 sec

## 2020-02-28 NOTE — PROGRESS NOTE ADULT - ATTENDING COMMENTS
The patient is medically stable, medically optimized and has no medical contraindication to surgery tomorrow as required. Exam time 40 minutes including > than 50 % for bedside discussion and counseling. No medical complications post-op to date and to proceed with physical therapy, as tolerated. Continues pulmonary toilet to lessen atelectasis, leg exercises as taught to lessen the risk of DVT and supervised pain medications for post-op pain control.

## 2020-02-28 NOTE — PHYSICAL THERAPY INITIAL EVALUATION ADULT - ACTIVE RANGE OF MOTION EXAMINATION, REHAB EVAL
Right UE Active ROM was WFL (within functional limits)/Left UE Active ROM was WFL (within functional limits)/Left LE Active ROM was WFL (within functional limits)/RLE hip and knee AROM not tested 2/2 post-op precaution RLE hip and knee AROM not tested 2/2 post-op precaution in knee immobilizer/Left UE Active ROM was WFL (within functional limits)/Right UE Active ROM was WFL (within functional limits)/Left LE Active ROM was WFL (within functional limits)

## 2020-02-28 NOTE — PROGRESS NOTE ADULT - SUBJECTIVE AND OBJECTIVE BOX
Ortho Progress Note    S: Patient seen and examined. No acute events overnight. Pain well controlled with current regimen. Denies lightheadedness/dizziness, CP/SOB. Tolerating diet.       O:  Physical Exam:  Gen: Laying in bed, NAD, alert and oriented.   Resp: Unlabored breathing  Ext: EHL/FHL/TA/Sol intact          + SILT DP/SP/RESENDIZ/Sa/Tib          +DP, extremity WWP    Vital Signs Last 24 Hrs  T(C): 36.9 (28 Feb 2020 05:39), Max: 37.7 (27 Feb 2020 15:45)  T(F): 98.4 (28 Feb 2020 05:39), Max: 99.9 (27 Feb 2020 15:45)  HR: 85 (28 Feb 2020 05:39) (73 - 87)  BP: 152/87 (28 Feb 2020 05:39) (117/77 - 164/96)  BP(mean): --  RR: 18 (28 Feb 2020 05:39) (18 - 18)  SpO2: 98% (28 Feb 2020 05:39) (94% - 98%)                          10.3   4.20  )-----------( 65       ( 27 Feb 2020 16:08 )             32.5                         9.2    3.76  )-----------( 76       ( 27 Feb 2020 03:32 )             29.4       02-27    137  |  101  |  16  ----------------------------<  115<H>  4.2   |  26  |  0.67        PT/INR - ( 27 Feb 2020 16:08 )   PT: 13.2 sec;   INR: 1.15 ratio         PTT - ( 27 Feb 2020 16:08 )  PTT:29.9 sec

## 2020-02-29 LAB
ANION GAP SERPL CALC-SCNC: 13 MMOL/L — SIGNIFICANT CHANGE UP (ref 5–17)
BUN SERPL-MCNC: 9 MG/DL — SIGNIFICANT CHANGE UP (ref 7–23)
CALCIUM SERPL-MCNC: 8.2 MG/DL — LOW (ref 8.4–10.5)
CHLORIDE SERPL-SCNC: 101 MMOL/L — SIGNIFICANT CHANGE UP (ref 96–108)
CO2 SERPL-SCNC: 23 MMOL/L — SIGNIFICANT CHANGE UP (ref 22–31)
CREAT SERPL-MCNC: 0.44 MG/DL — LOW (ref 0.5–1.3)
GLUCOSE SERPL-MCNC: 180 MG/DL — HIGH (ref 70–99)
HCT VFR BLD CALC: 26.3 % — LOW (ref 34.5–45)
HGB BLD-MCNC: 8.4 G/DL — LOW (ref 11.5–15.5)
MCHC RBC-ENTMCNC: 29.9 PG — SIGNIFICANT CHANGE UP (ref 27–34)
MCHC RBC-ENTMCNC: 31.9 GM/DL — LOW (ref 32–36)
MCV RBC AUTO: 93.6 FL — SIGNIFICANT CHANGE UP (ref 80–100)
NRBC # BLD: 0 /100 WBCS — SIGNIFICANT CHANGE UP (ref 0–0)
PLATELET # BLD AUTO: 102 K/UL — LOW (ref 150–400)
POTASSIUM SERPL-MCNC: 3.7 MMOL/L — SIGNIFICANT CHANGE UP (ref 3.5–5.3)
POTASSIUM SERPL-SCNC: 3.7 MMOL/L — SIGNIFICANT CHANGE UP (ref 3.5–5.3)
RBC # BLD: 2.81 M/UL — LOW (ref 3.8–5.2)
RBC # FLD: 15.5 % — HIGH (ref 10.3–14.5)
SODIUM SERPL-SCNC: 137 MMOL/L — SIGNIFICANT CHANGE UP (ref 135–145)
WBC # BLD: 4.96 K/UL — SIGNIFICANT CHANGE UP (ref 3.8–10.5)
WBC # FLD AUTO: 4.96 K/UL — SIGNIFICANT CHANGE UP (ref 3.8–10.5)

## 2020-02-29 RX ORDER — POLYETHYLENE GLYCOL 3350 17 G/17G
17 POWDER, FOR SOLUTION ORAL DAILY
Refills: 0 | Status: DISCONTINUED | OUTPATIENT
Start: 2020-02-29 | End: 2020-03-03

## 2020-02-29 RX ORDER — MULTIVIT WITH MIN/MFOLATE/K2 340-15/3 G
0.5 POWDER (GRAM) ORAL ONCE
Refills: 0 | Status: COMPLETED | OUTPATIENT
Start: 2020-02-29 | End: 2020-02-29

## 2020-02-29 RX ORDER — METHYLNALTREXONE BROMIDE 12 MG/.6ML
8 INJECTION, SOLUTION SUBCUTANEOUS ONCE
Refills: 0 | Status: COMPLETED | OUTPATIENT
Start: 2020-02-29 | End: 2020-02-29

## 2020-02-29 RX ADMIN — Medication 1 TABLET(S): at 15:52

## 2020-02-29 RX ADMIN — APIXABAN 5 MILLIGRAM(S): 2.5 TABLET, FILM COATED ORAL at 09:48

## 2020-02-29 RX ADMIN — Medication 400 MILLIGRAM(S): at 09:16

## 2020-02-29 RX ADMIN — Medication 100 MILLIGRAM(S): at 06:20

## 2020-02-29 RX ADMIN — OXYCODONE HYDROCHLORIDE 30 MILLIGRAM(S): 5 TABLET ORAL at 20:38

## 2020-02-29 RX ADMIN — PANTOPRAZOLE SODIUM 40 MILLIGRAM(S): 20 TABLET, DELAYED RELEASE ORAL at 06:24

## 2020-02-29 RX ADMIN — OXYCODONE HYDROCHLORIDE 30 MILLIGRAM(S): 5 TABLET ORAL at 08:15

## 2020-02-29 RX ADMIN — FENTANYL CITRATE 1 PATCH: 50 INJECTION INTRAVENOUS at 07:57

## 2020-02-29 RX ADMIN — OXYCODONE HYDROCHLORIDE 30 MILLIGRAM(S): 5 TABLET ORAL at 14:15

## 2020-02-29 RX ADMIN — OXYCODONE HYDROCHLORIDE 30 MILLIGRAM(S): 5 TABLET ORAL at 07:44

## 2020-02-29 RX ADMIN — OXYCODONE HYDROCHLORIDE 30 MILLIGRAM(S): 5 TABLET ORAL at 21:08

## 2020-02-29 RX ADMIN — FENTANYL CITRATE 1 PATCH: 50 INJECTION INTRAVENOUS at 20:22

## 2020-02-29 RX ADMIN — Medication 975 MILLIGRAM(S): at 22:27

## 2020-02-29 RX ADMIN — FENTANYL CITRATE 1 PATCH: 50 INJECTION INTRAVENOUS at 20:21

## 2020-02-29 RX ADMIN — METHYLNALTREXONE BROMIDE 8 MILLIGRAM(S): 12 INJECTION, SOLUTION SUBCUTANEOUS at 10:58

## 2020-02-29 RX ADMIN — Medication 975 MILLIGRAM(S): at 22:12

## 2020-02-29 RX ADMIN — APIXABAN 5 MILLIGRAM(S): 2.5 TABLET, FILM COATED ORAL at 22:12

## 2020-02-29 RX ADMIN — SODIUM CHLORIDE 75 MILLILITER(S): 9 INJECTION, SOLUTION INTRAVENOUS at 06:29

## 2020-02-29 RX ADMIN — FENTANYL CITRATE 1 PATCH: 50 INJECTION INTRAVENOUS at 07:58

## 2020-02-29 RX ADMIN — Medication 10 MILLIGRAM(S): at 18:57

## 2020-02-29 RX ADMIN — SODIUM CHLORIDE 1000 MILLILITER(S): 9 INJECTION INTRAMUSCULAR; INTRAVENOUS; SUBCUTANEOUS at 07:48

## 2020-02-29 RX ADMIN — ESCITALOPRAM OXALATE 20 MILLIGRAM(S): 10 TABLET, FILM COATED ORAL at 09:16

## 2020-02-29 RX ADMIN — OXYCODONE HYDROCHLORIDE 30 MILLIGRAM(S): 5 TABLET ORAL at 13:47

## 2020-02-29 NOTE — PROGRESS NOTE ADULT - ASSESSMENT
78 y/o FM  s/p ORIF right pathologic femur fracture POD#1, physical therapy for NWB right L/E in knee immobilizer, bowel regiment   Dalila Lynch PA-C  Orthopaedic Surgery  Team pager [phone]  [facility] [phone]  [phone]

## 2020-02-29 NOTE — OCCUPATIONAL THERAPY INITIAL EVALUATION ADULT - PERTINENT HX OF CURRENT PROBLEM, REHAB EVAL
Pt is a 79y Female PMHx multiple myeloma h/o L femur ORIF for pathologic fracture (5 years ago, OSH), R restoration modular jacques hip arthroplasty replacement presents s/p twisting injury c/o severe R knee pain and inability to ambulate.Found to displaced pathological fracture right distal femur. R knee CT: Pathologic, impacted fracture of the distal femoral metadiaphysis. Indeterminate 1 cm lesion in the lateral femoral condyle. Now s/p R femur ORIF 2/28.

## 2020-02-29 NOTE — PROGRESS NOTE ADULT - ASSESSMENT
Patient transferred to [facility] for orthopedics consultation. Patient found to have a distal right femur fx. The patient underwent plate and screw ORIF of the right distal femur on 02/28/20. She was seen by palliative care for pre-op pain control and will require continuation of pain control post operatively. She is presently post-op and hemodynamically stable. Patient states painb is well controlled. has been having issues with constipation from pain meds and immobility. would consider a trial of movantik . physical therapy as tolerated. DVT and GI prophylaxis as needed

## 2020-02-29 NOTE — OCCUPATIONAL THERAPY INITIAL EVALUATION ADULT - BALANCE TRAINING, PT EVAL
Pt will demonstrate improved static/dynamic standing balance by 1/2 level within 4 weeks to increase participation w/ self care tasks

## 2020-02-29 NOTE — OCCUPATIONAL THERAPY INITIAL EVALUATION ADULT - LIVES WITH, PROFILE
per pt lives w/ spouse in private home, access to home through garage w/ stair lift to bedroom and bathroom. Bathroom w/ walk in shower, bench, grab bars.

## 2020-02-29 NOTE — PROGRESS NOTE ADULT - SUBJECTIVE AND OBJECTIVE BOX
Patient is a 79y old  Female who presents with a chief complaint of pathologic fracture.  PMHx of MM on chemo p/w RLE pain.  Patient reports that she has had right knee pain for the past 5 days.  Today she twisted to get into her wheelchair and had severe right knee pain and felt a "crack." Patient was unable to ambulate. Called EMS and was taken to [facility] where she was diagnosed with a distal femur fracture.  Patient transferred to [facility] for orthopedics consultation. Patient found to have a distal right femur fx. The patient underwent plate and screw ORIF of the right distal femur on 02/28/20. She was seen by palliative care for pre-op pain control and will require continuation of pain control post operatively. She is presently post-op and hemodynamically stable. Patient complaining of constipation. has a hx of constipation due to pain meds in the past       MEDICATIONS  (STANDING):  acetaminophen   Tablet .. 975 milliGRAM(s) Oral every 8 hours  acyclovir   Oral Tab/Cap 400 milliGRAM(s) Oral two times a day  apixaban 5 milliGRAM(s) Oral two times a day  escitalopram 20 milliGRAM(s) Oral daily  fentaNYL   Patch  75 MICROgram(s)/Hr 1 Patch Transdermal every 72 hours  fentaNYL   Patch  75 MICROgram(s)/Hr 1 Patch Transdermal every 72 hours  lactated ringers. 1000 milliLiter(s) (75 mL/Hr) IV Continuous <Continuous>  melatonin 5 milliGRAM(s) Oral at bedtime  pantoprazole    Tablet 40 milliGRAM(s) Oral before breakfast  polyethylene glycol 3350 17 Gram(s) Oral daily  senna 2 Tablet(s) Oral at bedtime    MEDICATIONS  (PRN):  calcium carbonate    500 mG (Tums) Chewable 1 Tablet(s) Chew every 6 hours PRN Heartburn  HYDROmorphone  Injectable 0.5 milliGRAM(s) IV Push every 6 hours PRN severe breakthrough pain  metoclopramide Injectable 10 milliGRAM(s) IV Push every 6 hours PRN nausa or vomiting  oxyCODONE    IR 10 milliGRAM(s) Oral every 4 hours PRN Moderate Pain (4 - 6)  oxyCODONE    IR 5 milliGRAM(s) Oral every 4 hours PRN Mild Pain (1 - 3)  oxyCODONE    IR 30 milliGRAM(s) Oral every 4 hours PRN Severe Pain (7 - 10)          VITALS:   T(C): 37 (02-29-20 @ 22:00), Max: 37.5 (02-29-20 @ 14:31)  HR: 86 (02-29-20 @ 22:00) (70 - 86)  BP: 120/68 (02-29-20 @ 22:00) (118/75 - 143/89)  RR: 18 (02-29-20 @ 22:00) (18 - 18)  SpO2: 94% (02-29-20 @ 22:00) (94% - 98%)  Wt(kg): --    PHYSICAL EXAM:  GENERAL: NAD, well nourished and conversant  HEAD:  Atraumatic  EYES: EOM, PERRLA, conjunctiva pink and sclera white  ENT: No tonsillar erythema, exudates, or enlargement, moist mucous membranes, good dentition, no lesions  NECK: Supple, No JVD, normal thyroid, carotids with normal upstrokes and no bruits  CHEST/LUNG: Clear to auscultation bilaterally, No rales, rhonchi, wheezing, or rubs  HEART: Regular rate and rhythm, No murmurs, rubs, or gallops  ABDOMEN: Soft, nondistended, no masses, guarding, tenderness or rebound, bowel sounds present  EXTREMITIES:  2+ Peripheral Pulses, No clubbing, cyanosis, or edema.   LYMPH: No lymphadenopathy noted  SKIN: No rashes or lesions  NERVOUS SYSTEM:  Alert & Oriented X3, normal cognitive function. Motor Strength 5/5 right upper and right lower.  5/5 left upper and left lower extremities, DTRs 2+ intact and symmetric      LABS:        CBC Full  -  ( 29 Feb 2020 07:27 )  WBC Count : 4.96 K/uL  RBC Count : 2.81 M/uL  Hemoglobin : 8.4 g/dL  Hematocrit : 26.3 %  Platelet Count - Automated : 102 K/uL  Mean Cell Volume : 93.6 fl  Mean Cell Hemoglobin : 29.9 pg  Mean Cell Hemoglobin Concentration : 31.9 gm/dL  Auto Neutrophil # : x  Auto Lymphocyte # : x  Auto Monocyte # : x  Auto Eosinophil # : x  Auto Basophil # : x  Auto Neutrophil % : x  Auto Lymphocyte % : x  Auto Monocyte % : x  Auto Eosinophil % : x  Auto Basophil % : x    02-29    137  |  101  |  9   ----------------------------<  180<H>  3.7   |  23  |  0.44<L>    Ca    8.2<L>      29 Feb 2020 07:21    TPro  5.3<L>  /  Alb  3.1<L>  /  TBili  0.4  /  DBili  x   /  AST  12  /  ALT  10  /  AlkPhos  45  02-28    LIVER FUNCTIONS - ( 28 Feb 2020 07:26 )  Alb: 3.1 g/dL / Pro: 5.3 g/dL / ALK PHOS: 45 U/L / ALT: 10 U/L / AST: 12 U/L / GGT: x           PT/INR - ( 28 Feb 2020 08:56 )   PT: 13.0 sec;   INR: 1.15 ratio         PTT - ( 28 Feb 2020 08:56 )  PTT:29.8 sec    CAPILLARY BLOOD GLUCOSE

## 2020-02-29 NOTE — PROGRESS NOTE ADULT - SUBJECTIVE AND OBJECTIVE BOX
Patient is a 79y old  Female who presents with a chief complaint of Right distal femur fracture   Patient s/p ORIF right pathologic femur fracture POD#1  Patient resting without complaints.  No chest pain, SOB, N/V.    T(C): 36.7 (02-29-20 @ 05:47), Max: 37.2 (02-28-20 @ 09:44)  HR: 70 (02-29-20 @ 05:47) (65 - 90)  BP: 136/69 (02-29-20 @ 05:47) (115/70 - 194/83)  RR: 18 (02-29-20 @ 05:47) (16 - 18)  SpO2: 98% (02-29-20 @ 05:47) (94% - 98%)    Exam:  Alert and Oriented, No Acute Distress  Cards: +S1/S2, RRR  Pulm: CTAB  Lower Extremities: Right L/E, moving digits sensation intact  Calves Soft, Non-tender bilaterally  +PF/DF/EHL/FHL  SILT  +DP Pulse                      10.3   6.04  )-----------( 120      ( 28 Feb 2020 16:43 )             31.8    02-29  137  |  101  |  9   ----------------------------<  180<H>  3.7   |  23  |  0.44<L>  Ca    8.2<L>      29 Feb 2020 07:21  TPro  5.3<L>  /  Alb  3.1<L>  /  TBili  0.4  /  DBili  x   /  AST  12  /  ALT  10  /  AlkPhos  45  02-28

## 2020-03-01 LAB
ANION GAP SERPL CALC-SCNC: 10 MMOL/L — SIGNIFICANT CHANGE UP (ref 5–17)
BUN SERPL-MCNC: 9 MG/DL — SIGNIFICANT CHANGE UP (ref 7–23)
CALCIUM SERPL-MCNC: 8.1 MG/DL — LOW (ref 8.4–10.5)
CHLORIDE SERPL-SCNC: 106 MMOL/L — SIGNIFICANT CHANGE UP (ref 96–108)
CO2 SERPL-SCNC: 25 MMOL/L — SIGNIFICANT CHANGE UP (ref 22–31)
CREAT SERPL-MCNC: 0.5 MG/DL — SIGNIFICANT CHANGE UP (ref 0.5–1.3)
GLUCOSE SERPL-MCNC: 95 MG/DL — SIGNIFICANT CHANGE UP (ref 70–99)
HCT VFR BLD CALC: 22.9 % — LOW (ref 34.5–45)
HGB BLD-MCNC: 7.2 G/DL — LOW (ref 11.5–15.5)
MCHC RBC-ENTMCNC: 29.8 PG — SIGNIFICANT CHANGE UP (ref 27–34)
MCHC RBC-ENTMCNC: 31.4 GM/DL — LOW (ref 32–36)
MCV RBC AUTO: 94.6 FL — SIGNIFICANT CHANGE UP (ref 80–100)
NRBC # BLD: 0 /100 WBCS — SIGNIFICANT CHANGE UP (ref 0–0)
PLATELET # BLD AUTO: 88 K/UL — LOW (ref 150–400)
POTASSIUM SERPL-MCNC: 3.5 MMOL/L — SIGNIFICANT CHANGE UP (ref 3.5–5.3)
POTASSIUM SERPL-SCNC: 3.5 MMOL/L — SIGNIFICANT CHANGE UP (ref 3.5–5.3)
RBC # BLD: 2.42 M/UL — LOW (ref 3.8–5.2)
RBC # FLD: 15.8 % — HIGH (ref 10.3–14.5)
SODIUM SERPL-SCNC: 141 MMOL/L — SIGNIFICANT CHANGE UP (ref 135–145)
WBC # BLD: 3.18 K/UL — LOW (ref 3.8–10.5)
WBC # FLD AUTO: 3.18 K/UL — LOW (ref 3.8–10.5)

## 2020-03-01 PROCEDURE — 71045 X-RAY EXAM CHEST 1 VIEW: CPT | Mod: 26

## 2020-03-01 RX ADMIN — POLYETHYLENE GLYCOL 3350 17 GRAM(S): 17 POWDER, FOR SOLUTION ORAL at 11:38

## 2020-03-01 RX ADMIN — PANTOPRAZOLE SODIUM 40 MILLIGRAM(S): 20 TABLET, DELAYED RELEASE ORAL at 05:32

## 2020-03-01 RX ADMIN — OXYCODONE HYDROCHLORIDE 30 MILLIGRAM(S): 5 TABLET ORAL at 12:38

## 2020-03-01 RX ADMIN — FENTANYL CITRATE 1 PATCH: 50 INJECTION INTRAVENOUS at 07:24

## 2020-03-01 RX ADMIN — APIXABAN 5 MILLIGRAM(S): 2.5 TABLET, FILM COATED ORAL at 21:46

## 2020-03-01 RX ADMIN — ESCITALOPRAM OXALATE 20 MILLIGRAM(S): 10 TABLET, FILM COATED ORAL at 08:00

## 2020-03-01 RX ADMIN — APIXABAN 5 MILLIGRAM(S): 2.5 TABLET, FILM COATED ORAL at 09:54

## 2020-03-01 RX ADMIN — FENTANYL CITRATE 1 PATCH: 50 INJECTION INTRAVENOUS at 19:14

## 2020-03-01 RX ADMIN — SENNA PLUS 2 TABLET(S): 8.6 TABLET ORAL at 21:46

## 2020-03-01 RX ADMIN — FENTANYL CITRATE 1 PATCH: 50 INJECTION INTRAVENOUS at 19:15

## 2020-03-01 RX ADMIN — Medication 10 MILLIGRAM(S): at 13:43

## 2020-03-01 RX ADMIN — OXYCODONE HYDROCHLORIDE 30 MILLIGRAM(S): 5 TABLET ORAL at 12:08

## 2020-03-01 RX ADMIN — Medication 400 MILLIGRAM(S): at 09:54

## 2020-03-01 NOTE — PROGRESS NOTE ADULT - ASSESSMENT
Patient transferred to [facility] for orthopedics consultation. Patient found to have a distal right femur fx. The patient underwent plate and screw ORIF of the right distal femur on 02/28/20. She was seen by palliative care for pre-op pain control and will require continuation of pain control post operatively. She is presently post-op and hemodynamically stable. Patient states painb is well controlled. has been having issues with constipation from pain meds and immobility. Patient treated with relistor IV with good results.  . Will continue physical therapy as tolerated. DVT and GI prophylaxis as needed, DC planning to rehab as per Ortho.

## 2020-03-01 NOTE — PROVIDER CONTACT NOTE (MEDICATION) - ASSESSMENT
Pt called RN to bedside w/ c/o sweating through gown. Pt also w/expiratory wheezing lung sounds. Cont p.ox in place & O2 sat 96% & HR 80s. Pt denies SOB or difficulty breathing.

## 2020-03-01 NOTE — PROGRESS NOTE ADULT - SUBJECTIVE AND OBJECTIVE BOX
Patient is a 79y old  Female who presents with a chief complaint of Right pathologic femur fracture   Patient s/p ORIF right pathologic femur fracture POD#2  Patient resting without complaints.  No chest pain, SOB, N/V.    T(C): 36.8 (03-01-20 @ 04:38), Max: 37.5 (02-29-20 @ 14:31)  HR: 74 (03-01-20 @ 04:38) (74 - 91)  BP: 123/66 (03-01-20 @ 04:38) (118/75 - 143/89)  RR: 18 (03-01-20 @ 04:38) (18 - 18)  SpO2: 95% (03-01-20 @ 04:38) (94% - 97%)    Exam:  Alert and Oriented, No Acute Distress  Cards: +S1/S2, RRR  Pulm: CTAB  Lower Extremities:  Right L/E: incision CDI in KI, HMV 20/20c  +PF/DF/EHL/FHL  SILT  +DP Pulse                        7.2    3.18  )-----------( 88       ( 01 Mar 2020 06:11 )             22.9    03-01  141  |  106  |  9   ----------------------------<  95  3.5   |  25  |  0.50  Ca    8.1<L>      01 Mar 2020 06:11  TPro  5.3<L>  /  Alb  3.1<L>  /  TBili  0.4  /  DBili  x   /  AST  12  /  ALT  10  /  AlkPhos  45  02-28

## 2020-03-01 NOTE — PROGRESS NOTE ADULT - SUBJECTIVE AND OBJECTIVE BOX
Patient is a 79y old  Female who presents with a chief complaint of pathologic fracture.  PMHx of MM on chemo p/w RLE pain.  Patient reports that she has had right knee pain for the past 5 days.  Today she twisted to get into her wheelchair and had severe right knee pain and felt a "crack." Patient was unable to ambulate. Called EMS and was taken to [facility] where she was diagnosed with a distal femur fracture.  Patient transferred to [facility] for orthopedics consultation. Patient found to have a distal right femur fx. The patient underwent plate and screw ORIF of the right distal femur on 02/28/20. She was seen by palliative care for pre-op pain control and will require continuation of pain control post operatively. She is presently post-op and hemodynamically stable. Patient complaining of constipation. Patient had a large BM last night. pain has improved.     MEDICATIONS  (STANDING):  acetaminophen   Tablet .. 975 milliGRAM(s) Oral every 8 hours  acyclovir   Oral Tab/Cap 400 milliGRAM(s) Oral two times a day  apixaban 5 milliGRAM(s) Oral two times a day  escitalopram 20 milliGRAM(s) Oral daily  fentaNYL   Patch  75 MICROgram(s)/Hr 1 Patch Transdermal every 72 hours  fentaNYL   Patch  75 MICROgram(s)/Hr 1 Patch Transdermal every 72 hours  lactated ringers. 1000 milliLiter(s) (75 mL/Hr) IV Continuous <Continuous>  melatonin 5 milliGRAM(s) Oral at bedtime  pantoprazole    Tablet 40 milliGRAM(s) Oral before breakfast  polyethylene glycol 3350 17 Gram(s) Oral daily  senna 2 Tablet(s) Oral at bedtime    MEDICATIONS  (PRN):  calcium carbonate    500 mG (Tums) Chewable 1 Tablet(s) Chew every 6 hours PRN Heartburn  HYDROmorphone  Injectable 0.5 milliGRAM(s) IV Push every 6 hours PRN severe breakthrough pain  metoclopramide Injectable 10 milliGRAM(s) IV Push every 6 hours PRN nausa or vomiting  oxyCODONE    IR 10 milliGRAM(s) Oral every 4 hours PRN Moderate Pain (4 - 6)  oxyCODONE    IR 5 milliGRAM(s) Oral every 4 hours PRN Mild Pain (1 - 3)  oxyCODONE    IR 30 milliGRAM(s) Oral every 4 hours PRN Severe Pain (7 - 10)          VITALS:   T(C): 37.3 (03-01-20 @ 22:19), Max: 37.3 (03-01-20 @ 09:16)  HR: 87 (03-01-20 @ 22:19) (74 - 91)  BP: 114/70 (03-01-20 @ 22:19) (114/70 - 147/81)  RR: 18 (03-01-20 @ 22:19) (18 - 18)  SpO2: 98% (03-01-20 @ 22:19) (94% - 98%)  Wt(kg): --    PHYSICAL EXAM:  GENERAL: NAD, well nourished and conversant  HEAD:  Atraumatic  EYES: EOM, PERRLA, conjunctiva pink and sclera white  ENT: No tonsillar erythema, exudates, or enlargement, moist mucous membranes, good dentition, no lesions  NECK: Supple, No JVD, normal thyroid, carotids with normal upstrokes and no bruits  CHEST/LUNG: Clear to auscultation bilaterally, No rales, rhonchi, wheezing, or rubs  HEART: Regular rate and rhythm, No murmurs, rubs, or gallops  ABDOMEN: Soft, nondistended, no masses, guarding, tenderness or rebound, bowel sounds present  EXTREMITIES:  2+ Peripheral Pulses, No clubbing, cyanosis, or edema.   LYMPH: No lymphadenopathy noted  SKIN: No rashes or lesions  NERVOUS SYSTEM:  Alert & Oriented X3, normal cognitive function. Motor Strength 5/5 right upper and right lower.  5/5 left upper and left lower extremities, DTRs 2+ intact and symmetric    LABS:        CBC Full  -  ( 01 Mar 2020 06:11 )  WBC Count : 3.18 K/uL  RBC Count : 2.42 M/uL  Hemoglobin : 7.2 g/dL  Hematocrit : 22.9 %  Platelet Count - Automated : 88 K/uL  Mean Cell Volume : 94.6 fl  Mean Cell Hemoglobin : 29.8 pg  Mean Cell Hemoglobin Concentration : 31.4 gm/dL  Auto Neutrophil # : x  Auto Lymphocyte # : x  Auto Monocyte # : x  Auto Eosinophil # : x  Auto Basophil # : x  Auto Neutrophil % : x  Auto Lymphocyte % : x  Auto Monocyte % : x  Auto Eosinophil % : x  Auto Basophil % : x    03-01    141  |  106  |  9   ----------------------------<  95  3.5   |  25  |  0.50    Ca    8.1<L>      01 Mar 2020 06:11            CAPILLARY BLOOD GLUCOSE          RADIOLOGY & ADDITIONAL TESTS:

## 2020-03-01 NOTE — PROGRESS NOTE ADULT - ASSESSMENT
80 y/o FM s/p right femur ORIF 2' pathologic femur fracture POD#2, physical therapy for NWB right L/E in KI  Dalila Lynch PA-C  Orthopaedic Surgery  Team pager [phone]  [facility] [phone]  [phone]

## 2020-03-02 ENCOUNTER — TRANSCRIPTION ENCOUNTER (OUTPATIENT)
Age: 80
End: 2020-03-02

## 2020-03-02 LAB
ANION GAP SERPL CALC-SCNC: 9 MMOL/L — SIGNIFICANT CHANGE UP (ref 5–17)
BLD GP AB SCN SERPL QL: NEGATIVE — SIGNIFICANT CHANGE UP
BUN SERPL-MCNC: 9 MG/DL — SIGNIFICANT CHANGE UP (ref 7–23)
CALCIUM SERPL-MCNC: 8.3 MG/DL — LOW (ref 8.4–10.5)
CHLORIDE SERPL-SCNC: 103 MMOL/L — SIGNIFICANT CHANGE UP (ref 96–108)
CO2 SERPL-SCNC: 26 MMOL/L — SIGNIFICANT CHANGE UP (ref 22–31)
CREAT SERPL-MCNC: 0.43 MG/DL — LOW (ref 0.5–1.3)
GLUCOSE SERPL-MCNC: 100 MG/DL — HIGH (ref 70–99)
HCT VFR BLD CALC: 28.7 % — LOW (ref 34.5–45)
HGB BLD-MCNC: 9.1 G/DL — LOW (ref 11.5–15.5)
MCHC RBC-ENTMCNC: 29.2 PG — SIGNIFICANT CHANGE UP (ref 27–34)
MCHC RBC-ENTMCNC: 31.7 GM/DL — LOW (ref 32–36)
MCV RBC AUTO: 92 FL — SIGNIFICANT CHANGE UP (ref 80–100)
NRBC # BLD: 0 /100 WBCS — SIGNIFICANT CHANGE UP (ref 0–0)
PLATELET # BLD AUTO: 91 K/UL — LOW (ref 150–400)
POTASSIUM SERPL-MCNC: 4 MMOL/L — SIGNIFICANT CHANGE UP (ref 3.5–5.3)
POTASSIUM SERPL-SCNC: 4 MMOL/L — SIGNIFICANT CHANGE UP (ref 3.5–5.3)
RBC # BLD: 3.12 M/UL — LOW (ref 3.8–5.2)
RBC # FLD: 17.2 % — HIGH (ref 10.3–14.5)
RH IG SCN BLD-IMP: POSITIVE — SIGNIFICANT CHANGE UP
SODIUM SERPL-SCNC: 138 MMOL/L — SIGNIFICANT CHANGE UP (ref 135–145)
WBC # BLD: 3.11 K/UL — LOW (ref 3.8–10.5)
WBC # FLD AUTO: 3.11 K/UL — LOW (ref 3.8–10.5)

## 2020-03-02 PROCEDURE — 99233 SBSQ HOSP IP/OBS HIGH 50: CPT

## 2020-03-02 PROCEDURE — 73552 X-RAY EXAM OF FEMUR 2/>: CPT | Mod: 26,RT

## 2020-03-02 RX ADMIN — Medication 975 MILLIGRAM(S): at 21:11

## 2020-03-02 RX ADMIN — Medication 400 MILLIGRAM(S): at 21:12

## 2020-03-02 RX ADMIN — OXYCODONE HYDROCHLORIDE 30 MILLIGRAM(S): 5 TABLET ORAL at 11:15

## 2020-03-02 RX ADMIN — OXYCODONE HYDROCHLORIDE 30 MILLIGRAM(S): 5 TABLET ORAL at 18:16

## 2020-03-02 RX ADMIN — Medication 400 MILLIGRAM(S): at 09:02

## 2020-03-02 RX ADMIN — APIXABAN 5 MILLIGRAM(S): 2.5 TABLET, FILM COATED ORAL at 09:01

## 2020-03-02 RX ADMIN — APIXABAN 5 MILLIGRAM(S): 2.5 TABLET, FILM COATED ORAL at 21:11

## 2020-03-02 RX ADMIN — FENTANYL CITRATE 1 PATCH: 50 INJECTION INTRAVENOUS at 19:52

## 2020-03-02 RX ADMIN — Medication 10 MILLIGRAM(S): at 05:57

## 2020-03-02 RX ADMIN — FENTANYL CITRATE 1 PATCH: 50 INJECTION INTRAVENOUS at 21:23

## 2020-03-02 RX ADMIN — FENTANYL CITRATE 1 PATCH: 50 INJECTION INTRAVENOUS at 07:32

## 2020-03-02 RX ADMIN — ESCITALOPRAM OXALATE 20 MILLIGRAM(S): 10 TABLET, FILM COATED ORAL at 09:01

## 2020-03-02 RX ADMIN — OXYCODONE HYDROCHLORIDE 30 MILLIGRAM(S): 5 TABLET ORAL at 23:24

## 2020-03-02 RX ADMIN — FENTANYL CITRATE 1 PATCH: 50 INJECTION INTRAVENOUS at 21:11

## 2020-03-02 RX ADMIN — OXYCODONE HYDROCHLORIDE 30 MILLIGRAM(S): 5 TABLET ORAL at 18:46

## 2020-03-02 RX ADMIN — Medication 975 MILLIGRAM(S): at 16:07

## 2020-03-02 RX ADMIN — Medication 975 MILLIGRAM(S): at 21:41

## 2020-03-02 RX ADMIN — OXYCODONE HYDROCHLORIDE 30 MILLIGRAM(S): 5 TABLET ORAL at 11:45

## 2020-03-02 RX ADMIN — POLYETHYLENE GLYCOL 3350 17 GRAM(S): 17 POWDER, FOR SOLUTION ORAL at 21:12

## 2020-03-02 RX ADMIN — OXYCODONE HYDROCHLORIDE 30 MILLIGRAM(S): 5 TABLET ORAL at 22:54

## 2020-03-02 RX ADMIN — PANTOPRAZOLE SODIUM 40 MILLIGRAM(S): 20 TABLET, DELAYED RELEASE ORAL at 05:45

## 2020-03-02 RX ADMIN — Medication 975 MILLIGRAM(S): at 15:37

## 2020-03-02 RX ADMIN — FENTANYL CITRATE 1 PATCH: 50 INJECTION INTRAVENOUS at 21:10

## 2020-03-02 NOTE — PROGRESS NOTE ADULT - SUBJECTIVE AND OBJECTIVE BOX
Patient is a 79y old  Female who presents with a chief complaint of pathologic fracture.  PMHx of MM on chemo p/w RLE pain.  Patient reports that she has had right knee pain for the past 5 days.  Today she twisted to get into her wheelchair and had severe right knee pain and felt a "crack." Patient was unable to ambulate. Called EMS and was taken to [facility] where she was diagnosed with a distal femur fracture.  Patient transferred to [facility] for orthopedics consultation. Patient found to have a distal right femur fx. The patient underwent plate and screw ORIF of the right distal femur on 02/28/20. She was seen by palliative care for pre-op pain control and will require continuation of pain control post operatively. She is presently post-op and hemodynamically stable. Patient complaining of constipation. Patient had a large BM last night. pain has improved. Patient found to be anemic and received a transfusion .        MEDICATIONS  (STANDING):  acetaminophen   Tablet .. 975 milliGRAM(s) Oral every 8 hours  acyclovir   Oral Tab/Cap 400 milliGRAM(s) Oral two times a day  apixaban 5 milliGRAM(s) Oral two times a day  escitalopram 20 milliGRAM(s) Oral daily  fentaNYL   Patch  75 MICROgram(s)/Hr 1 Patch Transdermal every 72 hours  fentaNYL   Patch  75 MICROgram(s)/Hr 1 Patch Transdermal every 72 hours  lactated ringers. 1000 milliLiter(s) (75 mL/Hr) IV Continuous <Continuous>  melatonin 5 milliGRAM(s) Oral at bedtime  pantoprazole    Tablet 40 milliGRAM(s) Oral before breakfast  polyethylene glycol 3350 17 Gram(s) Oral daily  senna 2 Tablet(s) Oral at bedtime    MEDICATIONS  (PRN):  calcium carbonate    500 mG (Tums) Chewable 1 Tablet(s) Chew every 6 hours PRN Heartburn  HYDROmorphone  Injectable 0.5 milliGRAM(s) IV Push every 6 hours PRN severe breakthrough pain  metoclopramide Injectable 10 milliGRAM(s) IV Push every 6 hours PRN nausa or vomiting  oxyCODONE    IR 10 milliGRAM(s) Oral every 4 hours PRN Moderate Pain (4 - 6)  oxyCODONE    IR 5 milliGRAM(s) Oral every 4 hours PRN Mild Pain (1 - 3)  oxyCODONE    IR 30 milliGRAM(s) Oral every 4 hours PRN Severe Pain (7 - 10)          VITALS:   T(C): 36.6 (03-02-20 @ 18:00), Max: 37.3 (03-01-20 @ 22:19)  HR: 65 (03-02-20 @ 18:00) (65 - 88)  BP: 150/85 (03-02-20 @ 18:00) (112/69 - 150/85)  RR: 18 (03-02-20 @ 18:00) (18 - 18)  SpO2: 95% (03-02-20 @ 18:00) (95% - 98%)  Wt(kg): --    PHYSICAL EXAM:  GENERAL: NAD, well nourished and conversant  HEAD:  Atraumatic  EYES: EOM, PERRLA, conjunctiva pink and sclera white  ENT: No tonsillar erythema, exudates, or enlargement, moist mucous membranes, good dentition, no lesions  NECK: Supple, No JVD, normal thyroid, carotids with normal upstrokes and no bruits  CHEST/LUNG: Clear to auscultation bilaterally, No rales, rhonchi, wheezing, or rubs  HEART: Regular rate and rhythm, No murmurs, rubs, or gallops  ABDOMEN: Soft, nondistended, no masses, guarding, tenderness or rebound, bowel sounds present  EXTREMITIES:  2+ Peripheral Pulses, No clubbing, cyanosis, or edema.   LYMPH: No lymphadenopathy noted  SKIN: No rashes or lesions  NERVOUS SYSTEM:  Alert & Oriented X3, normal cognitive function. Motor Strength 5/5 right upper and right lower.  5/5 left upper and left lower extremities, DTRs 2+ intact and symmetric    LABS:        CBC Full  -  ( 02 Mar 2020 06:03 )  WBC Count : 3.11 K/uL  RBC Count : 3.12 M/uL  Hemoglobin : 9.1 g/dL  Hematocrit : 28.7 %  Platelet Count - Automated : 91 K/uL  Mean Cell Volume : 92.0 fl  Mean Cell Hemoglobin : 29.2 pg  Mean Cell Hemoglobin Concentration : 31.7 gm/dL  Auto Neutrophil # : x  Auto Lymphocyte # : x  Auto Monocyte # : x  Auto Eosinophil # : x  Auto Basophil # : x  Auto Neutrophil % : x  Auto Lymphocyte % : x  Auto Monocyte % : x  Auto Eosinophil % : x  Auto Basophil % : x    03-02    138  |  103  |  9   ----------------------------<  100<H>  4.0   |  26  |  0.43<L>    Ca    8.3<L>      02 Mar 2020 06:03            CAPILLARY BLOOD GLUCOSE          RADIOLOGY & ADDITIONAL TESTS:

## 2020-03-02 NOTE — DISCHARGE NOTE PROVIDER - NSDCFUADDINST_GEN_ALL_CORE_FT
Follow up with Dr. Ernandez upon discharge from Rehab- please call to make appointment.   Activity: NON weight bearing on right leg in knee immobilizer.  DVT Prophylaxis: Eliquis 5mg oral twice daily for six weeks to prevent blood clots.  Dressing: Keep clean and dry. Rehab staff to remove postoperative day #14 and remove staples if applicable and apply steristrips.

## 2020-03-02 NOTE — PROGRESS NOTE ADULT - ASSESSMENT
Patient transferred to [facility] for orthopedics consultation. Patient found to have a distal right femur fx. The patient underwent plate and screw ORIF of the right distal femur on 02/28/20. She was seen by palliative care for pre-op pain control and will require continuation of pain control post operatively. She is presently post-op and hemodynamically stable. Patient states painb is well controlled. has been having issues with constipation from pain meds and immobility. Patient treated with relistor IV with good results.  Will continue physical therapy as tolerated. DVT and GI prophylaxis as needed,  follow H&H and transfuse as needed. DC planning to rehab as per Ortho.

## 2020-03-02 NOTE — CHART NOTE - NSCHARTNOTEFT_GEN_A_CORE
Ortho PA Note      As per Dr. Ernandez,  HMV drain removed. Incision C/D/I with staples in place.  Aquacel dressing placed.  Also as per Dr. Ernandez, patient may be WBAT on RLE without KI and may use rolling walker.        ARISTEO Caraballo  Orthopedic Surgery  [phone]

## 2020-03-02 NOTE — DISCHARGE NOTE PROVIDER - NSDCCPCAREPLAN_GEN_ALL_CORE_FT
PRINCIPAL DISCHARGE DIAGNOSIS  Diagnosis: Pathologic fracture of femur  Assessment and Plan of Treatment:

## 2020-03-02 NOTE — PROGRESS NOTE ADULT - SUBJECTIVE AND OBJECTIVE BOX
HPI: 79F with PMH of MM (s/p L femoral ORIF for path fracture 2015) here with twisting injury, severe R knee pain, and inability to ambulate. Was found to have a R distal MEDICATIONS  (STANDING):  acetaminophen   Tablet .. 975 milliGRAM(s) Oral every 8 hours  acyclovir   Oral Tab/Cap 400 milliGRAM(s) Oral two times a day  apixaban 5 milliGRAM(s) Oral two times a day  escitalopram 20 milliGRAM(s) Oral daily  fentaNYL   Patch  75 MICROgram(s)/Hr 1 Patch Transdermal every 72 hours  fentaNYL   Patch  75 MICROgram(s)/Hr 1 Patch Transdermal every 72 hours  lactated ringers. 1000 milliLiter(s) (75 mL/Hr) IV Continuous <Continuous>  melatonin 5 milliGRAM(s) Oral at bedtime  pantoprazole    Tablet 40 milliGRAM(s) Oral before breakfast  polyethylene glycol 3350 17 Gram(s) Oral daily  senna 2 Tablet(s) Oral at bedtime    MEDICATIONS  (PRN):  calcium carbonate    500 mG (Tums) Chewable 1 Tablet(s) Chew every 6 hours PRN Heartburn  HYDROmorphone  Injectable 0.5 milliGRAM(s) IV Push every 6 hours PRN severe breakthrough pain  metoclopramide Injectable 10 milliGRAM(s) IV Push every 6 hours PRN nausa or vomiting  oxyCODONE    IR 10 milliGRAM(s) Oral every 4 hours PRN Moderate Pain (4 - 6)  oxyCODONE    IR 5 milliGRAM(s) Oral every 4 hours PRN Mild Pain (1 - 3)  oxyCODONE    IR 30 milliGRAM(s) Oral every 4 hours PRN Severe Pain (7 - 10)  femur fracture and was transferred to [facility] for surgical management. Palliative care called to help with pain management. Patient is scheduled for ORIF.    ADVANCE DIRECTIVES:    DNR  Yes  MOLST  [ ]  Living Will  [ ]   DECISION MAKER(s):  [ ] Health Care Proxy(s)  [ ] Surrogate(s)  [ ] Guardian           Name(s): Phone Number(s):     BASELINE (I)ADL(s) (prior to admission):  Addison: [X ]Total  [ ] Moderate [ ]Dependent    Allergies    gadolinium (Rash)  No Known Drug Allergies    Intolerances        PRESENT SYMPTOMS: [ ]Unable to obtain due to poor mentation   Source if other than patient:  [ ]Family   [ ]Team     Pain: [X ]yes [ ]no  QOL impact -   Location -   R knee                 Aggravating factors -  Quality - aching  Radiation - none  Timing- constant  Severity (0-10 scale): 10/10  Minimal acceptable level (0-10 scale):     PAIN AD Score:     [email or web address] (press ctrl +  left click to view)    Dyspnea:                           [ ]Mild [ ]Moderate [ ]Severe  Anxiety:                             [ ]Mild [ ]Moderate [ ]Severe  Fatigue:                             [ ]Mild [ ]Moderate [ ]Severe  Nausea:                             [ ]Mild [ ]Moderate [ ]Severe  Loss of appetite:              [ ]Mild [ ]Moderate [ ]Severe  Constipation:                    [ ]Mild [ ]Moderate [ ]Severe    Other Symptoms:  [X ]All other review of systems negative     Palliative Performance Status Version 2: 70+%    http://npcrc.org/files/news/palliative_performance_scale_ppsv2.pdf    PHYSICAL EXAM:  Vital Signs Last 24 Hrs  T(C): 36.9 (27 Feb 2020 07:59), Max: 37.7 (26 Feb 2020 20:20)  T(F): 98.5 (27 Feb 2020 07:59), Max: 99.9 (26 Feb 2020 20:20)  HR: 76 (27 Feb 2020 07:59) (70 - 84)  BP: 150/66 (27 Feb 2020 07:59) (103/63 - 150/66)  BP(mean): --  RR: 18 (27 Feb 2020 07:59) (18 - 20)  SpO2: 97% (27 Feb 2020 07:59) (94% - 99%) I&O's Summary    26 Feb 2020 07:01  -  27 Feb 2020 07:00  --------------------------------------------------------  IN: 990 mL / OUT: 1700 mL / NET: -710 mL    27 Feb 2020 07:01  -  27 Feb 2020 11:16  --------------------------------------------------------  IN: 25 mL / OUT: 0 mL / NET: 25 mL      Limited exam for patient comfort  GENERAL:  [X ]Alert  [ ]Oriented x   [ ]Lethargic  [ ]Cachexia  [ ]Unarousable  [ X]Verbal  [ ]Non-Verbal  Behavioral:   [ X] Normal [ ] Anxiety  [ ] Delirium [ ] Agitation [ ] Other  HEENT:  [X ]Normal   [ ]Dry mouth   [ ]ET Tube/Trach  [ ]Oral lesions  PULMONARY:   [ ]Clear [ ]Tachypnea  [ ]Audible excessive secretions [ ] No labored breathing  [ ]Rhonchi        [ ]Right [ ]Left [ ]Bilateral  [ ]Crackles        [ ]Right [ ]Left [ ]Bilateral  [ ]Wheezing     [ ]Right [ ]Left [ ]Bilatera  [ ]Diminished breath sounds [ ]right [ ]left [ ]bilateral  CARDIOVASCULAR:    [ ]Regular [ ]Irregular [ ]Tachy  [ ]Dionisio [ ]Murmur [ ]Other  GASTROINTESTINAL:  [ ]Soft  [ ]Distended   [ ]+BS  [ ]Non tender [ ]Tender  [ ]PEG [ ]OGT/ NGT  Last BM:   GENITOURINARY:  [ ]Normal [ ] Incontinent   [ ]Oliguria/Anuria   [ X]Martinez not present  MUSCULOSKELETAL:   [ ]Normal   [ ]Weakness  [ ]Bed/Wheelchair bound [ ]Edema  NEUROLOGIC:   [ X]No focal deficits  [ ]Cognitive impairment  [ ]Dysphagia [ ]Dysarthria [ ]Paresis [ ]Other   SKIN:   [ ]Normal    [ ]Rash  [ ]Pressure ulcer(s)       Present on admission [ ]y [ ]n    CRITICAL CARE:  [ ] Shock Present  [ ]Septic [ ]Cardiogenic [ ]Neurologic [ ]Hypovolemic  [ ]  Vasopressors [ ]  Inotropes   [ ]Respiratory failure present [ ]Mechanical ventilation [ ]Non-invasive ventilatory support [ ]High flow  [ ]Acute  [ ]Chronic [ ]Hypoxic  [ ]Hypercarbic [ ]Other  [ ]Other organ failure     LABS: reviewed                                   9.1    3.11  )-----------( 91       ( 02 Mar 2020 06:03 )             28.7     03-02    138  |  103  |  9   ----------------------------<  100<H>  4.0   |  26  |  0.43<L>    Ca    8.3<L>      02 Mar 2020 06:03          RADIOLOGY & ADDITIONAL STUDIES:  CT R knee  1. Pathologic, impacted fracture of the distal femoral metadiaphysis, secondary to an underlying marrow based soft tissue mass.  2. Lipohemarthrosis is likely related to the relation of the fracture to the superior margin of the patellofemoral compartment.  3. Indeterminate 1 cm lesion in the lateral femoral condyle.      PROTEIN CALORIE MALNUTRITION PRESENT: [ ]mild [ ]moderate [ ]severe [ ]underweight [ ]morbid obesity  https://www.andeal.org/vault/2440/web/files/ONC/Table_Clinical%20Characteristics%20to%20Document%20Malnutrition-White%20JV%20et%20al%884581.pdf    Height (cm): 165.1 (02-25-20 @ 23:39), 149.9 (12-10-19 @ 11:12)  Weight (kg): 64.9 (02-25-20 @ 23:39), 66.9 (12-10-19 @ 11:12)  BMI (kg/m2): 23.8 (02-25-20 @ 23:39), 29.8 (12-10-19 @ 11:12)    [ ]PPSV2 < or = to 30% [ ]significant weight loss  [ ]poor nutritional intake  [ ]anasarca     Albumin, Serum: 4.0 g/dL (02-26-20 @ 02:49)   [ ]Artificial Nutrition      REFERRALS:   [ ]Chaplaincy  [ ]Hospice  [ ]Child Life  [ ]Social Work  [ ]Case management [ ]Holistic Therapy     Goals of Care Document:     ______________  Eliseo Albert MD   of Geriatric and Palliative Medicine  [facility]     Please page the following number for clinical matters between the hours of 9AM and 5PM   from Monday through Friday : [phone]    After 5PM and on weekends, please page: [phone]. The Geriatric and Palliative Medicine consult service has 24/7 coverage for medical recommendations, including for symptom management needs.

## 2020-03-02 NOTE — PROGRESS NOTE ADULT - PROBLEM SELECTOR PLAN 1
- states pain better after surgery, would keep dilaudid for severe uncontrolled pain, and use oxy for moderate pain

## 2020-03-02 NOTE — PROGRESS NOTE ADULT - ATTENDING COMMENTS
Pt seen and examined. Doing well. Agree with above, with following changes/recommendations:    1) D/C HV  2) Change to WBAT w/ walker. No need for knee immobilizer  3) Transfuse an additional PRBC (d/t her underlying myeloma)  4) Full length AP/Lat femur xrays

## 2020-03-02 NOTE — PROGRESS NOTE ADULT - SUBJECTIVE AND OBJECTIVE BOX
ORTHO  Patient is a 79y old  Female who presents with a chief complaint of Right pathologic distal femur fracture (01 Mar 2020 07:22)    Pt. resting without complaint    VS-  T(C): 36.9 (03-02-20 @ 04:59), Max: 37.3 (03-01-20 @ 09:16)  HR: 77 (03-02-20 @ 04:59) (77 - 88)  BP: 124/72 (03-02-20 @ 04:59) (114/70 - 147/81)  RR: 18 (03-02-20 @ 04:59) (18 - 18)  SpO2: 95% (03-02-20 @ 04:59) (94% - 98%)  Wt(kg): --    M.S. A&O  Extremity- Right LE- immobilizer in place, dressing C/D/I, Hemovac- self suction- 10 cc/ shift  Neuro-              Motor- (+) ankle- DF/PF              Sensation- grossly intact to light touch              Calves- soft, nontender- PAS                               7.2    3.18  )-----------( 88       ( 01 Mar 2020 06:11 )             22.9     03-01    141  |  106  |  9   ----------------------------<  95  3.5   |  25  |  0.50    Ca    8.1<L>      01 Mar 2020 06:11

## 2020-03-02 NOTE — DISCHARGE NOTE PROVIDER - NSDCACTIVITY_GEN_ALL_CORE
Do not drive or operate machinery/Stairs allowed/Do not make important decisions/No heavy lifting/straining/Walking - Outdoors allowed/Walking - Indoors allowed

## 2020-03-02 NOTE — DISCHARGE NOTE PROVIDER - HOSPITAL COURSE
Patient transferred to [facility] s/p findings of right pathological femur fracture s/p twisting her leg    to get into wheelchair. Seen by Medicine team and cleared for surgery. Underwent an open reduction internal fixation of right distal femur fracture without    complications. On 3/1/20 received 1 unit of packed red blood cells for acute anemia secondary to  operative blood loss. Evaluated and treated by physical therapy for nonweight    bearing on right lower extremity in knee immobilizer and recommended rehab for discharge disposition. History of Present Illness:     79y Female PMHx multiple myeloma s/p L femur ORIF for pathologic fracture (5 years ago, OS), R restoration modular jacques hip arthroplasty replacement (Dr. Ernandez) presents s/p twisting injury c/o severe R knee pain and inability to ambulate. Patient initially presented to [facility] where she was found to have a R distal femur fracture and was transferred to [facility] for surgical management. Patient denies headstrike or LOC. Patient denies radiation of pain. Patient denies numbness/tingling/burning in the RLE. No other bone/joint complaints. Patient is a community ambulator at baseline with/without assistive devices.        PAST MEDICAL HISTORY:    multiple myeloma    DVT        PAST SURGICAL HISTORY:    Left femur IM nail    right hip hemiarthroplasty        HOSPITAL COURSE:    Patient transferred to [facility] s/p findings of right pathological femur fracture s/p twisting her leg to get into wheelchair. Seen by Medicine team and cleared for surgery. Patient underwent an open reduction internal fixation of right distal femur fracture  on 2/28/2020 with Dr. Ernandez without complications. Patient received multiple units of packed red blood cells perioperatively for acute anemia secondary to operative blood loss, and her history of multiple myeloma. Evaluated and treated by physical therapy for nonweightbearing on right lower extremity in knee immobilizer and recommended rehab for discharge disposition.

## 2020-03-02 NOTE — DISCHARGE NOTE PROVIDER - NSDCMRMEDTOKEN_GEN_ALL_CORE_FT
acyclovir 400 mg oral tablet: 1 tab(s) orally 2 times a day  bisacodyl 5 mg oral delayed release tablet: 1 tab(s) orally every 12 hours, As needed, Constipation  Eliquis 5 mg oral tablet: 1 tab(s) orally 2 times a day  fentaNYL 100 mcg/hr transdermal film, extended release: 1 film(s) transdermal every 72 hours  fentaNYL 50 mcg/hr transdermal film, extended release: 1 film(s) transdermal every 72 hours  Lexapro 20 mg oral tablet: 1 tab(s) orally once a day  Multiple Vitamins oral tablet: 1 tab(s) orally once a day  oxyCODONE 20 mg oral tablet: 1 tab(s) orally every 4 hours, As Needed  polyethylene glycol 3350 oral powder for reconstitution: 17 gram(s) orally once a day acyclovir 400 mg oral tablet: 1 tab(s) orally 2 times a day  Eliquis 5 mg oral tablet: 1 tab(s) orally 2 times a day  fentaNYL 100 mcg/hr transdermal film, extended release: 1 film(s) transdermal every 72 hours  fentaNYL 50 mcg/hr transdermal film, extended release: 1 film(s) transdermal every 72 hours  Lexapro 20 mg oral tablet: 1 tab(s) orally once a day  Multiple Vitamins oral tablet: 1 tab(s) orally once a day  oxyCODONE 20 mg oral tablet: 1 tab(s) orally every 4 hours, As Needed  pantoprazole 40 mg oral delayed release tablet: 1 tab(s) orally once a day (before a meal)  polyethylene glycol 3350 oral powder for reconstitution: 17 gram(s) orally once a day  senna oral tablet: 2 tab(s) orally once a day (at bedtime)

## 2020-03-02 NOTE — DISCHARGE NOTE PROVIDER - NSDCFUADDAPPT_GEN_ALL_CORE_FT
It is highly recommended you follow up with your primary care physician in 4 weeks to discuss recent surgery/general checkup/possible medication adjustment.

## 2020-03-02 NOTE — PROGRESS NOTE ADULT - ASSESSMENT
79F with PMH of MM (s/p L femoral ORIF for path fracture 2015) here with twisting injury, severe R knee pain, and inability to ambulate. Was found to have a R distal femur fracture and was transferred to [facility] for surgical management. Palliative care called to help with pain management. Patient is scheduled for ORIF.

## 2020-03-02 NOTE — DISCHARGE NOTE PROVIDER - CARE PROVIDER_API CALL
Eliseo Ernandez)  Orthopedics  [address], [address]  [place] [address]  Phone: [phone]  Fax: [phone]  Follow Up Time:

## 2020-03-02 NOTE — PROGRESS NOTE ADULT - ASSESSMENT
Impression: Stable       Plan:  Continue present treatment                 Out of bed, ambulate, non-weight bearing in immobilizer                  Physical therapy follow up                  Continue to monitor    Johnathon Gusman PA-C  Orthopaedic Surgery  Team pager [phone]  [phone]

## 2020-03-03 ENCOUNTER — TRANSCRIPTION ENCOUNTER (OUTPATIENT)
Age: 80
End: 2020-03-03

## 2020-03-03 VITALS — WEIGHT: 142.64 LBS

## 2020-03-03 LAB
ANION GAP SERPL CALC-SCNC: 9 MMOL/L — SIGNIFICANT CHANGE UP (ref 5–17)
BUN SERPL-MCNC: 12 MG/DL — SIGNIFICANT CHANGE UP (ref 7–23)
CALCIUM SERPL-MCNC: 8.8 MG/DL — SIGNIFICANT CHANGE UP (ref 8.4–10.5)
CHLORIDE SERPL-SCNC: 102 MMOL/L — SIGNIFICANT CHANGE UP (ref 96–108)
CO2 SERPL-SCNC: 27 MMOL/L — SIGNIFICANT CHANGE UP (ref 22–31)
CREAT SERPL-MCNC: 0.47 MG/DL — LOW (ref 0.5–1.3)
GLUCOSE SERPL-MCNC: 95 MG/DL — SIGNIFICANT CHANGE UP (ref 70–99)
HCT VFR BLD CALC: 32.9 % — LOW (ref 34.5–45)
HGB BLD-MCNC: 10.4 G/DL — LOW (ref 11.5–15.5)
MCHC RBC-ENTMCNC: 28.8 PG — SIGNIFICANT CHANGE UP (ref 27–34)
MCHC RBC-ENTMCNC: 31.6 GM/DL — LOW (ref 32–36)
MCV RBC AUTO: 91.1 FL — SIGNIFICANT CHANGE UP (ref 80–100)
NRBC # BLD: 0 /100 WBCS — SIGNIFICANT CHANGE UP (ref 0–0)
PLATELET # BLD AUTO: 84 K/UL — LOW (ref 150–400)
POTASSIUM SERPL-MCNC: 4 MMOL/L — SIGNIFICANT CHANGE UP (ref 3.5–5.3)
POTASSIUM SERPL-SCNC: 4 MMOL/L — SIGNIFICANT CHANGE UP (ref 3.5–5.3)
RBC # BLD: 3.61 M/UL — LOW (ref 3.8–5.2)
RBC # FLD: 17 % — HIGH (ref 10.3–14.5)
SODIUM SERPL-SCNC: 138 MMOL/L — SIGNIFICANT CHANGE UP (ref 135–145)
WBC # BLD: 2.47 K/UL — LOW (ref 3.8–10.5)
WBC # FLD AUTO: 2.47 K/UL — LOW (ref 3.8–10.5)

## 2020-03-03 PROCEDURE — 73522 X-RAY EXAM HIPS BI 3-4 VIEWS: CPT

## 2020-03-03 PROCEDURE — 82306 VITAMIN D 25 HYDROXY: CPT

## 2020-03-03 PROCEDURE — 86923 COMPATIBILITY TEST ELECTRIC: CPT

## 2020-03-03 PROCEDURE — 73700 CT LOWER EXTREMITY W/O DYE: CPT

## 2020-03-03 PROCEDURE — P9040: CPT

## 2020-03-03 PROCEDURE — C1713: CPT

## 2020-03-03 PROCEDURE — 80053 COMPREHEN METABOLIC PANEL: CPT

## 2020-03-03 PROCEDURE — P9016: CPT

## 2020-03-03 PROCEDURE — 76000 FLUOROSCOPY <1 HR PHYS/QHP: CPT

## 2020-03-03 PROCEDURE — 88364 INSITU HYBRIDIZATION (FISH): CPT

## 2020-03-03 PROCEDURE — 88341 IMHCHEM/IMCYTCHM EA ADD ANTB: CPT

## 2020-03-03 PROCEDURE — 97530 THERAPEUTIC ACTIVITIES: CPT

## 2020-03-03 PROCEDURE — 73562 X-RAY EXAM OF KNEE 3: CPT

## 2020-03-03 PROCEDURE — 71045 X-RAY EXAM CHEST 1 VIEW: CPT

## 2020-03-03 PROCEDURE — C1889: CPT

## 2020-03-03 PROCEDURE — 88342 IMHCHEM/IMCYTCHM 1ST ANTB: CPT

## 2020-03-03 PROCEDURE — 86022 PLATELET ANTIBODIES: CPT

## 2020-03-03 PROCEDURE — 97166 OT EVAL MOD COMPLEX 45 MIN: CPT

## 2020-03-03 PROCEDURE — 36430 TRANSFUSION BLD/BLD COMPNT: CPT

## 2020-03-03 PROCEDURE — 73552 X-RAY EXAM OF FEMUR 2/>: CPT

## 2020-03-03 PROCEDURE — 85027 COMPLETE CBC AUTOMATED: CPT

## 2020-03-03 PROCEDURE — 85610 PROTHROMBIN TIME: CPT

## 2020-03-03 PROCEDURE — P9037: CPT

## 2020-03-03 PROCEDURE — 88305 TISSUE EXAM BY PATHOLOGIST: CPT

## 2020-03-03 PROCEDURE — 86850 RBC ANTIBODY SCREEN: CPT

## 2020-03-03 PROCEDURE — 97161 PT EVAL LOW COMPLEX 20 MIN: CPT

## 2020-03-03 PROCEDURE — 86900 BLOOD TYPING SEROLOGIC ABO: CPT

## 2020-03-03 PROCEDURE — 76377 3D RENDER W/INTRP POSTPROCES: CPT

## 2020-03-03 PROCEDURE — 86901 BLOOD TYPING SEROLOGIC RH(D): CPT

## 2020-03-03 PROCEDURE — 97116 GAIT TRAINING THERAPY: CPT

## 2020-03-03 PROCEDURE — 97110 THERAPEUTIC EXERCISES: CPT

## 2020-03-03 PROCEDURE — 80048 BASIC METABOLIC PNL TOTAL CA: CPT

## 2020-03-03 PROCEDURE — 85730 THROMBOPLASTIN TIME PARTIAL: CPT

## 2020-03-03 PROCEDURE — 93005 ELECTROCARDIOGRAM TRACING: CPT

## 2020-03-03 PROCEDURE — 99285 EMERGENCY DEPT VISIT HI MDM: CPT

## 2020-03-03 PROCEDURE — 88311 DECALCIFY TISSUE: CPT

## 2020-03-03 PROCEDURE — 88304 TISSUE EXAM BY PATHOLOGIST: CPT

## 2020-03-03 PROCEDURE — 88365 INSITU HYBRIDIZATION (FISH): CPT

## 2020-03-03 PROCEDURE — 88360 TUMOR IMMUNOHISTOCHEM/MANUAL: CPT

## 2020-03-03 RX ORDER — PANTOPRAZOLE SODIUM 20 MG/1
1 TABLET, DELAYED RELEASE ORAL
Qty: 0 | Refills: 0 | DISCHARGE
Start: 2020-03-03

## 2020-03-03 RX ORDER — SENNA PLUS 8.6 MG/1
2 TABLET ORAL
Qty: 0 | Refills: 0 | DISCHARGE
Start: 2020-03-03

## 2020-03-03 RX ADMIN — Medication 10 MILLIGRAM(S): at 11:41

## 2020-03-03 RX ADMIN — Medication 1 TABLET(S): at 05:09

## 2020-03-03 RX ADMIN — ESCITALOPRAM OXALATE 20 MILLIGRAM(S): 10 TABLET, FILM COATED ORAL at 08:00

## 2020-03-03 RX ADMIN — FENTANYL CITRATE 1 PATCH: 50 INJECTION INTRAVENOUS at 07:59

## 2020-03-03 RX ADMIN — APIXABAN 5 MILLIGRAM(S): 2.5 TABLET, FILM COATED ORAL at 10:17

## 2020-03-03 RX ADMIN — PANTOPRAZOLE SODIUM 40 MILLIGRAM(S): 20 TABLET, DELAYED RELEASE ORAL at 05:09

## 2020-03-03 RX ADMIN — Medication 400 MILLIGRAM(S): at 10:17

## 2020-03-03 NOTE — PROGRESS NOTE ADULT - REASON FOR ADMISSION
Right distal femur fracture
Right pathologic distal femur fracture
Right pathologic femur fracture
pathological fracture right distal femur
right pathologic distal femur fracture
pathologic fracture

## 2020-03-03 NOTE — DISCHARGE NOTE NURSING/CASE MANAGEMENT/SOCIAL WORK - PATIENT PORTAL LINK FT
You can access the FollowMyHealth Patient Portal offered by [facility] by registering at the following website: [email or web address]. By joining [facility]’s FollowMyHealth portal, you will also be able to view your health information using other applications (apps) compatible with our system.

## 2020-03-03 NOTE — PROGRESS NOTE ADULT - SUBJECTIVE AND OBJECTIVE BOX
Stable comfortable , out of bed ambulating with a walker. new xray stable fixation, Arrangement for discharge to rehab

## 2020-03-03 NOTE — PROGRESS NOTE ADULT - ASSESSMENT
Patient transferred to [facility] for orthopedics consultation. Patient found to have a distal right femur fx. The patient underwent plate and screw ORIF of the right distal femur on 02/28/20. She was seen by palliative care for pre-op pain control and will require continuation of pain control post operatively. She is presently post-op and hemodynamically stable. Patient states painb is well controlled. has been having issues with constipation from pain meds and immobility. Patient treated with relistor IV with good results.  Will continue physical therapy as tolerated. DVT and GI prophylaxis as needed,  follow H&H and transfuse as needed. DC planning to rehab as per Ortho.

## 2020-03-03 NOTE — PROGRESS NOTE ADULT - ASSESSMENT
78 y/o FM s/p ORIF right distal pathologic femur fracture POD#4, WBAT rehab when bed availble  Dalila Lynch PA-C  Orthopaedic Surgery  Team pager [phone]  [facility] [phone]  [phone] 80 y/o FM s/p ORIF right distal pathologic femur fracture POD#4, WBAT rehab when bed available. Patient will not require radiation or chemotherapy while at rehab facility.  Team pager [phone]  [facility] [phone]  [phone]

## 2020-03-03 NOTE — DIETITIAN INITIAL EVALUATION ADULT. - ADD RECOMMEND
1) Recommend continue current diet order of regular. 2) Encourage PO intake and protein intake at every meal for post-surgical healing. 3) Provided pt with education on foods high in protein, reinforce as necessary. 4) Honor/obtain pt preferences as able.

## 2020-03-03 NOTE — PROGRESS NOTE ADULT - SUBJECTIVE AND OBJECTIVE BOX
Patient is a 79y old  Female who presents with a chief complaint of pathologic fracture.  PMHx of MM on chemo p/w RLE pain.  Patient reports that she has had right knee pain for the past 5 days.  Today she twisted to get into her wheelchair and had severe right knee pain and felt a "crack." Patient was unable to ambulate. Called EMS and was taken to [facility] where she was diagnosed with a distal femur fracture.  Patient transferred to [facility] for orthopedics consultation. Patient found to have a distal right femur fx. The patient underwent plate and screw ORIF of the right distal femur on 02/28/20. She was seen by palliative care for pre-op pain control and will require continuation of pain control post operatively. She is presently post-op and hemodynamically stable. Patient complaining of constipation. Patient had a large BM last night. pain has improved. Patient found to be anemic and received a transfusion .        MEDICATIONS  (STANDING):  acetaminophen   Tablet .. 975 milliGRAM(s) Oral every 8 hours  acyclovir   Oral Tab/Cap 400 milliGRAM(s) Oral two times a day  apixaban 5 milliGRAM(s) Oral two times a day  escitalopram 20 milliGRAM(s) Oral daily  fentaNYL   Patch  75 MICROgram(s)/Hr 1 Patch Transdermal every 72 hours  fentaNYL   Patch  75 MICROgram(s)/Hr 1 Patch Transdermal every 72 hours  lactated ringers. 1000 milliLiter(s) (75 mL/Hr) IV Continuous <Continuous>  melatonin 5 milliGRAM(s) Oral at bedtime  pantoprazole    Tablet 40 milliGRAM(s) Oral before breakfast  polyethylene glycol 3350 17 Gram(s) Oral daily  senna 2 Tablet(s) Oral at bedtime    MEDICATIONS  (PRN):  calcium carbonate    500 mG (Tums) Chewable 1 Tablet(s) Chew every 6 hours PRN Heartburn  HYDROmorphone  Injectable 0.5 milliGRAM(s) IV Push every 6 hours PRN severe breakthrough pain  metoclopramide Injectable 10 milliGRAM(s) IV Push every 6 hours PRN nausa or vomiting  oxyCODONE    IR 10 milliGRAM(s) Oral every 4 hours PRN Moderate Pain (4 - 6)  oxyCODONE    IR 5 milliGRAM(s) Oral every 4 hours PRN Mild Pain (1 - 3)  oxyCODONE    IR 30 milliGRAM(s) Oral every 4 hours PRN Severe Pain (7 - 10)          VITALS:   T(C): 36.6 (03-02-20 @ 18:00), Max: 37.3 (03-01-20 @ 22:19)  HR: 65 (03-02-20 @ 18:00) (65 - 88)  BP: 150/85 (03-02-20 @ 18:00) (112/69 - 150/85)  RR: 18 (03-02-20 @ 18:00) (18 - 18)  SpO2: 95% (03-02-20 @ 18:00) (95% - 98%)  Wt(kg): --    PHYSICAL EXAM:  GENERAL: NAD, well nourished and conversant  HEAD:  Atraumatic  EYES: EOM, PERRLA, conjunctiva pink and sclera white  ENT: No tonsillar erythema, exudates, or enlargement, moist mucous membranes, good dentition, no lesions  NECK: Supple, No JVD, normal thyroid, carotids with normal upstrokes and no bruits  CHEST/LUNG: Clear to auscultation bilaterally, No rales, rhonchi, wheezing, or rubs  HEART: Regular rate and rhythm, No murmurs, rubs, or gallops  ABDOMEN: Soft, nondistended, no masses, guarding, tenderness or rebound, bowel sounds present  EXTREMITIES:  2+ Peripheral Pulses, No clubbing, cyanosis, or edema.   LYMPH: No lymphadenopathy noted  SKIN: No rashes or lesions  NERVOUS SYSTEM:  Alert & Oriented X3, normal cognitive function. Motor Strength 5/5 right upper and right lower.  5/5 left upper and left lower extremities, DTRs 2+ intact and symmetric    LABS:        CBC Full  -  ( 02 Mar 2020 06:03 )  WBC Count : 3.11 K/uL  RBC Count : 3.12 M/uL  Hemoglobin : 9.1 g/dL  Hematocrit : 28.7 %  Platelet Count - Automated : 91 K/uL  Mean Cell Volume : 92.0 fl  Mean Cell Hemoglobin : 29.2 pg  Mean Cell Hemoglobin Concentration : 31.7 gm/dL  Auto Neutrophil # : x  Auto Lymphocyte # : x  Auto Monocyte # : x  Auto Eosinophil # : x  Auto Basophil # : x  Auto Neutrophil % : x  Auto Lymphocyte % : x  Auto Monocyte % : x  Auto Eosinophil % : x  Auto Basophil % : x    03-02    138  |  103  |  9   ----------------------------<  100<H>  4.0   |  26  |  0.43<L>    Ca    8.3<L>      02 Mar 2020 06:03            CAPILLARY BLOOD GLUCOSE          RADIOLOGY & ADDITIONAL TESTS: Patient is a 79y old  Female who presents with a chief complaint of pathologic fracture.  PMHx of MM on chemo p/w RLE pain.  Patient reports that she has had right knee pain for the past 5 days.  Today she twisted to get into her wheelchair and had severe right knee pain and felt a "crack." Patient was unable to ambulate. Called EMS and was taken to [facility] where she was diagnosed with a distal femur fracture.  Patient transferred to [facility] for orthopedics consultation. Patient found to have a distal right femur fx. The patient underwent plate and screw ORIF of the right distal femur on 02/28/20. She was seen by palliative care for pre-op pain control and will require continuation of pain control post operatively. She is presently post-op and hemodynamically stable. Patient complaining of constipation. Patient had a large BM last night.  Pain has improved. Patient found to be anemic and received a transfusion post-op.  Hemodynamically  stable with no medical complications post-op to date.        MEDICATIONS  (STANDING):  acetaminophen   Tablet .. 975 milliGRAM(s) Oral every 8 hours  acyclovir   Oral Tab/Cap 400 milliGRAM(s) Oral two times a day  apixaban 5 milliGRAM(s) Oral two times a day  escitalopram 20 milliGRAM(s) Oral daily  fentaNYL   Patch  75 MICROgram(s)/Hr 1 Patch Transdermal every 72 hours  fentaNYL   Patch  75 MICROgram(s)/Hr 1 Patch Transdermal every 72 hours  lactated ringers. 1000 milliLiter(s) (75 mL/Hr) IV Continuous <Continuous>  melatonin 5 milliGRAM(s) Oral at bedtime  pantoprazole    Tablet 40 milliGRAM(s) Oral before breakfast  polyethylene glycol 3350 17 Gram(s) Oral daily  senna 2 Tablet(s) Oral at bedtime    MEDICATIONS  (PRN):  calcium carbonate    500 mG (Tums) Chewable 1 Tablet(s) Chew every 6 hours PRN Heartburn  HYDROmorphone  Injectable 0.5 milliGRAM(s) IV Push every 6 hours PRN severe breakthrough pain  metoclopramide Injectable 10 milliGRAM(s) IV Push every 6 hours PRN nausa or vomiting  oxyCODONE    IR 10 milliGRAM(s) Oral every 4 hours PRN Moderate Pain (4 - 6)  oxyCODONE    IR 5 milliGRAM(s) Oral every 4 hours PRN Mild Pain (1 - 3)  oxyCODONE    IR 30 milliGRAM(s) Oral every 4 hours PRN Severe Pain (7 - 10)          Vital Signs Last 24 Hrs  T(C): 36.7 (03 Mar 2020 09:40), Max: 36.7 (03 Mar 2020 00:30)  T(F): 98.1 (03 Mar 2020 09:40), Max: 98.1 (03 Mar 2020 09:40)  HR: 86 (03 Mar 2020 09:40) (73 - 86)  BP: 143/80 (03 Mar 2020 09:40) (122/80 - 154/80)  BP(mean): --  RR: 18 (03 Mar 2020 09:40) (18 - 18)  SpO2: 94% (03 Mar 2020 09:40) (94% - 95%)    PHYSICAL EXAM:  GENERAL: NAD, well nourished and conversant  HEAD:  Atraumatic  EYES: EOM, PERRLA, conjunctiva pink and sclera white  ENT: No tonsillar erythema, exudates, or enlargement, moist mucous membranes, good dentition, no lesions  NECK: Supple, No JVD, normal thyroid, carotids with normal upstrokes and no bruits  CHEST/LUNG: Clear to auscultation bilaterally, No rales, rhonchi, wheezing, or rubs  HEART: Regular rate and rhythm, No murmurs, rubs, or gallops  ABDOMEN: Soft, nondistended, no masses, guarding, tenderness or rebound, bowel sounds present  EXTREMITIES:  2+ Peripheral Pulses, No clubbing, cyanosis, or edema.   LYMPH: No lymphadenopathy noted  SKIN: No rashes or lesions  NERVOUS SYSTEM:  Alert & Oriented X3, normal cognitive function. Motor Strength 5/5 right upper and right lower.  5/5 left upper and left lower extremities, DTRs 2+ intact and symmetric    LABS:          CBC Full  -  ( 03 Mar 2020 08:25 )  WBC Count : 2.47 K/uL  RBC Count : 3.61 M/uL  Hemoglobin : 10.4 g/dL  Hematocrit : 32.9 %  Platelet Count - Automated : 84 K/uL  Mean Cell Volume : 91.1 fl  Mean Cell Hemoglobin : 28.8 pg  Mean Cell Hemoglobin Concentration : 31.6 gm/dL  Auto Neutrophil # : x  Auto Lymphocyte # : x  Auto Monocyte # : x  Auto Eosinophil # : x  Auto Basophil # : x  Auto Neutrophil % : x  Auto Lymphocyte % : x  Auto Monocyte % : x  Auto Eosinophil % : x  Auto Basophil % : x    03-03    138  |  102  |  12  ----------------------------<  95  4.0   |  27  |  0.47<L>    Ca    8.8      03 Mar 2020 08:25

## 2020-03-03 NOTE — DIETITIAN INITIAL EVALUATION ADULT. - REASON INDICATOR FOR ASSESSMENT
Initial assessment warranted for length of stay on [facility].     Source: information obtained from comprehensive chart review, patient.

## 2020-03-03 NOTE — DIETITIAN INITIAL EVALUATION ADULT. - OTHER INFO
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 Admitted for R distal femur fracture. S/p ORIF right distal pathologic femur fracture POD#4.     Pt reports eating well PTA. Pt was eating 3 meals/day and follows a well balanced diet. Pt reports  prepares meals for her. Pt endorses taking multivitamin and vitamin D daily. NKFA. Pt denies chewing or swallowing difficulty. No N + V, diarrhea or constipation prior to admission.     Weights: dosing weight noted as 143 pounds (02-28). Pt reports no recent weight loss, stating UBW of 145 pounds.    Pt reports stable appetite in-house, consuming 75% of meals. Pt reports eating shrimp last night and scrambled eggs for breakfast with some tea. Pt endorses intermittent nausea with no vomiting, RN made aware. Last BM (03-02), noted pt on Miralax and Senna. Pt denies need for nutrition supplements at this time. Educated pt on importance of adequate protein with each meal for post-surgical healing, discussed sources of protein. Pt with no further related nutrition questions at this time, made aware RD to remain available.

## 2020-03-03 NOTE — PROGRESS NOTE ADULT - SUBJECTIVE AND OBJECTIVE BOX
Patient is a 79y old  Female who presents with a chief complaint of pathologic right distal femur fracture  Patient s/p ORIF right distal pathologic femur fracture POD#4  Patient resting without complaints.  No chest pain, SOB, N/V.    T(C): 36.7 (03-03-20 @ 04:28), Max: 36.9 (03-02-20 @ 09:00)  HR: 73 (03-03-20 @ 04:28) (65 - 83)  BP: 154/80 (03-03-20 @ 04:28) (112/69 - 154/80)  RR: 18 (03-03-20 @ 04:28) (18 - 18)  SpO2: 95% (03-03-20 @ 04:28) (95% - 98%)    Exam:  Alert and Oriented, No Acute Distress  Cards: +S1/S2, RRR  Pulm: CTAB  Lower Extremities: Right L/E surgical incision/dressing CDI  Calves Soft, Non-tender bilaterally  +PF/DF/EHL/FHL  SILT  +DP Pulse                        9.1    3.11  )-----------( 91       ( 02 Mar 2020 06:03 )             28.7    03-02  138  |  103  |  9   ----------------------------<  100<H>  4.0   |  26  |  0.43<L>  Ca    8.3<L>      02 Mar 2020 06:03

## 2020-03-03 NOTE — DIETITIAN INITIAL EVALUATION ADULT. - PHYSICAL APPEARANCE
well nourished/BMI: 26.9 (calculated using pt stated ht), Ht: 61 inches (stated by pt), Wt: 142.78 pounds, IBW: 110 pounds, 130% of IBW/other (specify) Skin: no pressure injuries noted per flowsheets  Edema: +3  R hip, R leg, L + R foot noted per flowsheets    Pt appears well nourished for age, no visible signs of muscle depletion or fat loss.

## 2020-03-12 LAB — SURGICAL PATHOLOGY STUDY: SIGNIFICANT CHANGE UP

## 2020-09-22 NOTE — DIETITIAN INITIAL EVALUATION ADULT. - FEEDING SKILL
Orthopedic End of Shift Note Bedside and Verbal shift change report given to Lobo BURNETT (oncoming nurse) by Deepthi Limon (offgoing nurse). Report included the following information SBAR, Kardex, MAR and Recent Results. POD# Significant issues during shift: none Issues for Physician to address: none Activity This Shift 
(check all that apply) [x] chair 
[] dangle 
 [] bathroom 
[] bedside commode [] hallway 
[] bedrest  
Nausea/Vomiting [] yes [x] no    
Voiding Status [x] void [] [place] [] I&O Cath Bowel Movements [] yes [x] no Foot Pumps or SCD [x] yes [] no Ice Pack [] yes    [x] no Incentive Spirometer [] yes [x] no Volume:   0 Telemetry Monitoring   [x] yes [] no Rhythm: NSR Supplemental O2 [x] yes [] no Sat off O2:   94% independent

## 2021-05-28 NOTE — PROVIDER CONTACT NOTE (OTHER) - ASSESSMENT
PT TOOK PRE MEDS OF DEXAMETHASONE AND FLUCONAZOLE AND BECAME NAUSEOUS
Pt presents with rigors, VSS, afebrile.
stopped 2008

## 2024-08-22 NOTE — PATIENT PROFILE ADULT - NSPROGENBLOODRESTRICT_GEN_A_NUR
[As Noted in HPI] : as noted in HPI [Arthralgias] : arthralgias [Joint Stiffness] : joint stiffness [Negative] : Heme/Lymph none